# Patient Record
Sex: FEMALE | Race: WHITE | NOT HISPANIC OR LATINO | Employment: OTHER | URBAN - METROPOLITAN AREA
[De-identification: names, ages, dates, MRNs, and addresses within clinical notes are randomized per-mention and may not be internally consistent; named-entity substitution may affect disease eponyms.]

---

## 2017-01-03 RX ORDER — HYDROCHLOROTHIAZIDE 25 MG/1
TABLET ORAL
Qty: 90 TABLET | Refills: 1 | Status: SHIPPED | OUTPATIENT
Start: 2017-01-03 | End: 2017-06-12 | Stop reason: SDUPTHER

## 2017-02-23 ENCOUNTER — PATIENT MESSAGE (OUTPATIENT)
Dept: INTERNAL MEDICINE | Facility: CLINIC | Age: 72
End: 2017-02-23

## 2017-02-23 DIAGNOSIS — Z86.69 HISTORY OF MIGRAINE HEADACHES: ICD-10-CM

## 2017-02-23 RX ORDER — PROPRANOLOL HYDROCHLORIDE 120 MG/1
CAPSULE, EXTENDED RELEASE ORAL
Qty: 90 CAPSULE | Refills: 1 | Status: SHIPPED | OUTPATIENT
Start: 2017-02-23 | End: 2017-06-12 | Stop reason: SDUPTHER

## 2017-02-24 ENCOUNTER — PATIENT MESSAGE (OUTPATIENT)
Dept: INTERNAL MEDICINE | Facility: CLINIC | Age: 72
End: 2017-02-24

## 2017-03-27 ENCOUNTER — OFFICE VISIT (OUTPATIENT)
Dept: OTOLARYNGOLOGY | Facility: CLINIC | Age: 72
End: 2017-03-27
Payer: MEDICARE

## 2017-03-27 VITALS
BODY MASS INDEX: 32.56 KG/M2 | WEIGHT: 207.88 LBS | SYSTOLIC BLOOD PRESSURE: 143 MMHG | HEART RATE: 71 BPM | DIASTOLIC BLOOD PRESSURE: 84 MMHG

## 2017-03-27 DIAGNOSIS — H61.23 BILATERAL IMPACTED CERUMEN: Primary | ICD-10-CM

## 2017-03-27 PROCEDURE — 99499 UNLISTED E&M SERVICE: CPT | Mod: 25,S$PBB,, | Performed by: PHYSICIAN ASSISTANT

## 2017-03-27 PROCEDURE — 69210 REMOVE IMPACTED EAR WAX UNI: CPT | Mod: S$PBB,,, | Performed by: PHYSICIAN ASSISTANT

## 2017-03-27 PROCEDURE — 99213 OFFICE O/P EST LOW 20 MIN: CPT | Mod: PBBFAC | Performed by: PHYSICIAN ASSISTANT

## 2017-03-27 PROCEDURE — 69210 REMOVE IMPACTED EAR WAX UNI: CPT | Mod: PBBFAC | Performed by: PHYSICIAN ASSISTANT

## 2017-03-27 PROCEDURE — 99999 PR PBB SHADOW E&M-EST. PATIENT-LVL III: CPT | Mod: PBBFAC,,, | Performed by: PHYSICIAN ASSISTANT

## 2017-03-27 NOTE — PROGRESS NOTES
Subjective:   Patient: Teresa Orozco [id number], :1945   Visit date:3/27/2017 3:36 PM    Chief Complaint:  Ear Fullness    HPI:     Teresa Orozco is a 71 y.o. female whom I am asked to see for evaluation of diminished hearing in both ears for the past 2 months. There is a prior history of cerumen impaction.  She was last here in 2016 with Dr. Herbert for cerumen impactions.  Denies ear pain or drainage.    Her meds, allergies, medical, surgical, social & family histories were reviewed & updated:  -     She has a current medication list which includes the following prescription(s): cholecalciferol (vitamin d3), esomeprazole, flaxseed oil, hydrochlorothiazide, multivitamin, propranolol, propylene glycol/peg 400/pf, and samson's wort.  -     She  has a past medical history of Anxiety; Breast cancer (); Fluid retention; Headache; Hyperlipidemia; IBS (irritable bowel syndrome); Lymphocytic colitis (); and Vertigo.   -     She  does not have any pertinent problems on file.   -     She  has a past surgical history that includes Oophorectomy (Left); Hysterectomy; Tubal ligation; Colon surgery; Colonoscopy w/ biopsies and polypectomy (); and Cholecystectomy ().  -     She  reports that she has never smoked. She does not have any smokeless tobacco history on file. She reports that she does not drink alcohol or use illicit drugs.  -     Her family history includes Cancer in her mother.  -     She is allergic to demerol [meperidine].      Review of Systems:  -     Allergic/Immunologic: is allergic to demerol [meperidine]..  -     Constitutional: Denies fever.          Objective:     Physical Exam:  Vitals:  BP (!) 143/84  Pulse 71  Wt 94.3 kg (207 lb 14.3 oz)  BMI 32.56 kg/m2  Communication:  Able to communicate, no hoarseness.  Head & Face:  Normocephalic, atraumatic, no sinus tenderness.  Eyes:  Extraocular motions intact.  Ears:  Otoscopy of external auditory canals reveals impaction of bilateral ear  canals.  With the patient in the supine position, we used the operating microscope to examine both ears with the appropriate sized ear speculum.  A variety of sterile, micro-instruments were utilized to remove the cerumen atraumatically from the impacted ear(s).   After removal, the ears were reexamined-  Right Ear:  No mass/lesion of auricle. The external auditory canals is without erythema or discharge. Otoscopy of the tympanic membrane revealed no perforation and no middle ear fluid.  Clinical speech reception thresholds grossly normal.  Left Ear:  No mass/lesion of auricle. The external auditory canals has mild erythema but no discharge.  Otoscopy of the tympanic membrane revealed no perforation and no middle ear effusion. Clinical speech reception thresholds grossly normal  Nose:  No masses/lesions of external nose, nasal mucosa, septum, and turbinates were within normal limits.  Mouth:  No mass/lesion of lips, teeth, gums, hard/soft palate, tongue, tonsils, or oropharynx.  Neck & Lymphatics:  No cervical lymphadenopathy, no neck mass/crepitus/ asymmetry, trachea is midline, no thyroid enlargement/tenderness/mass.  Neuro/Psych: Alert with normal mood and affect.   Respiration/Chest:  Symmetric expansion during respiration, normal respiratory effort.  Skin:  Warm and intact.    Assessment & Plan:       -     Cerumen Impaction - Teresa has cerumen in both ear(s).  We discussed preventative measures and treatment options.  Q-tips must be avoided, instead the ears can be cleaned with OTC ear rinses (or a mixture of alcohol & vinegar in equal parts).   For hard wax, Teresa may place mineral oil/baby oil in the ear with a cotton ball at night and remove in the shower.  This will assist in softening the wax and allow it to drain out on its own. If the cerumen impacts the ear canal and causes hearing loss or infection she needs to follow-up in the clinic for treatment and cleaning.

## 2017-03-27 NOTE — MR AVS SNAPSHOT
[facility]  [id number] [place] [address]  [place] [address]  Phone: [phone]  Fax: [phone]                  Teresa Orozco   3/27/2017 2:40 PM   Office Visit    Description:  Female : 1945   Provider:  Genet Moody PA-C   Department:  [facility]           Reason for Visit     Ear Fullness                To Do List           Goals (5 Years of Data)     None      [facility] On Call     [facility] On Call Nurse Care Line -  Assistance  Registered nurses in the [facility] On Call Center provide clinical advisement, health education, appointment booking, and other advisory services.  Call for this free service at [phone].             Medications                Verify that the below list of medications is an accurate representation of the medications you are currently taking.  If none reported, the list may be blank. If incorrect, please contact your healthcare provider. Carry this list with you in case of emergency.           Current Medications     cholecalciferol, vitamin D3, (VITAMIN D3) 5,000 unit Tab Take 5,000 Units by mouth 3 (three) times a week.    esomeprazole (NEXIUM) 40 MG capsule Take 40 mg by mouth once daily.    FLAXSEED OIL ORAL Take 1,200 mg by mouth once daily.    hydrochlorothiazide (HYDRODIURIL) 25 MG tablet TAKE 1 TABLET (25 MG TOTAL) BY MOUTH ONCE DAILY. NEEDS TO ADD ORANGE JUICE OR BANANA TO DIET DAILY    multivitamin capsule Take 1 capsule by mouth once daily.    propranolol (INDERAL LA) 120 MG 24 hr capsule TAKE 1 CAPSULE DAILY    PROPYLENE GLYCOL//PF (SYSTANE ULTRA, PF, OPHT) Apply to eye.    Guerline's wort 300 mg Cap Take 300 mg by mouth 2 (two) times daily.           Clinical Reference Information           Your Vitals Were     BP Pulse Weight BMI       143/84 71 94.3 kg (207 lb 14.3 oz) 32.56 kg/m2       Blood Pressure          Most Recent Value    BP  (!)  143/84      Allergies as of 3/27/2017     Demerol  [Meperidine]      Immunizations Administered on Date of Encounter - 3/27/2017     None      Language Assistance Services     ATTENTION: Language assistance services are available, free of charge. Please call [phone].      ATENCIÓN: Si salma mares, tiene a callwaay disposición servicios gratuitos de asistencia lingüística. Llame al [phone].     [facility] Ý: N?u b?n nói Ti?ng Vi?t, có các d?ch v? h? tr? ngôn ng? mi?n phí dành cho b?n. G?i s? [phone].         [facility] complies with applicable Federal civil rights laws and does not discriminate on the basis of race, color, national origin, age, disability, or sex.

## 2017-03-31 DIAGNOSIS — Z86.69 HISTORY OF MIGRAINE HEADACHES: ICD-10-CM

## 2017-03-31 RX ORDER — PROPRANOLOL HYDROCHLORIDE 120 MG/1
CAPSULE, EXTENDED RELEASE ORAL
Qty: 90 CAPSULE | Refills: 0 | OUTPATIENT
Start: 2017-03-31

## 2017-04-26 ENCOUNTER — OFFICE VISIT (OUTPATIENT)
Dept: INTERNAL MEDICINE | Facility: CLINIC | Age: 72
End: 2017-04-26
Payer: MEDICARE

## 2017-04-26 ENCOUNTER — APPOINTMENT (OUTPATIENT)
Dept: RADIOLOGY | Facility: HOSPITAL | Age: 72
End: 2017-04-26
Attending: FAMILY MEDICINE
Payer: MEDICARE

## 2017-04-26 VITALS
WEIGHT: 207.69 LBS | DIASTOLIC BLOOD PRESSURE: 85 MMHG | SYSTOLIC BLOOD PRESSURE: 134 MMHG | HEART RATE: 67 BPM | OXYGEN SATURATION: 96 % | BODY MASS INDEX: 32.53 KG/M2 | TEMPERATURE: 98 F

## 2017-04-26 DIAGNOSIS — G89.29 CHRONIC RIGHT SHOULDER PAIN: ICD-10-CM

## 2017-04-26 DIAGNOSIS — M25.511 CHRONIC RIGHT SHOULDER PAIN: Primary | ICD-10-CM

## 2017-04-26 DIAGNOSIS — G89.29 CHRONIC RIGHT SHOULDER PAIN: Primary | ICD-10-CM

## 2017-04-26 DIAGNOSIS — M25.511 CHRONIC RIGHT SHOULDER PAIN: ICD-10-CM

## 2017-04-26 DIAGNOSIS — M19.011 PRIMARY OSTEOARTHRITIS OF RIGHT SHOULDER: ICD-10-CM

## 2017-04-26 PROCEDURE — 73030 X-RAY EXAM OF SHOULDER: CPT | Mod: 26,RT,, | Performed by: RADIOLOGY

## 2017-04-26 PROCEDURE — 73030 X-RAY EXAM OF SHOULDER: CPT | Mod: TC,PO,RT

## 2017-04-26 PROCEDURE — 99213 OFFICE O/P EST LOW 20 MIN: CPT | Mod: S$PBB,,, | Performed by: FAMILY MEDICINE

## 2017-04-26 PROCEDURE — 99999 PR PBB SHADOW E&M-EST. PATIENT-LVL III: CPT | Mod: PBBFAC,,, | Performed by: FAMILY MEDICINE

## 2017-04-26 RX ORDER — MELOXICAM 15 MG/1
15 TABLET ORAL DAILY
Qty: 30 TABLET | Refills: 0 | Status: SHIPPED | OUTPATIENT
Start: 2017-04-26 | End: 2017-04-26 | Stop reason: SDUPTHER

## 2017-04-26 RX ORDER — MELOXICAM 15 MG/1
15 TABLET ORAL DAILY
Qty: 30 TABLET | Refills: 0 | Status: SHIPPED | OUTPATIENT
Start: 2017-04-26 | End: 2017-06-12

## 2017-04-26 NOTE — PROGRESS NOTES
Subjective:       Patient ID: Teresa Orozco is a 71 y.o. female.    Chief Complaint: shoulder and arm pain (right side)    HPI Comments: 4- 6 month duration of right shoulder pain.  Pain is worse when arm is hanging  down and better when it is supported.  She mentioned she was carrying  heavy about the time that pain developed.  She was carrying a heavy load with her arm fully extended downward.  She denies any particular trauma beyond this.  She occasionally has neck pain which is stable.  She denies any weakness or numbness of the right arm.    Review of Systems   Constitutional: Positive for chills and fever.   Respiratory: Negative for cough and shortness of breath.    Cardiovascular: Negative for chest pain and palpitations.   Musculoskeletal: Positive for arthralgias.   Neurological: Negative for weakness and numbness.       Objective:      Physical Exam   Constitutional: She appears well-developed and well-nourished. No distress.   Musculoskeletal:   She has no tenderness of the neck.  She has no trapezius and scapular muscle tenderness.  She has localized tenderness with crepitance of the right  Ac  joint.  She has full range of motion of the right shoulder.       Lab Visit on 02/03/2016   Component Date Value Ref Range Status    Cholesterol 02/03/2016 268* 120 - 199 mg/dL Final    Triglycerides 02/03/2016 274* 30 - 150 mg/dL Final    HDL 02/03/2016 41  40 - 75 mg/dL Final    LDL Cholesterol 02/03/2016 172.2* 63.0 - 159.0 mg/dL Final    HDL/Chol Ratio 02/03/2016 15.3* 20.0 - 50.0 % Final    Total Cholesterol/HDL Ratio 02/03/2016 6.5* 2.0 - 5.0 Final    Non-HDL Cholesterol 02/03/2016 227  mg/dL Final    Sodium 02/03/2016 141  136 - 145 mmol/L Final    Potassium 02/03/2016 4.1  3.5 - 5.1 mmol/L Final    Chloride 02/03/2016 103  95 - 110 mmol/L Final    CO2 02/03/2016 28  23 - 29 mmol/L Final    Glucose 02/03/2016 93  70 - 110 mg/dL Final    BUN, Bld 02/03/2016 13  8 - 23 mg/dL Final    Creatinine  02/03/2016 0.7  0.5 - 1.4 mg/dL Final    Calcium 02/03/2016 9.7  8.7 - 10.5 mg/dL Final    Total Protein 02/03/2016 6.7  6.0 - 8.4 g/dL Final    Albumin 02/03/2016 3.7  3.5 - 5.2 g/dL Final    Total Bilirubin 02/03/2016 0.5  0.1 - 1.0 mg/dL Final    Alkaline Phosphatase 02/03/2016 99  55 - 135 U/L Final    AST 02/03/2016 16  10 - 40 U/L Final    ALT 02/03/2016 16  10 - 44 U/L Final    Anion Gap 02/03/2016 10  8 - 16 mmol/L Final    eGFR if African American 02/03/2016 >60.0  >60 mL/min/1.73 m^2 Final    eGFR if non African American 02/03/2016 >60.0  >60 mL/min/1.73 m^2 Final    WBC 02/03/2016 7.87  3.90 - 12.70 K/uL Final    RBC 02/03/2016 4.59  4.00 - 5.40 M/uL Final    Hemoglobin 02/03/2016 13.4  12.0 - 16.0 g/dL Final    Hematocrit 02/03/2016 41.5  37.0 - 48.5 % Final    MCV 02/03/2016 90  82 - 98 fL Final    MCH 02/03/2016 29.2  27.0 - 31.0 pg Final    MCHC 02/03/2016 32.3  32.0 - 36.0 % Final    RDW 02/03/2016 13.7  11.5 - 14.5 % Final    Platelets 02/03/2016 343  150 - 350 K/uL Final    MPV 02/03/2016 11.3  9.2 - 12.9 fL Final    Gran # 02/03/2016 4.5  1.8 - 7.7 K/uL Final    Lymph # 02/03/2016 2.5  1.0 - 4.8 K/uL Final    Mono # 02/03/2016 0.6  0.3 - 1.0 K/uL Final    Eos # 02/03/2016 0.2  0.0 - 0.5 K/uL Final    Baso # 02/03/2016 0.03  0.00 - 0.20 K/uL Final    Gran% 02/03/2016 57.3  38.0 - 73.0 % Final    Lymph% 02/03/2016 31.1  18.0 - 48.0 % Final    Mono% 02/03/2016 8.1  4.0 - 15.0 % Final    Eosinophil% 02/03/2016 2.7  0.0 - 8.0 % Final    Basophil% 02/03/2016 0.4  0.0 - 1.9 % Final    Differential Method 02/03/2016 Automated   Final     Assessment:       1. Chronic right shoulder pain        Plan:     Xray shoulder reviewed  Heat and rom bid with arm in sling  mobic 15 mg qd and ov 2wk    Chronic right shoulder pain  -     X-ray Shoulder 2 or More Views Right; Future; Expected date: 4/26/17

## 2017-04-27 ENCOUNTER — TELEPHONE (OUTPATIENT)
Dept: INTERNAL MEDICINE | Facility: CLINIC | Age: 72
End: 2017-04-27

## 2017-04-27 NOTE — TELEPHONE ENCOUNTER
Informed pt that meds sent to mail pharmacy has been canceled, if she receives them, she needs to call them to have them shipped back since she is picking them up from local pharmacy instead. Verbalized understanding.

## 2017-05-11 ENCOUNTER — OFFICE VISIT (OUTPATIENT)
Dept: INTERNAL MEDICINE | Facility: CLINIC | Age: 72
End: 2017-05-11
Payer: MEDICARE

## 2017-05-11 VITALS
WEIGHT: 208.25 LBS | SYSTOLIC BLOOD PRESSURE: 131 MMHG | HEIGHT: 67 IN | HEART RATE: 73 BPM | DIASTOLIC BLOOD PRESSURE: 75 MMHG | TEMPERATURE: 98 F | OXYGEN SATURATION: 95 % | BODY MASS INDEX: 32.69 KG/M2

## 2017-05-11 DIAGNOSIS — G89.29 CHRONIC RIGHT SHOULDER PAIN: Primary | ICD-10-CM

## 2017-05-11 DIAGNOSIS — M19.011 PRIMARY OSTEOARTHRITIS OF RIGHT SHOULDER: ICD-10-CM

## 2017-05-11 DIAGNOSIS — M25.511 CHRONIC RIGHT SHOULDER PAIN: Primary | ICD-10-CM

## 2017-05-11 PROCEDURE — 99213 OFFICE O/P EST LOW 20 MIN: CPT | Mod: PBBFAC,PO | Performed by: FAMILY MEDICINE

## 2017-05-11 PROCEDURE — 99999 PR PBB SHADOW E&M-EST. PATIENT-LVL III: CPT | Mod: PBBFAC,,, | Performed by: FAMILY MEDICINE

## 2017-05-11 PROCEDURE — 99213 OFFICE O/P EST LOW 20 MIN: CPT | Mod: S$PBB,,, | Performed by: FAMILY MEDICINE

## 2017-05-11 RX ORDER — MELOXICAM 7.5 MG/1
7.5 TABLET ORAL DAILY
Qty: 30 TABLET | Refills: 0 | Status: SHIPPED | OUTPATIENT
Start: 2017-05-11 | End: 2017-12-12

## 2017-05-11 NOTE — PROGRESS NOTES
Subjective:       Patient ID: Teresa Orozco is a 71 y.o. female.    Chief Complaint: Follow-up    HPI Comments: Right shoulder pain is improved.  She is on meloxicam 15 mg a day with heat and  range of motion daily.  She also got some relief using an arm sling.  She reports that in the past when she took corticosteroids she had been increased indigestion    Review of Systems   Musculoskeletal: Positive for arthralgias.        Right shoulder pain       Objective:      Physical Exam   Musculoskeletal:   She has localized tenderness in the right AC  joint.  There is some mild bony enlargement.  No crepitance felt.  She has full range of motion of her shoulder       Lab Visit on 02/03/2016   Component Date Value Ref Range Status    Cholesterol 02/03/2016 268* 120 - 199 mg/dL Final    Triglycerides 02/03/2016 274* 30 - 150 mg/dL Final    HDL 02/03/2016 41  40 - 75 mg/dL Final    LDL Cholesterol 02/03/2016 172.2* 63.0 - 159.0 mg/dL Final    HDL/Chol Ratio 02/03/2016 15.3* 20.0 - 50.0 % Final    Total Cholesterol/HDL Ratio 02/03/2016 6.5* 2.0 - 5.0 Final    Non-HDL Cholesterol 02/03/2016 227  mg/dL Final    Sodium 02/03/2016 141  136 - 145 mmol/L Final    Potassium 02/03/2016 4.1  3.5 - 5.1 mmol/L Final    Chloride 02/03/2016 103  95 - 110 mmol/L Final    CO2 02/03/2016 28  23 - 29 mmol/L Final    Glucose 02/03/2016 93  70 - 110 mg/dL Final    BUN, Bld 02/03/2016 13  8 - 23 mg/dL Final    Creatinine 02/03/2016 0.7  0.5 - 1.4 mg/dL Final    Calcium 02/03/2016 9.7  8.7 - 10.5 mg/dL Final    Total Protein 02/03/2016 6.7  6.0 - 8.4 g/dL Final    Albumin 02/03/2016 3.7  3.5 - 5.2 g/dL Final    Total Bilirubin 02/03/2016 0.5  0.1 - 1.0 mg/dL Final    Alkaline Phosphatase 02/03/2016 99  55 - 135 U/L Final    AST 02/03/2016 16  10 - 40 U/L Final    ALT 02/03/2016 16  10 - 44 U/L Final    Anion Gap 02/03/2016 10  8 - 16 mmol/L Final    eGFR if African American 02/03/2016 >60.0  >60 mL/min/1.73 m^2 Final     eGFR if non African American 02/03/2016 >60.0  >60 mL/min/1.73 m^2 Final    WBC 02/03/2016 7.87  3.90 - 12.70 K/uL Final    RBC 02/03/2016 4.59  4.00 - 5.40 M/uL Final    Hemoglobin 02/03/2016 13.4  12.0 - 16.0 g/dL Final    Hematocrit 02/03/2016 41.5  37.0 - 48.5 % Final    MCV 02/03/2016 90  82 - 98 fL Final    MCH 02/03/2016 29.2  27.0 - 31.0 pg Final    MCHC 02/03/2016 32.3  32.0 - 36.0 % Final    RDW 02/03/2016 13.7  11.5 - 14.5 % Final    Platelets 02/03/2016 343  150 - 350 K/uL Final    MPV 02/03/2016 11.3  9.2 - 12.9 fL Final    Gran # 02/03/2016 4.5  1.8 - 7.7 K/uL Final    Lymph # 02/03/2016 2.5  1.0 - 4.8 K/uL Final    Mono # 02/03/2016 0.6  0.3 - 1.0 K/uL Final    Eos # 02/03/2016 0.2  0.0 - 0.5 K/uL Final    Baso # 02/03/2016 0.03  0.00 - 0.20 K/uL Final    Gran% 02/03/2016 57.3  38.0 - 73.0 % Final    Lymph% 02/03/2016 31.1  18.0 - 48.0 % Final    Mono% 02/03/2016 8.1  4.0 - 15.0 % Final    Eosinophil% 02/03/2016 2.7  0.0 - 8.0 % Final    Basophil% 02/03/2016 0.4  0.0 - 1.9 % Final    Differential Method 02/03/2016 Automated   Final     Assessment:       1. Chronic right shoulder pain        Plan:   Complete meloxicam 15 mg a day.  We'll then begin meloxicam 7.5 mg a day for about 2 weeks.  Continue heat and range of motion.  Return in one month for recheck as well as annual exam with lab      Chronic right shoulder pain  -     meloxicam (MOBIC) 7.5 MG tablet; Take 1 tablet (7.5 mg total) by mouth once daily.  Dispense: 30 tablet; Refill: 0

## 2017-05-29 ENCOUNTER — PATIENT OUTREACH (OUTPATIENT)
Dept: ADMINISTRATIVE | Facility: HOSPITAL | Age: 72
End: 2017-05-29

## 2017-05-29 DIAGNOSIS — E78.5 HYPERLIPIDEMIA, UNSPECIFIED HYPERLIPIDEMIA TYPE: Primary | ICD-10-CM

## 2017-06-12 ENCOUNTER — OFFICE VISIT (OUTPATIENT)
Dept: INTERNAL MEDICINE | Facility: CLINIC | Age: 72
End: 2017-06-12
Payer: MEDICARE

## 2017-06-12 VITALS
DIASTOLIC BLOOD PRESSURE: 80 MMHG | BODY MASS INDEX: 32.46 KG/M2 | WEIGHT: 206.81 LBS | HEIGHT: 67 IN | HEART RATE: 70 BPM | TEMPERATURE: 98 F | OXYGEN SATURATION: 94 % | SYSTOLIC BLOOD PRESSURE: 115 MMHG

## 2017-06-12 DIAGNOSIS — E78.5 HYPERLIPIDEMIA, UNSPECIFIED HYPERLIPIDEMIA TYPE: ICD-10-CM

## 2017-06-12 DIAGNOSIS — R60.9 EDEMA, UNSPECIFIED TYPE: ICD-10-CM

## 2017-06-12 DIAGNOSIS — Z86.69 HISTORY OF MIGRAINE HEADACHES: ICD-10-CM

## 2017-06-12 DIAGNOSIS — M25.561 CHRONIC PAIN OF BOTH KNEES: ICD-10-CM

## 2017-06-12 DIAGNOSIS — C50.912 MALIGNANT NEOPLASM OF LEFT FEMALE BREAST, UNSPECIFIED SITE OF BREAST: ICD-10-CM

## 2017-06-12 DIAGNOSIS — M25.562 CHRONIC PAIN OF BOTH KNEES: ICD-10-CM

## 2017-06-12 DIAGNOSIS — G89.29 CHRONIC RIGHT SHOULDER PAIN: ICD-10-CM

## 2017-06-12 DIAGNOSIS — Z00.00 ANNUAL PHYSICAL EXAM: Primary | ICD-10-CM

## 2017-06-12 DIAGNOSIS — G89.29 CHRONIC PAIN OF BOTH KNEES: ICD-10-CM

## 2017-06-12 DIAGNOSIS — M25.511 CHRONIC RIGHT SHOULDER PAIN: ICD-10-CM

## 2017-06-12 DIAGNOSIS — F41.9 ANXIETY: ICD-10-CM

## 2017-06-12 DIAGNOSIS — E87.6 HYPOKALEMIA: ICD-10-CM

## 2017-06-12 LAB
BASOPHILS # BLD AUTO: 0.05 K/UL
BASOPHILS NFR BLD: 0.5 %
BILIRUB UR QL STRIP: ABNORMAL
CLARITY UR REFRACT.AUTO: ABNORMAL
COLOR UR AUTO: YELLOW
DIFFERENTIAL METHOD: ABNORMAL
EOSINOPHIL # BLD AUTO: 0.3 K/UL
EOSINOPHIL NFR BLD: 3.1 %
ERYTHROCYTE [DISTWIDTH] IN BLOOD BY AUTOMATED COUNT: 13.8 %
GLUCOSE UR QL STRIP: NEGATIVE
HCT VFR BLD AUTO: 44.6 %
HGB BLD-MCNC: 14.8 G/DL
HGB UR QL STRIP: NEGATIVE
KETONES UR QL STRIP: NEGATIVE
LEUKOCYTE ESTERASE UR QL STRIP: NEGATIVE
LYMPHOCYTES # BLD AUTO: 1.8 K/UL
LYMPHOCYTES NFR BLD: 18.8 %
MCH RBC QN AUTO: 29.8 PG
MCHC RBC AUTO-ENTMCNC: 33.2 %
MCV RBC AUTO: 90 FL
MONOCYTES # BLD AUTO: 0.8 K/UL
MONOCYTES NFR BLD: 8.7 %
NEUTROPHILS # BLD AUTO: 6.4 K/UL
NEUTROPHILS NFR BLD: 68.6 %
NITRITE UR QL STRIP: NEGATIVE
PH UR STRIP: 7 [PH] (ref 5–8)
PLATELET # BLD AUTO: 384 K/UL
PMV BLD AUTO: 11.3 FL
PROT UR QL STRIP: NEGATIVE
RBC # BLD AUTO: 4.97 M/UL
SP GR UR STRIP: 1.02 (ref 1–1.03)
URN SPEC COLLECT METH UR: ABNORMAL
UROBILINOGEN UR STRIP-ACNC: NEGATIVE EU/DL
WBC # BLD AUTO: 9.35 K/UL

## 2017-06-12 PROCEDURE — 85025 COMPLETE CBC W/AUTO DIFF WBC: CPT

## 2017-06-12 PROCEDURE — 99214 OFFICE O/P EST MOD 30 MIN: CPT | Mod: S$PBB,,, | Performed by: FAMILY MEDICINE

## 2017-06-12 PROCEDURE — 90670 PCV13 VACCINE IM: CPT | Mod: PBBFAC,PO

## 2017-06-12 PROCEDURE — 99214 OFFICE O/P EST MOD 30 MIN: CPT | Mod: PBBFAC,PO | Performed by: FAMILY MEDICINE

## 2017-06-12 PROCEDURE — 80053 COMPREHEN METABOLIC PANEL: CPT

## 2017-06-12 PROCEDURE — 99999 PR PBB SHADOW E&M-EST. PATIENT-LVL IV: CPT | Mod: PBBFAC,,, | Performed by: FAMILY MEDICINE

## 2017-06-12 PROCEDURE — 1159F MED LIST DOCD IN RCRD: CPT | Mod: ,,, | Performed by: FAMILY MEDICINE

## 2017-06-12 PROCEDURE — 80061 LIPID PANEL: CPT

## 2017-06-12 PROCEDURE — 1125F AMNT PAIN NOTED PAIN PRSNT: CPT | Mod: ,,, | Performed by: FAMILY MEDICINE

## 2017-06-12 PROCEDURE — 81003 URINALYSIS AUTO W/O SCOPE: CPT

## 2017-06-12 PROCEDURE — 84443 ASSAY THYROID STIM HORMONE: CPT

## 2017-06-12 RX ORDER — PROPRANOLOL HYDROCHLORIDE 120 MG/1
120 CAPSULE, EXTENDED RELEASE ORAL DAILY
Qty: 90 CAPSULE | Refills: 3 | Status: SHIPPED | OUTPATIENT
Start: 2017-06-12 | End: 2018-07-11 | Stop reason: SDUPTHER

## 2017-06-12 RX ORDER — LORAZEPAM 0.5 MG/1
0.5 TABLET ORAL EVERY 6 HOURS PRN
COMMUNITY
End: 2021-02-22 | Stop reason: SDUPTHER

## 2017-06-12 RX ORDER — TRIAMCINOLONE ACETONIDE 55 UG/1
2 SPRAY, METERED NASAL DAILY PRN
COMMUNITY
End: 2020-10-14

## 2017-06-12 RX ORDER — HYDROCHLOROTHIAZIDE 25 MG/1
25 TABLET ORAL DAILY
Qty: 90 TABLET | Refills: 3 | Status: SHIPPED | OUTPATIENT
Start: 2017-06-12 | End: 2018-06-14 | Stop reason: SDUPTHER

## 2017-06-12 NOTE — PROGRESS NOTES
Subjective:       Patient ID: Teresa Orozco is a 71 y.o. female.    Chief Complaint: Annual Exam    Annual exam.  Follow-up edema and migraine headaches.  She is status post left mastectomy for breast cancer.  Mammograms up-to-date and negativ  She is also followed by GI for I  Symptoms stable.  She reports edema is controlled w  Migraine headaches are improved with   Inderal.  Immunizations needed are Prevnar 13.      Review of Systems   Constitutional: Negative for activity change, appetite change, fatigue and unexpected weight change.   HENT: Negative for congestion, dental problem, ear pain, hearing loss, sneezing, sore throat, tinnitus and trouble swallowing.    Eyes: Negative for pain, redness, itching and visual disturbance.        Followed by ophthalmology for retinal specialist for pending retinal tear.   Respiratory: Negative for cough, chest tightness, shortness of breath and wheezing.    Cardiovascular: Positive for leg swelling. Negative for chest pain and palpitations.        Edema controlled with hydrochlorothiazid   Gastrointestinal: Negative for abdominal distention, abdominal pain, blood in stool, constipation, diarrhea, nausea and vomiting.        IBS is stable   Endocrine: Negative for polydipsia and polyuria.   Genitourinary: Negative for difficulty urinating, dysuria, frequency, hematuria and urgency.        Status post hysterectomy   Musculoskeletal: Positive for arthralgias. Negative for back pain, joint swelling, neck pain and neck stiffness.        Previous right shoulder pain is resolved.  She has intermittently pain and restricted in walking due to this.   Skin: Negative for rash and wound.        She has a lesion left arm that has not healed in one year.  She has a dermatologist and   She will have dermatology review this.   Neurological: Negative for dizziness, tremors, syncope, weakness, light-headedness, numbness and headaches.        Migraine headaches improved with  Inderal    Hematological: Negative for adenopathy. Does not bruise/bleed easily.   Psychiatric/Behavioral: Negative for agitation, dysphoric mood and sleep disturbance. The patient is nervous/anxious.         Anxiety improved with inderal       Objective:      Physical Exam   Constitutional: She is oriented to person, place, and time. She appears well-developed and well-nourished.   HENT:   Right Ear: External ear normal.   Left Ear: External ear normal.   Nose: Nose normal.   Mouth/Throat: Oropharynx is clear and moist.   Eyes: Conjunctivae and EOM are normal. Pupils are equal, round, and reactive to light.   Neck: Normal range of motion. Neck supple. No thyromegaly present.   Cardiovascular: Normal rate, regular rhythm and normal heart sounds.    No murmur heard.  Pulmonary/Chest: Effort normal and breath sounds normal. She has no wheezes. She has no rales.   Abdominal: Soft. Bowel sounds are normal. She exhibits no distension and no mass. There is no tenderness.   Musculoskeletal: She exhibits no edema or tenderness.   Lymphadenopathy:     She has no cervical adenopathy.   Neurological: She is alert and oriented to person, place, and time. She has normal reflexes. She displays normal reflexes. No cranial nerve deficit. She exhibits normal muscle tone. Coordination normal.   Skin: Skin is warm and dry. No rash noted.   2 mm round slightly elevated flesh-colored lesion of the left arm .  There is 1. Area of nonhealing.   Psychiatric: She has a normal mood and affect. Her behavior is normal. Judgment and thought content normal.       Lab Visit on 02/03/2016   Component Date Value Ref Range Status    Cholesterol 02/03/2016 268* 120 - 199 mg/dL Final    Triglycerides 02/03/2016 274* 30 - 150 mg/dL Final    HDL 02/03/2016 41  40 - 75 mg/dL Final    LDL Cholesterol 02/03/2016 172.2* 63.0 - 159.0 mg/dL Final    HDL/Chol Ratio 02/03/2016 15.3* 20.0 - 50.0 % Final    Total Cholesterol/HDL Ratio 02/03/2016 6.5* 2.0 - 5.0 Final     Non-HDL Cholesterol 02/03/2016 227  mg/dL Final    Sodium 02/03/2016 141  136 - 145 mmol/L Final    Potassium 02/03/2016 4.1  3.5 - 5.1 mmol/L Final    Chloride 02/03/2016 103  95 - 110 mmol/L Final    CO2 02/03/2016 28  23 - 29 mmol/L Final    Glucose 02/03/2016 93  70 - 110 mg/dL Final    BUN, Bld 02/03/2016 13  8 - 23 mg/dL Final    Creatinine 02/03/2016 0.7  0.5 - 1.4 mg/dL Final    Calcium 02/03/2016 9.7  8.7 - 10.5 mg/dL Final    Total Protein 02/03/2016 6.7  6.0 - 8.4 g/dL Final    Albumin 02/03/2016 3.7  3.5 - 5.2 g/dL Final    Total Bilirubin 02/03/2016 0.5  0.1 - 1.0 mg/dL Final    Alkaline Phosphatase 02/03/2016 99  55 - 135 U/L Final    AST 02/03/2016 16  10 - 40 U/L Final    ALT 02/03/2016 16  10 - 44 U/L Final    Anion Gap 02/03/2016 10  8 - 16 mmol/L Final    eGFR if African American 02/03/2016 >60.0  >60 mL/min/1.73 m^2 Final    eGFR if non African American 02/03/2016 >60.0  >60 mL/min/1.73 m^2 Final    WBC 02/03/2016 7.87  3.90 - 12.70 K/uL Final    RBC 02/03/2016 4.59  4.00 - 5.40 M/uL Final    Hemoglobin 02/03/2016 13.4  12.0 - 16.0 g/dL Final    Hematocrit 02/03/2016 41.5  37.0 - 48.5 % Final    MCV 02/03/2016 90  82 - 98 fL Final    MCH 02/03/2016 29.2  27.0 - 31.0 pg Final    MCHC 02/03/2016 32.3  32.0 - 36.0 % Final    RDW 02/03/2016 13.7  11.5 - 14.5 % Final    Platelets 02/03/2016 343  150 - 350 K/uL Final    MPV 02/03/2016 11.3  9.2 - 12.9 fL Final    Gran # 02/03/2016 4.5  1.8 - 7.7 K/uL Final    Lymph # 02/03/2016 2.5  1.0 - 4.8 K/uL Final    Mono # 02/03/2016 0.6  0.3 - 1.0 K/uL Final    Eos # 02/03/2016 0.2  0.0 - 0.5 K/uL Final    Baso # 02/03/2016 0.03  0.00 - 0.20 K/uL Final    Gran% 02/03/2016 57.3  38.0 - 73.0 % Final    Lymph% 02/03/2016 31.1  18.0 - 48.0 % Final    Mono% 02/03/2016 8.1  4.0 - 15.0 % Final    Eosinophil% 02/03/2016 2.7  0.0 - 8.0 % Final    Basophil% 02/03/2016 0.4  0.0 - 1.9 % Final    Differential Method 02/03/2016  Automated   Final     Assessment:       1. History of migraine headaches        Plan:   Refill hydrochlorothiazide and propranolol.  Complete form for handicap license due to knee pain.   Lab was ordered.  Prevnar 13 given today.  Follow-up in 6 months.      History of migraine headaches  -     propranolol (INDERAL LA) 120 MG 24 hr capsule; Take 1 capsule (120 mg total) by mouth once daily.  Dispense: 90 capsule; Refill: 1    Other orders  -     CBC auto differential  -     Urinalysis  -     Comprehensive metabolic panel  -     Lipid panel  -     TSH  -     (In Office Administered) Pneumococcal Conjugate Vaccine (13 Valent) (IM)  -     hydrochlorothiazide (HYDRODIURIL) 25 MG tablet;   Dispense: 90 tablet; Refill: 1

## 2017-06-13 LAB
ALBUMIN SERPL BCP-MCNC: 4 G/DL
ALP SERPL-CCNC: 102 U/L
ALT SERPL W/O P-5'-P-CCNC: 26 U/L
ANION GAP SERPL CALC-SCNC: 14 MMOL/L
AST SERPL-CCNC: 24 U/L
BILIRUB SERPL-MCNC: 0.4 MG/DL
BUN SERPL-MCNC: 19 MG/DL
CALCIUM SERPL-MCNC: 9.9 MG/DL
CHLORIDE SERPL-SCNC: 101 MMOL/L
CHOLEST/HDLC SERPL: 6 {RATIO}
CO2 SERPL-SCNC: 27 MMOL/L
CREAT SERPL-MCNC: 0.8 MG/DL
EST. GFR  (AFRICAN AMERICAN): >60 ML/MIN/1.73 M^2
EST. GFR  (NON AFRICAN AMERICAN): >60 ML/MIN/1.73 M^2
GLUCOSE SERPL-MCNC: 112 MG/DL
HDL/CHOLESTEROL RATIO: 16.7 %
HDLC SERPL-MCNC: 281 MG/DL
HDLC SERPL-MCNC: 47 MG/DL
LDLC SERPL CALC-MCNC: 187.8 MG/DL
NONHDLC SERPL-MCNC: 234 MG/DL
POTASSIUM SERPL-SCNC: 3.8 MMOL/L
PROT SERPL-MCNC: 7.6 G/DL
SODIUM SERPL-SCNC: 142 MMOL/L
TRIGL SERPL-MCNC: 231 MG/DL
TSH SERPL DL<=0.005 MIU/L-ACNC: 2.9 UIU/ML

## 2017-09-11 PROBLEM — Z00.00 ANNUAL PHYSICAL EXAM: Status: RESOLVED | Noted: 2017-06-12 | Resolved: 2017-09-11

## 2017-11-20 ENCOUNTER — OFFICE VISIT (OUTPATIENT)
Dept: OTOLARYNGOLOGY | Facility: CLINIC | Age: 72
End: 2017-11-20
Payer: MEDICARE

## 2017-11-20 VITALS
HEART RATE: 60 BPM | SYSTOLIC BLOOD PRESSURE: 125 MMHG | TEMPERATURE: 97 F | RESPIRATION RATE: 18 BRPM | DIASTOLIC BLOOD PRESSURE: 77 MMHG | HEIGHT: 67 IN | BODY MASS INDEX: 32.46 KG/M2 | WEIGHT: 206.81 LBS

## 2017-11-20 DIAGNOSIS — H61.23 BILATERAL IMPACTED CERUMEN: Primary | ICD-10-CM

## 2017-11-20 PROCEDURE — 99213 OFFICE O/P EST LOW 20 MIN: CPT | Mod: PBBFAC | Performed by: PHYSICIAN ASSISTANT

## 2017-11-20 PROCEDURE — 99499 UNLISTED E&M SERVICE: CPT | Mod: S$PBB,,, | Performed by: PHYSICIAN ASSISTANT

## 2017-11-20 PROCEDURE — 69210 REMOVE IMPACTED EAR WAX UNI: CPT | Mod: S$PBB,,, | Performed by: PHYSICIAN ASSISTANT

## 2017-11-20 PROCEDURE — 99999 PR PBB SHADOW E&M-EST. PATIENT-LVL III: CPT | Mod: PBBFAC,,, | Performed by: PHYSICIAN ASSISTANT

## 2017-11-20 PROCEDURE — 69210 REMOVE IMPACTED EAR WAX UNI: CPT | Mod: PBBFAC | Performed by: PHYSICIAN ASSISTANT

## 2017-11-20 RX ORDER — DIPHENOXYLATE HYDROCHLORIDE AND ATROPINE SULFATE 2.5; .025 MG/1; MG/1
1 TABLET ORAL EVERY MORNING
Refills: 3 | COMMUNITY
Start: 2017-09-30 | End: 2018-11-20

## 2017-11-21 NOTE — PROGRESS NOTES
Subjective:   Cerumen impactions     Patient ID: Teresa Orozco is a 72 y.o. female.    Chief Complaint:  Excessive ear wax     Teresa Orozco is a 72 y.o. female here to see me today for evaluation of a possible wax impaction in bilateral ears.  She has complaints of hearing loss in the affected ears, but denies pain or drainage.  This has been an issue in the past.  The patient has not been using any sort of ear drop to soften the wax.    HPI  Review of Systems   HENT: Positive for hearing loss. Negative for ear discharge, ear pain and tinnitus.        Objective:     Physical Exam   HENT:   Right Ear: External ear and ear canal normal. Decreased hearing is noted.   Left Ear: External ear and ear canal normal. Decreased hearing is noted.   Bilateral complete cerumen impactions, removal described below       Procedure Note    CHIEF COMPLAINT:  Cerumen Impaction    Description:  The patient was seated in an exam chair.  An ear speculum was placed in the right EAC and was examined under the microscope.  Suction and/or loop curettes, alligator forceps were used to remove a large cerumen impaction.  The tympanic membrane was visualized and was normal in appearance.  The procedure was repeated on the left side in a similar fashion.  The TM was intact and normal on this side as well.  The patient tolerated the procedure well.           Assessment:     1. Bilateral impacted cerumen        Plan:     1.  Cerumen impaction:  Removed today without difficulty.  I would recommend the use of a wax softening drop, either over the counter Debrox or mineral oil, on a weekly basis.  I also instructed the patient to avoid Qtips.

## 2017-12-12 ENCOUNTER — OFFICE VISIT (OUTPATIENT)
Dept: INTERNAL MEDICINE | Facility: CLINIC | Age: 72
End: 2017-12-12
Payer: MEDICARE

## 2017-12-12 VITALS
OXYGEN SATURATION: 95 % | WEIGHT: 203.63 LBS | DIASTOLIC BLOOD PRESSURE: 65 MMHG | BODY MASS INDEX: 31.96 KG/M2 | TEMPERATURE: 98 F | HEART RATE: 59 BPM | HEIGHT: 67 IN | SYSTOLIC BLOOD PRESSURE: 136 MMHG

## 2017-12-12 DIAGNOSIS — G43.909 MIGRAINE WITHOUT STATUS MIGRAINOSUS, NOT INTRACTABLE, UNSPECIFIED MIGRAINE TYPE: Primary | ICD-10-CM

## 2017-12-12 PROCEDURE — 99213 OFFICE O/P EST LOW 20 MIN: CPT | Mod: S$PBB,,, | Performed by: FAMILY MEDICINE

## 2017-12-12 PROCEDURE — 99999 PR PBB SHADOW E&M-EST. PATIENT-LVL III: CPT | Mod: PBBFAC,,, | Performed by: FAMILY MEDICINE

## 2017-12-12 PROCEDURE — 99213 OFFICE O/P EST LOW 20 MIN: CPT | Mod: PBBFAC,PO | Performed by: FAMILY MEDICINE

## 2017-12-12 NOTE — PROGRESS NOTES
Subjective:       Patient ID: Teresa Orozco is a 72 y.o. female.    Chief Complaint: Follow-up    Followup  migraine headaches.  She reports her headaches are controlled with Inderal.      Review of Systems   Constitutional: Negative for appetite change, fatigue and unexpected weight change.   Respiratory: Negative for cough, shortness of breath and wheezing.    Cardiovascular: Negative for chest pain, palpitations and leg swelling.   Gastrointestinal: Negative for abdominal pain.   Genitourinary: Negative for difficulty urinating.   Neurological: Negative for headaches.       Objective:      Physical Exam   Constitutional: She is oriented to person, place, and time. She appears well-developed and well-nourished. No distress.   Neck: Neck supple. No thyromegaly present.   Cardiovascular: Normal rate, regular rhythm and normal heart sounds.    No murmur heard.  Pulmonary/Chest: Effort normal and breath sounds normal. No respiratory distress. She has no wheezes.   Abdominal: Soft. Bowel sounds are normal. She exhibits no mass. There is no tenderness.   Lymphadenopathy:     She has no cervical adenopathy.   Neurological: She is alert and oriented to person, place, and time.       Office Visit on 06/12/2017   Component Date Value Ref Range Status    WBC 06/12/2017 9.35  3.90 - 12.70 K/uL Final    RBC 06/12/2017 4.97  4.00 - 5.40 M/uL Final    Hemoglobin 06/12/2017 14.8  12.0 - 16.0 g/dL Final    Hematocrit 06/12/2017 44.6  37.0 - 48.5 % Final    MCV 06/12/2017 90  82 - 98 fL Final    MCH 06/12/2017 29.8  27.0 - 31.0 pg Final    MCHC 06/12/2017 33.2  32.0 - 36.0 % Final    RDW 06/12/2017 13.8  11.5 - 14.5 % Final    Platelets 06/12/2017 384* 150 - 350 K/uL Final    MPV 06/12/2017 11.3  9.2 - 12.9 fL Final    Gran # 06/12/2017 6.4  1.8 - 7.7 K/uL Final    Lymph # 06/12/2017 1.8  1.0 - 4.8 K/uL Final    Mono # 06/12/2017 0.8  0.3 - 1.0 K/uL Final    Eos # 06/12/2017 0.3  0.0 - 0.5 K/uL Final    Baso #  06/12/2017 0.05  0.00 - 0.20 K/uL Final    Gran% 06/12/2017 68.6  38.0 - 73.0 % Final    Lymph% 06/12/2017 18.8  18.0 - 48.0 % Final    Mono% 06/12/2017 8.7  4.0 - 15.0 % Final    Eosinophil% 06/12/2017 3.1  0.0 - 8.0 % Final    Basophil% 06/12/2017 0.5  0.0 - 1.9 % Final    Differential Method 06/12/2017 Automated   Final    Specimen UA 06/12/2017 Urine, Clean Catch   Final    Color, UA 06/12/2017 Yellow  Yellow, Straw, Johanne Final    Appearance, UA 06/12/2017 Hazy* Clear Final    pH, UA 06/12/2017 7.0  5.0 - 8.0 Final    Specific Gravity, UA 06/12/2017 1.020  1.005 - 1.030 Final    Protein, UA 06/12/2017 Negative  Negative Final    Glucose, UA 06/12/2017 Negative  Negative Final    Ketones, UA 06/12/2017 Negative  Negative Final    Bilirubin (UA) 06/12/2017 1+* Negative Final    Occult Blood UA 06/12/2017 Negative  Negative Final    Nitrite, UA 06/12/2017 Negative  Negative Final    Urobilinogen, UA 06/12/2017 Negative  <2.0 EU/dL Final    Leukocytes, UA 06/12/2017 Negative  Negative Final    Sodium 06/13/2017 142  136 - 145 mmol/L Final    Potassium 06/13/2017 3.8  3.5 - 5.1 mmol/L Final    Chloride 06/13/2017 101  95 - 110 mmol/L Final    CO2 06/13/2017 27  23 - 29 mmol/L Final    Glucose 06/13/2017 112* 70 - 110 mg/dL Final    BUN, Bld 06/13/2017 19  8 - 23 mg/dL Final    Creatinine 06/13/2017 0.8  0.5 - 1.4 mg/dL Final    Calcium 06/13/2017 9.9  8.7 - 10.5 mg/dL Final    Total Protein 06/13/2017 7.6  6.0 - 8.4 g/dL Final    Albumin 06/13/2017 4.0  3.5 - 5.2 g/dL Final    Total Bilirubin 06/13/2017 0.4  0.1 - 1.0 mg/dL Final    Alkaline Phosphatase 06/13/2017 102  55 - 135 U/L Final    AST 06/13/2017 24  10 - 40 U/L Final    ALT 06/13/2017 26  10 - 44 U/L Final    Anion Gap 06/13/2017 14  8 - 16 mmol/L Final    eGFR if African American 06/13/2017 >60.0  >60 mL/min/1.73 m^2 Final    eGFR if non African American 06/13/2017 >60.0  >60 mL/min/1.73 m^2 Final    Cholesterol  06/13/2017 281* 120 - 199 mg/dL Final    Triglycerides 06/13/2017 231* 30 - 150 mg/dL Final    HDL 06/13/2017 47  40 - 75 mg/dL Final    LDL Cholesterol 06/13/2017 187.8* 63.0 - 159.0 mg/dL Final    HDL/Chol Ratio 06/13/2017 16.7* 20.0 - 50.0 % Final    Total Cholesterol/HDL Ratio 06/13/2017 6.0* 2.0 - 5.0 Final    Non-HDL Cholesterol 06/13/2017 234  mg/dL Final    TSH 06/13/2017 2.899  0.400 - 4.000 uIU/mL Final     Assessment:       No diagnosis found.    Plan:   Health maintenance reviewed.  Continue inderal  follow-up in 6 months      There are no diagnoses linked to this encounter.

## 2018-04-24 ENCOUNTER — OFFICE VISIT (OUTPATIENT)
Dept: OTOLARYNGOLOGY | Facility: CLINIC | Age: 73
End: 2018-04-24
Payer: MEDICARE

## 2018-04-24 VITALS
RESPIRATION RATE: 16 BRPM | HEART RATE: 76 BPM | WEIGHT: 199.94 LBS | SYSTOLIC BLOOD PRESSURE: 130 MMHG | BODY MASS INDEX: 31.38 KG/M2 | HEIGHT: 67 IN | DIASTOLIC BLOOD PRESSURE: 74 MMHG

## 2018-04-24 DIAGNOSIS — H61.23 BILATERAL IMPACTED CERUMEN: Primary | ICD-10-CM

## 2018-04-24 PROCEDURE — 99499 UNLISTED E&M SERVICE: CPT | Mod: S$PBB,,, | Performed by: PHYSICIAN ASSISTANT

## 2018-04-24 PROCEDURE — 69210 REMOVE IMPACTED EAR WAX UNI: CPT | Mod: S$PBB,,, | Performed by: PHYSICIAN ASSISTANT

## 2018-04-24 PROCEDURE — 99999 PR PBB SHADOW E&M-EST. PATIENT-LVL III: CPT | Mod: PBBFAC,,, | Performed by: PHYSICIAN ASSISTANT

## 2018-04-24 PROCEDURE — 99213 OFFICE O/P EST LOW 20 MIN: CPT | Mod: PBBFAC | Performed by: PHYSICIAN ASSISTANT

## 2018-04-24 PROCEDURE — 69210 REMOVE IMPACTED EAR WAX UNI: CPT | Mod: PBBFAC | Performed by: PHYSICIAN ASSISTANT

## 2018-04-24 RX ORDER — TAMOXIFEN CITRATE 20 MG/1
20 TABLET ORAL
COMMUNITY
Start: 2018-04-09 | End: 2018-11-20

## 2018-04-24 NOTE — PROGRESS NOTES
Subjective:   Cerumen impactions     Patient ID: Teresa Orozco is a 72 y.o. female.    Chief Complaint:  Excessive ear wax     Teresa Orozco is a 72 y.o. female here to see me today for evaluation of a possible wax impaction in bilateral ears.  She has complaints of hearing loss in the affected ears, but denies pain or drainage.  This has been an issue in the past.  The patient has not been using any sort of ear drop to soften the wax.  She was last here with me in 11/2017 for ear cleaning.  Since she was last here with me, she's had double mastectomy for breast cancer and is now on Tamoxifen.    HPI  Review of Systems   HENT: Positive for hearing loss. Negative for ear discharge, ear pain and tinnitus.        Objective:     Physical Exam   HENT:   Right Ear: External ear and ear canal normal. Decreased hearing is noted.   Left Ear: External ear and ear canal normal. Decreased hearing is noted.   Bilateral complete cerumen impactions, removal described below       Procedure Note    CHIEF COMPLAINT:  Cerumen Impaction    Description:  The patient was seated in an exam chair.  An ear speculum was placed in the right EAC and was examined under the microscope.  Alligator forceps and/or loop curettes were used to remove a large cerumen impaction.  The tympanic membrane was visualized and was normal in appearance.  The procedure was repeated on the left side in a similar fashion.  The TM was intact and normal on this side as well.  The patient tolerated the procedure well.           Assessment:     1. Bilateral impacted cerumen        Plan:     1.  Cerumen impaction:  Removed today without difficulty.  I would recommend the use of a wax softening drop, either over the counter Debrox or mineral oil, on a weekly basis.  I also instructed the patient to avoid Qtips.

## 2018-06-14 DIAGNOSIS — R60.9 EDEMA, UNSPECIFIED TYPE: ICD-10-CM

## 2018-06-14 RX ORDER — HYDROCHLOROTHIAZIDE 25 MG/1
25 TABLET ORAL DAILY
Qty: 90 TABLET | Refills: 1 | Status: SHIPPED | OUTPATIENT
Start: 2018-06-14 | End: 2018-12-05 | Stop reason: SDUPTHER

## 2018-07-11 DIAGNOSIS — Z86.69 HISTORY OF MIGRAINE HEADACHES: ICD-10-CM

## 2018-07-11 RX ORDER — PROPRANOLOL HYDROCHLORIDE 120 MG/1
CAPSULE, EXTENDED RELEASE ORAL
Qty: 90 CAPSULE | Refills: 3 | Status: SHIPPED | OUTPATIENT
Start: 2018-07-11 | End: 2019-10-02 | Stop reason: SDUPTHER

## 2018-08-13 ENCOUNTER — PATIENT MESSAGE (OUTPATIENT)
Dept: OTOLARYNGOLOGY | Facility: CLINIC | Age: 73
End: 2018-08-13

## 2018-10-10 ENCOUNTER — OFFICE VISIT (OUTPATIENT)
Dept: INTERNAL MEDICINE | Facility: CLINIC | Age: 73
End: 2018-10-10
Payer: MEDICARE

## 2018-10-10 VITALS
WEIGHT: 198.88 LBS | HEIGHT: 66 IN | OXYGEN SATURATION: 96 % | SYSTOLIC BLOOD PRESSURE: 124 MMHG | HEART RATE: 74 BPM | TEMPERATURE: 99 F | DIASTOLIC BLOOD PRESSURE: 76 MMHG | BODY MASS INDEX: 31.96 KG/M2

## 2018-10-10 DIAGNOSIS — G43.909 MIGRAINE WITHOUT STATUS MIGRAINOSUS, NOT INTRACTABLE, UNSPECIFIED MIGRAINE TYPE: ICD-10-CM

## 2018-10-10 DIAGNOSIS — Z17.0 MALIGNANT NEOPLASM OF LEFT BREAST IN FEMALE, ESTROGEN RECEPTOR POSITIVE, UNSPECIFIED SITE OF BREAST: ICD-10-CM

## 2018-10-10 DIAGNOSIS — F41.9 ANXIETY: ICD-10-CM

## 2018-10-10 DIAGNOSIS — R60.9 FLUID RETENTION: ICD-10-CM

## 2018-10-10 DIAGNOSIS — C50.912 MALIGNANT NEOPLASM OF LEFT BREAST IN FEMALE, ESTROGEN RECEPTOR POSITIVE, UNSPECIFIED SITE OF BREAST: ICD-10-CM

## 2018-10-10 DIAGNOSIS — Z00.00 ENCOUNTER FOR PREVENTIVE HEALTH EXAMINATION: Primary | ICD-10-CM

## 2018-10-10 DIAGNOSIS — E78.5 HYPERLIPIDEMIA, UNSPECIFIED HYPERLIPIDEMIA TYPE: ICD-10-CM

## 2018-10-10 PROCEDURE — 99214 OFFICE O/P EST MOD 30 MIN: CPT | Mod: PBBFAC | Performed by: NURSE PRACTITIONER

## 2018-10-10 PROCEDURE — G0439 PPPS, SUBSEQ VISIT: HCPCS | Mod: S$GLB,,, | Performed by: NURSE PRACTITIONER

## 2018-10-10 PROCEDURE — 99999 PR PBB SHADOW E&M-EST. PATIENT-LVL IV: CPT | Mod: PBBFAC,,, | Performed by: NURSE PRACTITIONER

## 2018-10-10 RX ORDER — CHOLECALCIFEROL (VITAMIN D3) 25 MCG
1000 TABLET ORAL DAILY
COMMUNITY
End: 2019-10-02 | Stop reason: SDUPTHER

## 2018-10-10 NOTE — PROGRESS NOTES
"I offered to discuss end of life issues, including information on how to make advance directives that the patient could use to name someone who would make medical decisions on their behalf if they became too ill to make themselves.    ___Patient declined  _X_Patient is interested, and states she has an advanced directive at home. She will bring to her next appt.      Teresa Orozco presented for a  Medicare AWV and comprehensive Health Risk Assessment today. The following components were reviewed and updated:    · Medical history  · Family History  · Social history  · Allergies and Current Medications  · Health Risk Assessment  · Health Maintenance  · Care Team     ** See Completed Assessments for Annual Wellness Visit within the encounter summary.**       The following assessments were completed:  · Living Situation  · CAGE  · Depression Screening  · Timed Get Up and Go  · Whisper Test  · Cognitive Function Screening  · Nutrition Screening  · ADL Screening  · PAQ Screening    Vitals:    10/10/18 1156   BP: 124/76   BP Location: Right arm   Pulse: 74   Temp: 99 °F (37.2 °C)   TempSrc: Tympanic   SpO2: 96%   Weight: 90.2 kg (198 lb 13.7 oz)   Height: 5' 6" (1.676 m)     Body mass index is 32.1 kg/m².  Physical Exam      Diagnoses and health risks identified today and associated recommendations/orders:    1. Encounter for preventive health examination  Reviewed HM and screenings. KELLY signed for colonoscopy with Dr. Escoto and DEXA done this year with Dr. Spicer.    2. Malignant neoplasm of left breast in female, estrogen receptor positive, unspecified site of breast  Patient reports originally diagnosed with left breast ductal carcinoma in 2008. Diagnosed again in 2018 and right breast ductal carcinoma. Bilateral mastectomy March 2018. On tamoxifen. Continue current treatment plan as previously prescribed with your oncologist.    3. Migraine without status migrainosus, not intractable, unspecified migraine type  Stable " and controlled on propanolol. Continue current treatment plan as previously prescribed with your PCP.     4. Hyperlipidemia, unspecified hyperlipidemia type  Lab Results   Component Value Date    CHOL 281 (H) 06/12/2017    CHOL 268 (H) 02/03/2016    CHOL 267 (H) 12/12/2014     Lab Results   Component Value Date    HDL 47 06/12/2017    HDL 41 02/03/2016    HDL 43 12/12/2014     Lab Results   Component Value Date    LDLCALC 187.8 (H) 06/12/2017    LDLCALC 172.2 (H) 02/03/2016    LDLCALC 174.0 (H) 12/12/2014     Lab Results   Component Value Date    TRIG 231 (H) 06/12/2017    TRIG 274 (H) 02/03/2016    TRIG 250 (H) 12/12/2014     Lab Results   Component Value Date    CHOLHDL 16.7 (L) 06/12/2017    CHOLHDL 15.3 (L) 02/03/2016    CHOLHDL 16.1 (L) 12/12/2014       Not controlled. Due for repeat. She does not wish to start statin. Advised to follow up with PCP for further evaluation and treatment    5. Anxiety  Stable. PHQ2-score 4. PHQ9- score 5. Reports taking ativan twice a year. Continue current treatment plan as previously prescribed with your PCP.    6. Fluid retention  Stable and controlled on HCTZ. Continue current treatment plan as previously prescribed with your PCP.         Provided Teresa with a 5-10 year written screening schedule and personal prevention plan. Recommendations were developed using the USPSTF age appropriate recommendations. Education, counseling, and referrals were provided as needed. After Visit Summary printed and given to patient which includes a list of additional screenings\tests needed.    No Follow-up on file.    Teresa Garcia NP

## 2018-10-10 NOTE — PATIENT INSTRUCTIONS
Counseling and Referral of Other Preventative  (Italic type indicates deductible and co-insurance are waived)    Patient Name: Teresa Orozco  Today's Date: 10/10/2018    Health Maintenance       Date Due Completion Date    Lipid Panel 06/12/2018 6/12/2017    Override on 12/12/2014: Done    Mammogram 01/09/2019 1/9/2018    Override on 1/2/2014: Done    DEXA SCAN 01/18/2019 1/18/2016 (Declined)    Override on 1/18/2016: Declined    Colonoscopy 11/24/2024 11/24/2014 (Done)    Override on 11/24/2014: Done    TETANUS VACCINE 12/12/2024 12/12/2014        No orders of the defined types were placed in this encounter.    The following information is provided to all patients.  This information is to help you find resources for any of the problems found today that may be affecting your health:                Living healthy guide: www.[facility].[place].gov      Understanding Diabetes: www.diabetes.org      Eating healthy: www.cdc.gov/healthyweight      [facility] home safety checklist: www.cdc.gov/steadi/patient.html      Agency on Aging: www.goea.[place].[facility]      Alcoholics anonymous (AA): www.aa.org      Physical Activity: www.jorje.nih.gov/cl9wurw      Tobacco use: www.quitwithusla.org

## 2018-10-10 NOTE — Clinical Note
Your patient was seen today for a HRA visit.  I have included a copy of my visit note, please review the note and feel free to contact me with any questions. Thank you for allowing me to participate in the care of your patients. KIM Brooks

## 2018-10-30 ENCOUNTER — OFFICE VISIT (OUTPATIENT)
Dept: OTOLARYNGOLOGY | Facility: CLINIC | Age: 73
End: 2018-10-30
Payer: MEDICARE

## 2018-10-30 VITALS
WEIGHT: 199.94 LBS | BODY MASS INDEX: 32.27 KG/M2 | HEART RATE: 68 BPM | DIASTOLIC BLOOD PRESSURE: 80 MMHG | SYSTOLIC BLOOD PRESSURE: 134 MMHG

## 2018-10-30 DIAGNOSIS — H61.21 IMPACTED CERUMEN OF RIGHT EAR: Primary | ICD-10-CM

## 2018-10-30 PROCEDURE — 69210 REMOVE IMPACTED EAR WAX UNI: CPT | Mod: PBBFAC | Performed by: PHYSICIAN ASSISTANT

## 2018-10-30 PROCEDURE — 99213 OFFICE O/P EST LOW 20 MIN: CPT | Mod: PBBFAC | Performed by: PHYSICIAN ASSISTANT

## 2018-10-30 PROCEDURE — 99499 UNLISTED E&M SERVICE: CPT | Mod: S$PBB,,, | Performed by: PHYSICIAN ASSISTANT

## 2018-10-30 PROCEDURE — 69210 REMOVE IMPACTED EAR WAX UNI: CPT | Mod: S$PBB,,, | Performed by: PHYSICIAN ASSISTANT

## 2018-10-30 PROCEDURE — 99999 PR PBB SHADOW E&M-EST. PATIENT-LVL III: CPT | Mod: PBBFAC,,, | Performed by: PHYSICIAN ASSISTANT

## 2018-10-30 NOTE — PROGRESS NOTES
Subjective:   Cerumen impactions     Patient ID: Teresa Orozco is a 73 y.o. female.    Chief Complaint:  Excessive ear wax     Teresa Orozco is a 73 y.o. female here to see me today for evaluation of a possible wax impaction in bilateral ears.  She has complaints of hearing loss in the affected ears, but denies pain or drainage.  This has been an issue in the past.  She was last here with me in 4/2018 for ear cleaning.  The patient has not been using any sort of ear drop to soften the wax.    HPI  Review of Systems   HENT: Positive for hearing loss. Negative for ear discharge, ear pain and tinnitus.        Objective:     Physical Exam   HENT:   Right Ear: External ear and ear canal normal. Decreased hearing is noted.   Left Ear: External ear and ear canal normal. Decreased hearing is noted.   Right cerumen impaction, removal described below       Procedure Note    CHIEF COMPLAINT:  Cerumen Impaction    Description:  The patient was seated in an exam chair.  An ear speculum was placed in the right EAC and was examined under the microscope.  Alligator forceps and suction were used to remove a moderate cerumen impaction.  The tympanic membrane was visualized and was normal in appearance.   The patient tolerated the procedure well.           Assessment:     1. Impacted cerumen of right ear        Plan:     1.  Cerumen impaction:  Removed today without difficulty.  I would recommend the use of a wax softening drop, either over the counter Debrox or mineral oil, on a weekly basis.  I also instructed the patient to avoid Qtips.

## 2018-11-20 ENCOUNTER — OFFICE VISIT (OUTPATIENT)
Dept: INTERNAL MEDICINE | Facility: CLINIC | Age: 73
End: 2018-11-20
Payer: MEDICARE

## 2018-11-20 VITALS
WEIGHT: 201.06 LBS | DIASTOLIC BLOOD PRESSURE: 82 MMHG | SYSTOLIC BLOOD PRESSURE: 130 MMHG | TEMPERATURE: 99 F | OXYGEN SATURATION: 95 % | BODY MASS INDEX: 32.45 KG/M2 | HEART RATE: 70 BPM

## 2018-11-20 DIAGNOSIS — G43.909 MIGRAINE WITHOUT STATUS MIGRAINOSUS, NOT INTRACTABLE, UNSPECIFIED MIGRAINE TYPE: ICD-10-CM

## 2018-11-20 DIAGNOSIS — Z17.0 BILATERAL MALIGNANT NEOPLASM OF BREAST IN FEMALE, ESTROGEN RECEPTOR POSITIVE, UNSPECIFIED SITE OF BREAST: ICD-10-CM

## 2018-11-20 DIAGNOSIS — E78.5 HYPERLIPIDEMIA, UNSPECIFIED HYPERLIPIDEMIA TYPE: ICD-10-CM

## 2018-11-20 DIAGNOSIS — R60.9 EDEMA, UNSPECIFIED TYPE: ICD-10-CM

## 2018-11-20 DIAGNOSIS — R30.0 DYSURIA: ICD-10-CM

## 2018-11-20 DIAGNOSIS — Z00.00 ANNUAL PHYSICAL EXAM: Primary | ICD-10-CM

## 2018-11-20 DIAGNOSIS — F41.9 ANXIETY: ICD-10-CM

## 2018-11-20 DIAGNOSIS — C50.911 BILATERAL MALIGNANT NEOPLASM OF BREAST IN FEMALE, ESTROGEN RECEPTOR POSITIVE, UNSPECIFIED SITE OF BREAST: ICD-10-CM

## 2018-11-20 DIAGNOSIS — C50.912 BILATERAL MALIGNANT NEOPLASM OF BREAST IN FEMALE, ESTROGEN RECEPTOR POSITIVE, UNSPECIFIED SITE OF BREAST: ICD-10-CM

## 2018-11-20 LAB
ALBUMIN SERPL BCP-MCNC: 3.6 G/DL
ALP SERPL-CCNC: 71 U/L
ALT SERPL W/O P-5'-P-CCNC: 23 U/L
ANION GAP SERPL CALC-SCNC: 9 MMOL/L
AST SERPL-CCNC: 28 U/L
BASOPHILS # BLD AUTO: 0.05 K/UL
BASOPHILS NFR BLD: 0.6 %
BILIRUB SERPL-MCNC: 0.4 MG/DL
BILIRUB SERPL-MCNC: ABNORMAL MG/DL
BLOOD URINE, POC: 250
BUN SERPL-MCNC: 13 MG/DL
CALCIUM SERPL-MCNC: 9.4 MG/DL
CHLORIDE SERPL-SCNC: 104 MMOL/L
CHOLEST SERPL-MCNC: 223 MG/DL
CHOLEST/HDLC SERPL: 5.2 {RATIO}
CO2 SERPL-SCNC: 31 MMOL/L
COLOR, POC UA: ABNORMAL
CREAT SERPL-MCNC: 0.8 MG/DL
DIFFERENTIAL METHOD: ABNORMAL
EOSINOPHIL # BLD AUTO: 0.3 K/UL
EOSINOPHIL NFR BLD: 2.8 %
ERYTHROCYTE [DISTWIDTH] IN BLOOD BY AUTOMATED COUNT: 13.6 %
EST. GFR  (AFRICAN AMERICAN): >60 ML/MIN/1.73 M^2
EST. GFR  (NON AFRICAN AMERICAN): >60 ML/MIN/1.73 M^2
GLUCOSE SERPL-MCNC: 118 MG/DL
GLUCOSE UR QL STRIP: NEGATIVE
HCT VFR BLD AUTO: 41.3 %
HDLC SERPL-MCNC: 43 MG/DL
HDLC SERPL: 19.3 %
HGB BLD-MCNC: 13.6 G/DL
IMM GRANULOCYTES # BLD AUTO: 0.03 K/UL
IMM GRANULOCYTES NFR BLD AUTO: 0.3 %
KETONES UR QL STRIP: NEGATIVE
LDLC SERPL CALC-MCNC: 108.6 MG/DL
LEUKOCYTE ESTERASE URINE, POC: ABNORMAL
LYMPHOCYTES # BLD AUTO: 1.8 K/UL
LYMPHOCYTES NFR BLD: 20.6 %
MCH RBC QN AUTO: 29.3 PG
MCHC RBC AUTO-ENTMCNC: 32.9 G/DL
MCV RBC AUTO: 89 FL
MONOCYTES # BLD AUTO: 0.6 K/UL
MONOCYTES NFR BLD: 7.1 %
NEUTROPHILS # BLD AUTO: 6 K/UL
NEUTROPHILS NFR BLD: 68.6 %
NITRITE, POC UA: POSITIVE
NONHDLC SERPL-MCNC: 180 MG/DL
NRBC BLD-RTO: 0 /100 WBC
PH, POC UA: 5
PLATELET # BLD AUTO: 366 K/UL
PMV BLD AUTO: 10.8 FL
POTASSIUM SERPL-SCNC: 3.8 MMOL/L
PROT SERPL-MCNC: 6.9 G/DL
PROTEIN, POC: ABNORMAL
RBC # BLD AUTO: 4.64 M/UL
SODIUM SERPL-SCNC: 144 MMOL/L
SPECIFIC GRAVITY, POC UA: 1.02
TRIGL SERPL-MCNC: 357 MG/DL
TSH SERPL DL<=0.005 MIU/L-ACNC: 2.96 UIU/ML
UROBILINOGEN, POC UA: 4
WBC # BLD AUTO: 8.78 K/UL

## 2018-11-20 PROCEDURE — 81002 URINALYSIS NONAUTO W/O SCOPE: CPT | Mod: PBBFAC,PO | Performed by: FAMILY MEDICINE

## 2018-11-20 PROCEDURE — 80061 LIPID PANEL: CPT

## 2018-11-20 PROCEDURE — 85025 COMPLETE CBC W/AUTO DIFF WBC: CPT

## 2018-11-20 PROCEDURE — 99214 OFFICE O/P EST MOD 30 MIN: CPT | Mod: PBBFAC,PO | Performed by: FAMILY MEDICINE

## 2018-11-20 PROCEDURE — 99999 PR PBB SHADOW E&M-EST. PATIENT-LVL IV: CPT | Mod: PBBFAC,,, | Performed by: FAMILY MEDICINE

## 2018-11-20 PROCEDURE — 84443 ASSAY THYROID STIM HORMONE: CPT

## 2018-11-20 PROCEDURE — 80053 COMPREHEN METABOLIC PANEL: CPT

## 2018-11-20 PROCEDURE — 99214 OFFICE O/P EST MOD 30 MIN: CPT | Mod: S$PBB,,, | Performed by: FAMILY MEDICINE

## 2018-11-20 RX ORDER — DOXYCYCLINE HYCLATE 100 MG
100 TABLET ORAL 2 TIMES DAILY
Qty: 20 TABLET | Refills: 0 | Status: SHIPPED | OUTPATIENT
Start: 2018-11-20 | End: 2018-11-20

## 2018-11-20 RX ORDER — DOXYCYCLINE HYCLATE 100 MG
100 TABLET ORAL 2 TIMES DAILY
Qty: 20 TABLET | Refills: 0 | Status: SHIPPED | OUTPATIENT
Start: 2018-11-20 | End: 2019-07-17 | Stop reason: SDUPTHER

## 2018-11-20 NOTE — PROGRESS NOTES
Subjective:       Patient ID: Teresa Orozco is a 73 y.o. female.    Chief Complaint: Annual Exam and possible UTI    Annual exam.  Follow-up migraine headache fluid retention anxiety hyperlipidemia hypokalemia.  She has had several days duration of dysuria with no fever chills or hematuria.  Since her last visit here she was diagnosed with left and right breast cancer having bilateral mastectomy.  She saw a GI for esophageal reflux and continues on Nexium.  She had difficulty having a bowel movement.  She had a fecal scan done and found to have a rectocele.  This is been corrected with physical therapy.  Edema continues.  Migraine headaches are stable.  She continues with propranolol.  Last colonoscopy was done 3 years ago and negative.  Immunizations are up-to-date including flu immunization.  She is concerned about a sense of shakiness at times.  She denies tremor. Her mother had parkinsonism.  This is chronic and stable.  She also has hyperhidrosis which is stable.      Review of Systems   Constitutional: Positive for fatigue. Negative for activity change, appetite change and unexpected weight change.        Recent fatigue.  Appetite unchanged   HENT: Negative for congestion, dental problem, ear pain, hearing loss, sneezing, sore throat, tinnitus and trouble swallowing.    Eyes: Negative for pain, redness, itching and visual disturbance.   Respiratory: Negative for cough, chest tightness, shortness of breath and wheezing.    Cardiovascular: Negative for chest pain, palpitations and leg swelling.   Gastrointestinal: Positive for constipation. Negative for abdominal distention, abdominal pain, blood in stool, diarrhea, nausea and vomiting.        Improved with rectocele exercises   Genitourinary: Positive for dysuria. Negative for difficulty urinating, frequency, hematuria and urgency.   Musculoskeletal: Negative for arthralgias, back pain, joint swelling, neck pain and neck stiffness.   Skin: Negative for rash and  wound.   Neurological: Negative for dizziness, tremors, syncope, weakness, light-headedness, numbness and headaches.        Feel shaky at times   Hematological: Negative for adenopathy. Does not bruise/bleed easily.   Psychiatric/Behavioral: Negative for agitation, dysphoric mood and sleep disturbance. The patient is nervous/anxious.         Uses occasional Ativan       Objective:      Physical Exam   Constitutional: She is oriented to person, place, and time. She appears well-developed and well-nourished.   HENT:   Right Ear: External ear normal.   Left Ear: External ear normal.   Nose: Nose normal.   Mouth/Throat: Oropharynx is clear and moist.   Eyes: Conjunctivae and EOM are normal. Pupils are equal, round, and reactive to light.   Neck: Normal range of motion. Neck supple. No thyromegaly present.   Cardiovascular: Normal rate, regular rhythm and normal heart sounds.   No murmur heard.  Pulmonary/Chest: Effort normal and breath sounds normal. She has no wheezes. She has no rales.   Abdominal: Soft. Bowel sounds are normal. She exhibits no distension and no mass. There is no tenderness.   Musculoskeletal: She exhibits no edema or tenderness.   Lymphadenopathy:     She has no cervical adenopathy.   Neurological: She is alert and oriented to person, place, and time. She has normal reflexes. She displays normal reflexes. No cranial nerve deficit. She exhibits normal muscle tone. Coordination normal.   No head or hand tremor   Skin: Skin is warm and dry. No rash noted. She is not diaphoretic.   Psychiatric: She has a normal mood and affect. Her behavior is normal. Judgment and thought content normal.       Office Visit on 06/12/2017   Component Date Value Ref Range Status    WBC 06/12/2017 9.35  3.90 - 12.70 K/uL Final    RBC 06/12/2017 4.97  4.00 - 5.40 M/uL Final    Hemoglobin 06/12/2017 14.8  12.0 - 16.0 g/dL Final    Hematocrit 06/12/2017 44.6  37.0 - 48.5 % Final    MCV 06/12/2017 90  82 - 98 fL Final     MCH 06/12/2017 29.8  27.0 - 31.0 pg Final    MCHC 06/12/2017 33.2  32.0 - 36.0 % Final    RDW 06/12/2017 13.8  11.5 - 14.5 % Final    Platelets 06/12/2017 384* 150 - 350 K/uL Final    MPV 06/12/2017 11.3  9.2 - 12.9 fL Final    Gran # (ANC) 06/12/2017 6.4  1.8 - 7.7 K/uL Final    Lymph # 06/12/2017 1.8  1.0 - 4.8 K/uL Final    Mono # 06/12/2017 0.8  0.3 - 1.0 K/uL Final    Eos # 06/12/2017 0.3  0.0 - 0.5 K/uL Final    Baso # 06/12/2017 0.05  0.00 - 0.20 K/uL Final    Gran% 06/12/2017 68.6  38.0 - 73.0 % Final    Lymph% 06/12/2017 18.8  18.0 - 48.0 % Final    Mono% 06/12/2017 8.7  4.0 - 15.0 % Final    Eosinophil% 06/12/2017 3.1  0.0 - 8.0 % Final    Basophil% 06/12/2017 0.5  0.0 - 1.9 % Final    Differential Method 06/12/2017 Automated   Final    Specimen UA 06/12/2017 Urine, Clean Catch   Final    Color, UA 06/12/2017 Yellow  Yellow, Straw, Johanne Final    Appearance, UA 06/12/2017 Hazy* Clear Final    pH, UA 06/12/2017 7.0  5.0 - 8.0 Final    Specific Gravity, UA 06/12/2017 1.020  1.005 - 1.030 Final    Protein, UA 06/12/2017 Negative  Negative Final    Glucose, UA 06/12/2017 Negative  Negative Final    Ketones, UA 06/12/2017 Negative  Negative Final    Bilirubin (UA) 06/12/2017 1+* Negative Final    Occult Blood UA 06/12/2017 Negative  Negative Final    Nitrite, UA 06/12/2017 Negative  Negative Final    Urobilinogen, UA 06/12/2017 Negative  <2.0 EU/dL Final    Leukocytes, UA 06/12/2017 Negative  Negative Final    Sodium 06/12/2017 142  136 - 145 mmol/L Final    Potassium 06/12/2017 3.8  3.5 - 5.1 mmol/L Final    Chloride 06/12/2017 101  95 - 110 mmol/L Final    CO2 06/12/2017 27  23 - 29 mmol/L Final    Glucose 06/12/2017 112* 70 - 110 mg/dL Final    BUN, Bld 06/12/2017 19  8 - 23 mg/dL Final    Creatinine 06/12/2017 0.8  0.5 - 1.4 mg/dL Final    Calcium 06/12/2017 9.9  8.7 - 10.5 mg/dL Final    Total Protein 06/12/2017 7.6  6.0 - 8.4 g/dL Final    Albumin 06/12/2017 4.0  3.5  - 5.2 g/dL Final    Total Bilirubin 06/12/2017 0.4  0.1 - 1.0 mg/dL Final    Alkaline Phosphatase 06/12/2017 102  55 - 135 U/L Final    AST 06/12/2017 24  10 - 40 U/L Final    ALT 06/12/2017 26  10 - 44 U/L Final    Anion Gap 06/12/2017 14  8 - 16 mmol/L Final    eGFR if African American 06/12/2017 >60.0  >60 mL/min/1.73 m^2 Final    eGFR if non African American 06/12/2017 >60.0  >60 mL/min/1.73 m^2 Final    Cholesterol 06/12/2017 281* 120 - 199 mg/dL Final    Triglycerides 06/12/2017 231* 30 - 150 mg/dL Final    HDL 06/12/2017 47  40 - 75 mg/dL Final    LDL Cholesterol 06/12/2017 187.8* 63.0 - 159.0 mg/dL Final    HDL/Chol Ratio 06/12/2017 16.7* 20.0 - 50.0 % Final    Total Cholesterol/HDL Ratio 06/12/2017 6.0* 2.0 - 5.0 Final    Non-HDL Cholesterol 06/12/2017 234  mg/dL Final    TSH 06/12/2017 2.899  0.400 - 4.000 uIU/mL Final     Assessment:       1. Annual physical exam    2. Hyperlipidemia, unspecified hyperlipidemia type    3. Edema, unspecified type    4. Bilateral malignant neoplasm of breast in female, estrogen receptor positive, unspecified site of breast    5. Dysuria        Plan:     Lab was ordered to include CBC urinalysis CMP lipids TSH.  Medications reviewed.  Observation on the sense of shakiness.  Consider neurological evaluation if progresses.  She has hyperlipidemia and is not interested in taking any medications.  Low-cholesterol diet was discussed.  Urinalysis noted. Doxycycline for 10 days    Annual physical exam    Hyperlipidemia, unspecified hyperlipidemia type  -     Lipid panel    Edema, unspecified type  -     CBC auto differential  -     Comprehensive metabolic panel  -     TSH    Bilateral malignant neoplasm of breast in female, estrogen receptor positive, unspecified site of breast    Dysuria  -     POCT URINE DIPSTICK WITHOUT MICROSCOPE

## 2018-11-21 ENCOUNTER — PATIENT MESSAGE (OUTPATIENT)
Dept: INTERNAL MEDICINE | Facility: CLINIC | Age: 73
End: 2018-11-21

## 2018-11-23 ENCOUNTER — TELEPHONE (OUTPATIENT)
Dept: INTERNAL MEDICINE | Facility: CLINIC | Age: 73
End: 2018-11-23

## 2018-11-23 NOTE — TELEPHONE ENCOUNTER
----- Message from Rebeca Zhou sent at 11/23/2018 11:23 AM CST -----  Contact: self  Returning call.Please call back at [phone].      Thanks,  Rebeca Zhou

## 2018-12-05 DIAGNOSIS — R60.9 EDEMA, UNSPECIFIED TYPE: ICD-10-CM

## 2018-12-05 RX ORDER — HYDROCHLOROTHIAZIDE 25 MG/1
25 TABLET ORAL DAILY
Qty: 90 TABLET | Refills: 1 | Status: SHIPPED | OUTPATIENT
Start: 2018-12-05 | End: 2019-05-31 | Stop reason: SDUPTHER

## 2019-01-16 ENCOUNTER — PATIENT MESSAGE (OUTPATIENT)
Dept: INTERNAL MEDICINE | Facility: CLINIC | Age: 74
End: 2019-01-16

## 2019-01-17 ENCOUNTER — PATIENT MESSAGE (OUTPATIENT)
Dept: INTERNAL MEDICINE | Facility: CLINIC | Age: 74
End: 2019-01-17

## 2019-02-25 PROBLEM — Z00.00 ANNUAL PHYSICAL EXAM: Status: RESOLVED | Noted: 2017-06-12 | Resolved: 2019-02-25

## 2019-05-16 ENCOUNTER — OFFICE VISIT (OUTPATIENT)
Dept: OTOLARYNGOLOGY | Facility: CLINIC | Age: 74
End: 2019-05-16
Payer: MEDICARE

## 2019-05-16 ENCOUNTER — CLINICAL SUPPORT (OUTPATIENT)
Dept: AUDIOLOGY | Facility: CLINIC | Age: 74
End: 2019-05-16
Payer: MEDICARE

## 2019-05-16 VITALS
WEIGHT: 200.81 LBS | SYSTOLIC BLOOD PRESSURE: 138 MMHG | TEMPERATURE: 95 F | DIASTOLIC BLOOD PRESSURE: 81 MMHG | HEIGHT: 66 IN | BODY MASS INDEX: 32.27 KG/M2 | HEART RATE: 74 BPM

## 2019-05-16 DIAGNOSIS — H93.13 TINNITUS, BILATERAL: ICD-10-CM

## 2019-05-16 DIAGNOSIS — H90.3 SENSORINEURAL HEARING LOSS (SNHL) OF BOTH EARS: Primary | ICD-10-CM

## 2019-05-16 DIAGNOSIS — H93.13 TINNITUS OF BOTH EARS: ICD-10-CM

## 2019-05-16 DIAGNOSIS — H90.3 SENSORINEURAL HEARING LOSS, BILATERAL: Primary | ICD-10-CM

## 2019-05-16 PROCEDURE — 92557 COMPREHENSIVE HEARING TEST: CPT | Mod: PBBFAC | Performed by: AUDIOLOGIST-HEARING AID FITTER

## 2019-05-16 PROCEDURE — 92567 TYMPANOMETRY: CPT | Mod: PBBFAC | Performed by: AUDIOLOGIST-HEARING AID FITTER

## 2019-05-16 PROCEDURE — 99213 PR OFFICE/OUTPT VISIT, EST, LEVL III, 20-29 MIN: ICD-10-PCS | Mod: S$PBB,,, | Performed by: PHYSICIAN ASSISTANT

## 2019-05-16 PROCEDURE — 99999 PR PBB SHADOW E&M-EST. PATIENT-LVL III: CPT | Mod: PBBFAC,,, | Performed by: PHYSICIAN ASSISTANT

## 2019-05-16 PROCEDURE — 99213 OFFICE O/P EST LOW 20 MIN: CPT | Mod: PBBFAC | Performed by: PHYSICIAN ASSISTANT

## 2019-05-16 PROCEDURE — 99999 PR PBB SHADOW E&M-EST. PATIENT-LVL III: ICD-10-PCS | Mod: PBBFAC,,, | Performed by: PHYSICIAN ASSISTANT

## 2019-05-16 PROCEDURE — 99213 OFFICE O/P EST LOW 20 MIN: CPT | Mod: S$PBB,,, | Performed by: PHYSICIAN ASSISTANT

## 2019-05-16 NOTE — PROGRESS NOTES
"Referring provider: Genet Moody PA-C    Teresa Orozco was seen 05/16/2019 for an audiological evaluation.  Patient complains of bilateral fullness and pressure in both ears.  She does not appreciate hearing loss but has noticed difficulty with speech clarity.  She has history of tinnitus in the bilateral ear for several years.  She wears earplugs for sleep and can "hear her heartbeat" only when wearing the plugs.  She has had carotid U/S screen in past at healthcare events. Her las hearing test was twenty years ago.  She has not recently started any new medications.  Her blood pressure medication dosage has been lowered. She has started a no sugar diet.     Results reveal normal symmetric hearing 250-2000 Hz sloping to a mild sensorineural hearing loss 0723-4684 Hz for the right ear, and a mild-to-moderate sensorineural hearing loss 1553-5765 Hz for the left ear.   Speech Reception Thresholds were 20 dBHL for the right ear and 20 dBHL for the left ear.   Word recognition scores were excellent for the right ear and excellent for the left ear.   Tympanograms were Type A, normal for the right ear and Type A, normal for the left ear.    Patient was counseled on the above findings.    Recommendations:  1. ENT review. Return to clinic if any sudden change in hearing, or if pulsatile tinnitus presents when not wearing earplugs.   2. Annual audiograms to monitor hearing        "

## 2019-05-17 NOTE — PROGRESS NOTES
Subjective:       Patient ID: Teresa Orozco is a 73 y.o. female.    Chief Complaint: Ear Fullness (pt is here for 6 month ear cleaning. )    Patient is a very pleasant 73 y.o. female here to see me today for the first time for evaluation of hearing loss.  She says both ears feel clogged over past 2 weeks and she thinks it's due to wax.  She has been turning up TV louder and asking people to repeat themselves.  She has not noted any difference in hearing between the ears, with either ear being the better hearing ear.  She has noted intermittent tinnitus in both ears for years.  She has not had any recent issues with ear pain or ear drainage.  She denies a family history of hearing loss, and has not had any previous otologic surgery.  She denies any history of significant loud noise exposure. She has issues with dizziness but reports recent issues with drastic blood pressure fluctuations.      Review of Systems   Constitutional: Negative for fever.   HENT: Positive for hearing loss, rhinorrhea and tinnitus. Negative for congestion, ear discharge, ear pain, sinus pressure and sinus pain.    Eyes: Negative for visual disturbance.   Musculoskeletal: Negative for gait problem.   Neurological: Positive for dizziness and light-headedness.       Objective:      Physical Exam   Constitutional: She is oriented to person, place, and time. She appears well-developed and well-nourished. She is cooperative. No distress.   HENT:   Head: Normocephalic and atraumatic.   Right Ear: Tympanic membrane, external ear and ear canal normal.   Left Ear: Tympanic membrane, external ear and ear canal normal.   Nose: Mucosal edema present. No rhinorrhea, nasal deformity or septal deviation.   Mouth/Throat: Uvula is midline, oropharynx is clear and moist and mucous membranes are normal. Mucous membranes are not pale and not dry. Normal dentition. No oropharyngeal exudate or posterior oropharyngeal erythema.   Scant cerumen in canals (removed  today with alligator forceps and otomicroscope)   Eyes: Pupils are equal, round, and reactive to light. Conjunctivae, EOM and lids are normal. Right eye exhibits no chemosis. Left eye exhibits no chemosis. Right conjunctiva is not injected. Left conjunctiva is not injected. No scleral icterus. Right eye exhibits normal extraocular motion and no nystagmus. Left eye exhibits normal extraocular motion and no nystagmus.   Neck: Trachea normal and phonation normal. No tracheal tenderness present. No tracheal deviation present. No thyroid mass and no thyromegaly present.   Cardiovascular: Intact distal pulses.   Pulmonary/Chest: Effort normal. No stridor. No respiratory distress.   Abdominal: She exhibits no distension.   Lymphadenopathy:        Head (right side): No submental and no submandibular adenopathy present.        Head (left side): No submental and no submandibular adenopathy present.     She has no cervical adenopathy.   Neurological: She is alert and oriented to person, place, and time. No cranial nerve deficit.   Skin: Skin is warm and dry. No rash noted. No erythema.   Psychiatric: She has a normal mood and affect. Her behavior is normal.         AUDIOGRAM:  Results reveal normal symmetric hearing 250-2000 Hz sloping to a mild sensorineural hearing loss 0546-0510 Hz for the right ear, and a mild-to-moderate sensorineural hearing loss 6481-5548 Hz for the left ear.   Speech Reception Thresholds were 20 dBHL for the right ear and 20 dBHL for the left ear.   Word recognition scores were excellent for the right ear and excellent for the left ear.   Tympanograms were Type A, normal for the right ear and Type A, normal for the left ear.            Assessment:       1. Sensorineural hearing loss (SNHL) of both ears    2. Tinnitus of both ears        Plan:         Her exam today showed scant cerumen, not enough to cause CHL.  I recommended audiogram which she agreed to schedule.  It showed mild SNHL AU.  Recommend  annual audiograms, sooner if any sudden change in hearing, or if pulsatile tinnitus presents when not wearing earplugs.  Also hearing protection when around loud noise, including lawn equipment.    Tinnitus:  We reviewed her audiogram together in detail.  We also discussed that tinnitus is most often caused by a hearing loss, and that as the hair cells are damaged, either genetic or as a result of loud noise exposure, they then cause tinnitus.  Some patients find that restricting the salt or caffeine in their diet helps, and there is also an OTC supplement, lipoflavinoids, that some people find to be effective though their benefit is not fully proven.  Tinnitus tends to be louder in times of stress and fatigue, and may decrease with time.  Sound machines may also be an effective masking technique if needed at night.    RTC as needed for ear cleaning; otherwise annually.

## 2019-05-28 ENCOUNTER — PATIENT MESSAGE (OUTPATIENT)
Dept: INTERNAL MEDICINE | Facility: CLINIC | Age: 74
End: 2019-05-28

## 2019-05-28 RX ORDER — PROPRANOLOL HYDROCHLORIDE 60 MG/1
60 CAPSULE, EXTENDED RELEASE ORAL DAILY
Refills: 0 | COMMUNITY
Start: 2019-05-17 | End: 2019-05-28 | Stop reason: SDUPTHER

## 2019-05-29 ENCOUNTER — PATIENT MESSAGE (OUTPATIENT)
Dept: INTERNAL MEDICINE | Facility: CLINIC | Age: 74
End: 2019-05-29

## 2019-05-29 RX ORDER — PROPRANOLOL HYDROCHLORIDE 60 MG/1
60 CAPSULE, EXTENDED RELEASE ORAL DAILY
Qty: 90 CAPSULE | Refills: 1 | Status: SHIPPED | OUTPATIENT
Start: 2019-05-29 | End: 2019-11-06 | Stop reason: SDUPTHER

## 2019-05-31 DIAGNOSIS — R60.9 EDEMA, UNSPECIFIED TYPE: ICD-10-CM

## 2019-06-03 RX ORDER — HYDROCHLOROTHIAZIDE 25 MG/1
25 TABLET ORAL DAILY
Qty: 90 TABLET | Refills: 1 | Status: SHIPPED | OUTPATIENT
Start: 2019-06-03 | End: 2019-11-05 | Stop reason: SDUPTHER

## 2019-07-17 ENCOUNTER — OFFICE VISIT (OUTPATIENT)
Dept: INTERNAL MEDICINE | Facility: CLINIC | Age: 74
End: 2019-07-17
Payer: MEDICARE

## 2019-07-17 VITALS
WEIGHT: 201.75 LBS | BODY MASS INDEX: 32.42 KG/M2 | HEART RATE: 77 BPM | SYSTOLIC BLOOD PRESSURE: 137 MMHG | OXYGEN SATURATION: 96 % | TEMPERATURE: 98 F | DIASTOLIC BLOOD PRESSURE: 85 MMHG | HEIGHT: 66 IN

## 2019-07-17 DIAGNOSIS — C50.911 BILATERAL MALIGNANT NEOPLASM OF BREAST IN FEMALE, ESTROGEN RECEPTOR POSITIVE, UNSPECIFIED SITE OF BREAST: ICD-10-CM

## 2019-07-17 DIAGNOSIS — C50.912 BILATERAL MALIGNANT NEOPLASM OF BREAST IN FEMALE, ESTROGEN RECEPTOR POSITIVE, UNSPECIFIED SITE OF BREAST: ICD-10-CM

## 2019-07-17 DIAGNOSIS — Z17.0 BILATERAL MALIGNANT NEOPLASM OF BREAST IN FEMALE, ESTROGEN RECEPTOR POSITIVE, UNSPECIFIED SITE OF BREAST: ICD-10-CM

## 2019-07-17 DIAGNOSIS — R30.0 DYSURIA: Primary | ICD-10-CM

## 2019-07-17 PROCEDURE — 99999 PR PBB SHADOW E&M-EST. PATIENT-LVL IV: ICD-10-PCS | Mod: PBBFAC,,, | Performed by: FAMILY MEDICINE

## 2019-07-17 PROCEDURE — 99999 PR PBB SHADOW E&M-EST. PATIENT-LVL IV: CPT | Mod: PBBFAC,,, | Performed by: FAMILY MEDICINE

## 2019-07-17 PROCEDURE — 99214 OFFICE O/P EST MOD 30 MIN: CPT | Mod: PBBFAC,PO | Performed by: FAMILY MEDICINE

## 2019-07-17 PROCEDURE — 81002 URINALYSIS NONAUTO W/O SCOPE: CPT | Mod: PBBFAC,PO | Performed by: FAMILY MEDICINE

## 2019-07-17 PROCEDURE — 99213 OFFICE O/P EST LOW 20 MIN: CPT | Mod: S$PBB,,, | Performed by: FAMILY MEDICINE

## 2019-07-17 PROCEDURE — 99213 PR OFFICE/OUTPT VISIT, EST, LEVL III, 20-29 MIN: ICD-10-PCS | Mod: S$PBB,,, | Performed by: FAMILY MEDICINE

## 2019-07-17 RX ORDER — VIT C/E/ZN/COPPR/LUTEIN/ZEAXAN 250MG-90MG
1000 CAPSULE ORAL
COMMUNITY
End: 2023-04-17

## 2019-07-17 RX ORDER — ACETAMINOPHEN 325 MG/1
325 TABLET ORAL
COMMUNITY

## 2019-07-17 RX ORDER — SODIUM PICOSULFATE, MAGNESIUM OXIDE, AND ANHYDROUS CITRIC ACID 10; 3.5; 12 MG/160ML; G/160ML; G/160ML
LIQUID ORAL
Refills: 0 | COMMUNITY
Start: 2019-05-17 | End: 2019-10-02

## 2019-07-17 RX ORDER — DOXYCYCLINE HYCLATE 100 MG
100 TABLET ORAL 2 TIMES DAILY
Qty: 20 TABLET | Refills: 0 | Status: SHIPPED | OUTPATIENT
Start: 2019-07-17 | End: 2019-10-02

## 2019-07-17 RX ORDER — TAMOXIFEN CITRATE 20 MG/1
20 TABLET ORAL DAILY
COMMUNITY
Start: 2019-06-10 | End: 2023-04-17

## 2019-07-17 RX ORDER — PROPRANOLOL HYDROCHLORIDE 60 MG/1
60 CAPSULE, EXTENDED RELEASE ORAL DAILY
COMMUNITY
End: 2020-09-14

## 2019-07-17 NOTE — PROGRESS NOTES
Subjective:       Patient ID: Teresa Orozco is a 73 y.o. female.    Chief Complaint: Urinary Tract Infection (pain on urination and streak of blood)    Onset yesterday dysuria hematuria urgency.  She reports that she drinks plenty of water.  She last had a urinary infection several years ago.  She denies fever chills or abdominal pain    Dysuria    This is a recurrent problem. The current episode started yesterday. The problem occurs every urination. The problem has been rapidly worsening. The quality of the pain is described as aching. The pain is at a severity of 5/10. The pain is mild. There has been no fever. She is not sexually active. There is no history of pyelonephritis. Associated symptoms include frequency, hematuria and urgency. Pertinent negatives include no behavior changes, chills, discharge, flank pain, hesitancy, nausea, possible pregnancy, sweats, vomiting, weight loss, constipation, rash or withholding. She has tried home medications and increased fluids for the symptoms. The treatment provided no relief. There is no history of catheterization, diabetes insipidus, diabetes mellitus, genitourinary reflux, hypertension, kidney stones, recurrent UTIs, a single kidney, STD, urinary stasis or a urological procedure.     Review of Systems   Constitutional: Negative for chills, fever and weight loss.   Gastrointestinal: Negative for abdominal pain, constipation, nausea and vomiting.   Genitourinary: Positive for dysuria, frequency, hematuria and urgency. Negative for flank pain and hesitancy.   Skin: Negative for rash.       Objective:      Physical Exam   Constitutional: She appears well-developed and well-nourished. No distress.   Abdominal: Soft. Bowel sounds are normal. She exhibits no distension and no mass. There is no tenderness.       Office Visit on 11/20/2018   Component Date Value Ref Range Status    WBC 11/20/2018 8.78  3.90 - 12.70 K/uL Final    RBC 11/20/2018 4.64  4.00 - 5.40 M/uL Final     Hemoglobin 11/20/2018 13.6  12.0 - 16.0 g/dL Final    Hematocrit 11/20/2018 41.3  37.0 - 48.5 % Final    Mean Corpuscular Volume 11/20/2018 89  82 - 98 fL Final    Mean Corpuscular Hemoglobin 11/20/2018 29.3  27.0 - 31.0 pg Final    Mean Corpuscular Hemoglobin Conc 11/20/2018 32.9  32.0 - 36.0 g/dL Final    RDW 11/20/2018 13.6  11.5 - 14.5 % Final    Platelets 11/20/2018 366* 150 - 350 K/uL Final    MPV 11/20/2018 10.8  9.2 - 12.9 fL Final    Immature Granulocytes 11/20/2018 0.3  0.0 - 0.5 % Final    Gran # (ANC) 11/20/2018 6.0  1.8 - 7.7 K/uL Final    Immature Grans (Abs) 11/20/2018 0.03  0.00 - 0.04 K/uL Final    Lymph # 11/20/2018 1.8  1.0 - 4.8 K/uL Final    Mono # 11/20/2018 0.6  0.3 - 1.0 K/uL Final    Eos # 11/20/2018 0.3  0.0 - 0.5 K/uL Final    Baso # 11/20/2018 0.05  0.00 - 0.20 K/uL Final    nRBC 11/20/2018 0  0 /100 WBC Final    Gran% 11/20/2018 68.6  38.0 - 73.0 % Final    Lymph% 11/20/2018 20.6  18.0 - 48.0 % Final    Mono% 11/20/2018 7.1  4.0 - 15.0 % Final    Eosinophil% 11/20/2018 2.8  0.0 - 8.0 % Final    Basophil% 11/20/2018 0.6  0.0 - 1.9 % Final    Differential Method 11/20/2018 Automated   Final    Sodium 11/20/2018 144  136 - 145 mmol/L Final    Potassium 11/20/2018 3.8  3.5 - 5.1 mmol/L Final    Chloride 11/20/2018 104  95 - 110 mmol/L Final    CO2 11/20/2018 31* 23 - 29 mmol/L Final    Glucose 11/20/2018 118* 70 - 110 mg/dL Final    BUN, Bld 11/20/2018 13  8 - 23 mg/dL Final    Creatinine 11/20/2018 0.8  0.5 - 1.4 mg/dL Final    Calcium 11/20/2018 9.4  8.7 - 10.5 mg/dL Final    Total Protein 11/20/2018 6.9  6.0 - 8.4 g/dL Final    Albumin 11/20/2018 3.6  3.5 - 5.2 g/dL Final    Total Bilirubin 11/20/2018 0.4  0.1 - 1.0 mg/dL Final    Alkaline Phosphatase 11/20/2018 71  55 - 135 U/L Final    AST 11/20/2018 28  10 - 40 U/L Final    ALT 11/20/2018 23  10 - 44 U/L Final    Anion Gap 11/20/2018 9  8 - 16 mmol/L Final    eGFR if  11/20/2018  >60.0  >60 mL/min/1.73 m^2 Final    eGFR if non African American 11/20/2018 >60.0  >60 mL/min/1.73 m^2 Final    Cholesterol 11/20/2018 223* 120 - 199 mg/dL Final    Triglycerides 11/20/2018 357* 30 - 150 mg/dL Final    HDL 11/20/2018 43  40 - 75 mg/dL Final    LDL Cholesterol 11/20/2018 108.6  63.0 - 159.0 mg/dL Final    Hdl/Cholesterol Ratio 11/20/2018 19.3* 20.0 - 50.0 % Final    Total Cholesterol/HDL Ratio 11/20/2018 5.2* 2.0 - 5.0 Final    Non-HDL Cholesterol 11/20/2018 180  mg/dL Final    TSH 11/20/2018 2.961  0.400 - 4.000 uIU/mL Final    Color, UA 11/20/2018 linda   Final    Spec Grav UA 11/20/2018 1.020   Final    pH, UA 11/20/2018 5   Final    WBC, UA 11/20/2018 ++   Final    Nitrite, UA 11/20/2018 positive   Final    Protein 11/20/2018 ++   Final    Glucose, UA 11/20/2018 negative   Final    Ketones, UA 11/20/2018 negative   Final    Urobilinogen, UA 11/20/2018 4   Final    Bilirubin 11/20/2018 ++   Final    Blood, UA 11/20/2018 250   Final     Assessment:       1. Dysuria        Plan:      U/a doxycycline for 10 days continue increase water intake.  Follow-up urinalysis in 2 weeks.  She is status post mastectomy for breast cancer with current inflammatory disease of the chest wall.  This is followed by Oncology    Dysuria  -     POCT urine dipstick without microscope

## 2019-07-18 LAB
BILIRUB SERPL-MCNC: NEGATIVE MG/DL
BLOOD URINE, POC: 250
COLOR, POC UA: ABNORMAL
GLUCOSE UR QL STRIP: NORMAL
KETONES UR QL STRIP: NEGATIVE
LEUKOCYTE ESTERASE URINE, POC: ABNORMAL
NITRITE, POC UA: NEGATIVE
PH, POC UA: 6
PROTEIN, POC: ABNORMAL
SPECIFIC GRAVITY, POC UA: 1.01
UROBILINOGEN, POC UA: NORMAL

## 2019-08-06 ENCOUNTER — PATIENT MESSAGE (OUTPATIENT)
Dept: INTERNAL MEDICINE | Facility: CLINIC | Age: 74
End: 2019-08-06

## 2019-08-13 ENCOUNTER — CLINICAL SUPPORT (OUTPATIENT)
Dept: INTERNAL MEDICINE | Facility: CLINIC | Age: 74
End: 2019-08-13
Payer: MEDICARE

## 2019-08-13 DIAGNOSIS — R30.0 DYSURIA: ICD-10-CM

## 2019-08-13 LAB
BILIRUB UR QL STRIP: NEGATIVE
CLARITY UR REFRACT.AUTO: CLEAR
COLOR UR AUTO: YELLOW
GLUCOSE UR QL STRIP: NEGATIVE
HGB UR QL STRIP: NEGATIVE
KETONES UR QL STRIP: NEGATIVE
LEUKOCYTE ESTERASE UR QL STRIP: NEGATIVE
NITRITE UR QL STRIP: NEGATIVE
PH UR STRIP: 7 [PH] (ref 5–8)
PROT UR QL STRIP: NEGATIVE
SP GR UR STRIP: 1.01 (ref 1–1.03)
URN SPEC COLLECT METH UR: NORMAL

## 2019-08-13 PROCEDURE — 81003 URINALYSIS AUTO W/O SCOPE: CPT

## 2019-10-02 ENCOUNTER — HOSPITAL ENCOUNTER (OUTPATIENT)
Dept: RADIOLOGY | Facility: HOSPITAL | Age: 74
Discharge: HOME | End: 2019-10-02
Attending: FAMILY MEDICINE
Payer: MEDICARE

## 2019-10-02 ENCOUNTER — OFFICE VISIT (OUTPATIENT)
Dept: INTERNAL MEDICINE | Facility: CLINIC | Age: 74
End: 2019-10-02
Payer: MEDICARE

## 2019-10-02 VITALS
TEMPERATURE: 98 F | HEART RATE: 70 BPM | SYSTOLIC BLOOD PRESSURE: 122 MMHG | DIASTOLIC BLOOD PRESSURE: 70 MMHG | HEIGHT: 66 IN | OXYGEN SATURATION: 96 % | WEIGHT: 200.81 LBS | BODY MASS INDEX: 32.27 KG/M2

## 2019-10-02 DIAGNOSIS — Z00.00 ANNUAL PHYSICAL EXAM: Primary | ICD-10-CM

## 2019-10-02 DIAGNOSIS — F41.9 ANXIETY: ICD-10-CM

## 2019-10-02 DIAGNOSIS — E78.5 HYPERLIPIDEMIA, UNSPECIFIED HYPERLIPIDEMIA TYPE: ICD-10-CM

## 2019-10-02 DIAGNOSIS — R06.00 DYSPNEA, UNSPECIFIED TYPE: ICD-10-CM

## 2019-10-02 DIAGNOSIS — Z17.0 BILATERAL MALIGNANT NEOPLASM OF BREAST IN FEMALE, ESTROGEN RECEPTOR POSITIVE, UNSPECIFIED SITE OF BREAST: ICD-10-CM

## 2019-10-02 DIAGNOSIS — T67.5XXD HEAT EXHAUSTION, SUBSEQUENT ENCOUNTER: ICD-10-CM

## 2019-10-02 DIAGNOSIS — M54.50 LOW BACK PAIN, NON-SPECIFIC: ICD-10-CM

## 2019-10-02 DIAGNOSIS — C50.911 BILATERAL MALIGNANT NEOPLASM OF BREAST IN FEMALE, ESTROGEN RECEPTOR POSITIVE, UNSPECIFIED SITE OF BREAST: ICD-10-CM

## 2019-10-02 DIAGNOSIS — E55.9 VITAMIN D DEFICIENCY DISEASE: ICD-10-CM

## 2019-10-02 DIAGNOSIS — M19.90 OSTEOARTHRITIS, UNSPECIFIED OSTEOARTHRITIS TYPE, UNSPECIFIED SITE: ICD-10-CM

## 2019-10-02 DIAGNOSIS — R60.9 FLUID RETENTION: ICD-10-CM

## 2019-10-02 DIAGNOSIS — C50.912 BILATERAL MALIGNANT NEOPLASM OF BREAST IN FEMALE, ESTROGEN RECEPTOR POSITIVE, UNSPECIFIED SITE OF BREAST: ICD-10-CM

## 2019-10-02 DIAGNOSIS — Z86.69 HISTORY OF MIGRAINE HEADACHES: ICD-10-CM

## 2019-10-02 PROBLEM — T67.5XXA HEAT EXHAUSTION: Status: ACTIVE | Noted: 2019-10-02

## 2019-10-02 LAB
BASOPHILS # BLD AUTO: 0.05 K/UL (ref 0–0.2)
BASOPHILS NFR BLD: 0.6 % (ref 0–1.9)
BILIRUB UR QL STRIP: ABNORMAL
CLARITY UR REFRACT.AUTO: ABNORMAL
COLOR UR AUTO: ABNORMAL
DIFFERENTIAL METHOD: ABNORMAL
EOSINOPHIL # BLD AUTO: 0.2 K/UL (ref 0–0.5)
EOSINOPHIL NFR BLD: 2.6 % (ref 0–8)
ERYTHROCYTE [DISTWIDTH] IN BLOOD BY AUTOMATED COUNT: 13.8 % (ref 11.5–14.5)
GLUCOSE UR QL STRIP: NEGATIVE
HCT VFR BLD AUTO: 42.4 % (ref 37–48.5)
HGB BLD-MCNC: 13.5 G/DL (ref 12–16)
HGB UR QL STRIP: NEGATIVE
IMM GRANULOCYTES # BLD AUTO: 0.04 K/UL (ref 0–0.04)
IMM GRANULOCYTES NFR BLD AUTO: 0.5 % (ref 0–0.5)
KETONES UR QL STRIP: NEGATIVE
LEUKOCYTE ESTERASE UR QL STRIP: NEGATIVE
LYMPHOCYTES # BLD AUTO: 2.1 K/UL (ref 1–4.8)
LYMPHOCYTES NFR BLD: 26.1 % (ref 18–48)
MCH RBC QN AUTO: 29 PG (ref 27–31)
MCHC RBC AUTO-ENTMCNC: 31.8 G/DL (ref 32–36)
MCV RBC AUTO: 91 FL (ref 82–98)
MONOCYTES # BLD AUTO: 0.6 K/UL (ref 0.3–1)
MONOCYTES NFR BLD: 7.7 % (ref 4–15)
NEUTROPHILS # BLD AUTO: 5.1 K/UL (ref 1.8–7.7)
NEUTROPHILS NFR BLD: 62.5 % (ref 38–73)
NITRITE UR QL STRIP: NEGATIVE
NRBC BLD-RTO: 0 /100 WBC
PH UR STRIP: 6 [PH] (ref 5–8)
PLATELET # BLD AUTO: 331 K/UL (ref 150–350)
PMV BLD AUTO: 11.1 FL (ref 9.2–12.9)
PROT UR QL STRIP: NEGATIVE
RBC # BLD AUTO: 4.65 M/UL (ref 4–5.4)
SP GR UR STRIP: 1.03 (ref 1–1.03)
URN SPEC COLLECT METH UR: ABNORMAL
WBC # BLD AUTO: 8.13 K/UL (ref 3.9–12.7)

## 2019-10-02 PROCEDURE — 71046 X-RAY EXAM CHEST 2 VIEWS: CPT | Mod: TC

## 2019-10-02 PROCEDURE — 99215 PR OFFICE/OUTPT VISIT, EST, LEVL V, 40-54 MIN: ICD-10-PCS | Mod: S$PBB,,, | Performed by: FAMILY MEDICINE

## 2019-10-02 PROCEDURE — 85025 COMPLETE CBC W/AUTO DIFF WBC: CPT

## 2019-10-02 PROCEDURE — 72110 XR LUMBAR SPINE COMPLETE 5 VIEW: ICD-10-PCS | Mod: 26,,, | Performed by: RADIOLOGY

## 2019-10-02 PROCEDURE — 81003 URINALYSIS AUTO W/O SCOPE: CPT

## 2019-10-02 PROCEDURE — 80061 LIPID PANEL: CPT

## 2019-10-02 PROCEDURE — 80053 COMPREHEN METABOLIC PANEL: CPT

## 2019-10-02 PROCEDURE — 72110 X-RAY EXAM L-2 SPINE 4/>VWS: CPT | Mod: 26,,, | Performed by: RADIOLOGY

## 2019-10-02 PROCEDURE — 90662 IIV NO PRSV INCREASED AG IM: CPT | Mod: PBBFAC,PO

## 2019-10-02 PROCEDURE — 99999 PR PBB SHADOW E&M-EST. PATIENT-LVL IV: ICD-10-PCS | Mod: PBBFAC,,, | Performed by: FAMILY MEDICINE

## 2019-10-02 PROCEDURE — 99999 PR PBB SHADOW E&M-EST. PATIENT-LVL IV: CPT | Mod: PBBFAC,,, | Performed by: FAMILY MEDICINE

## 2019-10-02 PROCEDURE — 99214 OFFICE O/P EST MOD 30 MIN: CPT | Mod: PBBFAC,25,PO | Performed by: FAMILY MEDICINE

## 2019-10-02 PROCEDURE — 71046 XR CHEST PA AND LATERAL: ICD-10-PCS | Mod: 26,,, | Performed by: RADIOLOGY

## 2019-10-02 PROCEDURE — 72110 X-RAY EXAM L-2 SPINE 4/>VWS: CPT | Mod: TC

## 2019-10-02 PROCEDURE — 99215 OFFICE O/P EST HI 40 MIN: CPT | Mod: S$PBB,,, | Performed by: FAMILY MEDICINE

## 2019-10-02 PROCEDURE — 71046 X-RAY EXAM CHEST 2 VIEWS: CPT | Mod: 26,,, | Performed by: RADIOLOGY

## 2019-10-02 PROCEDURE — 82306 VITAMIN D 25 HYDROXY: CPT

## 2019-10-02 PROCEDURE — 84443 ASSAY THYROID STIM HORMONE: CPT

## 2019-10-02 NOTE — PROGRESS NOTES
Subjective:       Patient ID: Teresa Orozco is a 74 y.o. female.    Chief Complaint: Annual Exam    Annual exam.  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  She reports increased stress a nodule was noticed in the right chest wall.  She has a history of breast cancer.  Breast surgery evaluation was done ultrasound was done nodule was felt to be benign.  She has had some increased migraine headaches recently.  She thinks it is related to ongoing stress.  Her gastroenterologist reduced for panel all 120 mg to 60 mg for migraine prevention.  She uses Ativan occasionally for anxiety.  She tried is stop hydrochlorothiazide 25 mg and had fluid retention.  She has had orthopedic evaluation for degenerative joint disease of her knees.  She is involved on avoiding weight-bearing exercises.  She reports she had a recent episode while working in the [employer] 1 morning that she does not usually do a feel lightheaded nauseated she when indoors rested and eventually stopped.  She has had no recurrences.  She had no chest pain palpitations at that time.  She also reports she has some shortness of breath with exercise.  She has not exercised in a while and recently started.  She has chronic low back pain treated by chiropractic in the past.  It is painful to touch.  Physical therapy recommended a back x-ray.    Review of Systems   Constitutional: Positive for activity change. Negative for appetite change, diaphoresis, fatigue, fever and unexpected weight change.   HENT: Positive for congestion and hearing loss. Negative for dental problem, ear pain, rhinorrhea, sneezing, sore throat, tinnitus and trouble swallowing.         Chronic allergic rhinitis using Nasacort   Eyes: Negative for pain, discharge, redness, itching and visual disturbance.    Respiratory: Positive for shortness of breath. Negative for cough, chest tightness and wheezing.    Cardiovascular: Positive for palpitations and leg swelling. Negative for chest pain.        Denies lightheadedness except for heat  related situation   Gastrointestinal: Negative for abdominal distention, abdominal pain, blood in stool, constipation, diarrhea, nausea and vomiting.        Colonoscopy up-to-date.  Polyp removed.   Endocrine: Negative for polydipsia and polyuria.   Genitourinary: Negative for difficulty urinating, dysuria, frequency, hematuria, urgency and vaginal pain.   Musculoskeletal: Positive for arthralgias, back pain, joint swelling and neck pain. Negative for neck stiffness.   Skin: Negative for rash and wound.   Neurological: Positive for weakness and headaches. Negative for dizziness, tremors, syncope, light-headedness and numbness.   Hematological: Negative for adenopathy. Does not bruise/bleed easily.   Psychiatric/Behavioral: Positive for dysphoric mood. Negative for agitation, confusion and sleep disturbance. The patient is not nervous/anxious.        Objective:      Physical Exam   Constitutional: She is oriented to person, place, and time. She appears well-developed and well-nourished.   HENT:   Right Ear: External ear normal.   Left Ear: External ear normal.   Nose: Nose normal.   Mouth/Throat: Oropharynx is clear and moist.   Eyes: Pupils are equal, round, and reactive to light. Conjunctivae and EOM are normal.   Neck: Normal range of motion. Neck supple. No JVD present. No thyromegaly present.   Cardiovascular: Normal rate, regular rhythm and normal heart sounds. Exam reveals no gallop and no friction rub.   No murmur heard.  Pulmonary/Chest: Effort normal and breath sounds normal. No respiratory distress. She has no wheezes. She has no rales.   Abdominal: Soft. Bowel sounds are normal. She exhibits no distension and no mass. There is no tenderness.   Musculoskeletal: She exhibits no  edema or tenderness.   Degenerative joint disease the knees.  She has localized tenderness in the left lumbar region with no redness or deformity no swelling   Lymphadenopathy:     She has no cervical adenopathy.   Neurological: She is alert and oriented to person, place, and time. She has normal reflexes. She displays normal reflexes. No cranial nerve deficit. She exhibits normal muscle tone. Coordination normal.   Skin: Skin is warm and dry. No rash noted. She is not diaphoretic.   Psychiatric: She has a normal mood and affect. Her behavior is normal. Judgment and thought content normal.       Clinical Support on 08/13/2019   Component Date Value Ref Range Status    Specimen UA 08/13/2019 Urine, Clean Catch   Final    Color, UA 08/13/2019 Yellow  Yellow, Straw, Johanne Final    Appearance, UA 08/13/2019 Clear  Clear Final    pH, UA 08/13/2019 7.0  5.0 - 8.0 Final    Specific Gravity, UA 08/13/2019 1.015  1.005 - 1.030 Final    Protein, UA 08/13/2019 Negative  Negative Final    Glucose, UA 08/13/2019 Negative  Negative Final    Ketones, UA 08/13/2019 Negative  Negative Final    Bilirubin (UA) 08/13/2019 Negative  Negative Final    Occult Blood UA 08/13/2019 Negative  Negative Final    Nitrite, UA 08/13/2019 Negative  Negative Final    Leukocytes, UA 08/13/2019 Negative  Negative Final     Assessment:       1. Dyspnea, unspecified type    2. Low back pain, non-specific    3. Bilateral malignant neoplasm of breast in female, estrogen receptor positive, unspecified site of breast    4. Annual physical exam    5. Anxiety    6. Osteoarthritis, unspecified osteoarthritis type, unspecified site    7. Hyperlipidemia, unspecified hyperlipidemia type    8. Vitamin D deficiency disease        Plan:     Lab was ordered to include CBC urinalysis CMP lipids TSH vitamin-D level.  Chest x-ray for shortness of breath.  X-ray a back for localized tenderness of the back with a history breast cancer.  Continue  hydrochlorothiazide for fluid retention.  Continue Ativan if needed for anxiety.  Discussed nonweightbearing exercise regards arthritis of her knees.  She is using elliptical type exercise program at present.  She feels her knees feel better after this.  Shortness of breath possibly related to deconditioning.  Episode lightheadedness in the heat probably he related.  Health maintenance reviewed.  Flu VAX given today.  Needs shingles immunization at pharmacy.  Follow-up in 2 weeks.  Medications reviewed.    Dyspnea, unspecified type  -     X-Ray Chest PA And Lateral; Future; Expected date: 10/02/2019  -     CBC auto differential  -     Urinalysis  -     Comprehensive metabolic panel  -     TSH    Low back pain, non-specific  -     X-Ray Lumbar Spine Complete 5 View; Future; Expected date: 10/02/2019    Bilateral malignant neoplasm of breast in female, estrogen receptor positive, unspecified site of breast    Annual physical exam    Anxiety    Osteoarthritis, unspecified osteoarthritis type, unspecified site    Hyperlipidemia, unspecified hyperlipidemia type  -     Lipid panel    Vitamin D deficiency disease  -     Vitamin D    Other orders  -     Influenza - High Dose (65+) (PF) (IM)

## 2019-10-03 ENCOUNTER — PATIENT MESSAGE (OUTPATIENT)
Dept: INTERNAL MEDICINE | Facility: CLINIC | Age: 74
End: 2019-10-03

## 2019-10-03 LAB
25(OH)D3+25(OH)D2 SERPL-MCNC: 51 NG/ML (ref 30–96)
ALBUMIN SERPL BCP-MCNC: 3.9 G/DL (ref 3.5–5.2)
ALP SERPL-CCNC: 65 U/L (ref 55–135)
ALT SERPL W/O P-5'-P-CCNC: 25 U/L (ref 10–44)
ANION GAP SERPL CALC-SCNC: 9 MMOL/L (ref 8–16)
AST SERPL-CCNC: 24 U/L (ref 10–40)
BILIRUB SERPL-MCNC: 0.4 MG/DL (ref 0.1–1)
BUN SERPL-MCNC: 14 MG/DL (ref 8–23)
CALCIUM SERPL-MCNC: 9.8 MG/DL (ref 8.7–10.5)
CHLORIDE SERPL-SCNC: 101 MMOL/L (ref 95–110)
CHOLEST SERPL-MCNC: 213 MG/DL (ref 120–199)
CHOLEST/HDLC SERPL: 5 {RATIO} (ref 2–5)
CO2 SERPL-SCNC: 32 MMOL/L (ref 23–29)
CREAT SERPL-MCNC: 0.8 MG/DL (ref 0.5–1.4)
EST. GFR  (AFRICAN AMERICAN): >60 ML/MIN/1.73 M^2
EST. GFR  (NON AFRICAN AMERICAN): >60 ML/MIN/1.73 M^2
GLUCOSE SERPL-MCNC: 115 MG/DL (ref 70–110)
HDLC SERPL-MCNC: 43 MG/DL (ref 40–75)
HDLC SERPL: 20.2 % (ref 20–50)
LDLC SERPL CALC-MCNC: 115.2 MG/DL (ref 63–159)
NONHDLC SERPL-MCNC: 170 MG/DL
POTASSIUM SERPL-SCNC: 3.6 MMOL/L (ref 3.5–5.1)
PROT SERPL-MCNC: 7.3 G/DL (ref 6–8.4)
SODIUM SERPL-SCNC: 142 MMOL/L (ref 136–145)
TRIGL SERPL-MCNC: 274 MG/DL (ref 30–150)
TSH SERPL DL<=0.005 MIU/L-ACNC: 2.08 UIU/ML (ref 0.4–4)

## 2019-10-30 ENCOUNTER — OFFICE VISIT (OUTPATIENT)
Dept: INTERNAL MEDICINE | Facility: CLINIC | Age: 74
End: 2019-10-30
Payer: MEDICARE

## 2019-10-30 VITALS
BODY MASS INDEX: 32.03 KG/M2 | TEMPERATURE: 97 F | DIASTOLIC BLOOD PRESSURE: 64 MMHG | SYSTOLIC BLOOD PRESSURE: 118 MMHG | OXYGEN SATURATION: 97 % | HEART RATE: 75 BPM | HEIGHT: 66 IN | WEIGHT: 199.31 LBS

## 2019-10-30 DIAGNOSIS — M54.50 LOW BACK PAIN, NON-SPECIFIC: ICD-10-CM

## 2019-10-30 DIAGNOSIS — M25.50 ARTHRALGIA, UNSPECIFIED JOINT: Primary | ICD-10-CM

## 2019-10-30 PROBLEM — R06.00 DYSPNEA: Status: RESOLVED | Noted: 2019-10-02 | Resolved: 2019-10-30

## 2019-10-30 PROBLEM — R30.0 DYSURIA: Status: RESOLVED | Noted: 2018-11-20 | Resolved: 2019-10-30

## 2019-10-30 PROBLEM — G89.29 CHRONIC RIGHT SHOULDER PAIN: Status: RESOLVED | Noted: 2017-04-26 | Resolved: 2019-10-30

## 2019-10-30 PROBLEM — T67.5XXA HEAT EXHAUSTION: Status: RESOLVED | Noted: 2019-10-02 | Resolved: 2019-10-30

## 2019-10-30 PROBLEM — E87.6 HYPOKALEMIA: Status: RESOLVED | Noted: 2017-06-12 | Resolved: 2019-10-30

## 2019-10-30 PROBLEM — M25.511 CHRONIC RIGHT SHOULDER PAIN: Status: RESOLVED | Noted: 2017-04-26 | Resolved: 2019-10-30

## 2019-10-30 PROCEDURE — 99999 PR PBB SHADOW E&M-EST. PATIENT-LVL III: CPT | Mod: PBBFAC,,, | Performed by: FAMILY MEDICINE

## 2019-10-30 PROCEDURE — 99213 PR OFFICE/OUTPT VISIT, EST, LEVL III, 20-29 MIN: ICD-10-PCS | Mod: S$PBB,,, | Performed by: FAMILY MEDICINE

## 2019-10-30 PROCEDURE — 99999 PR PBB SHADOW E&M-EST. PATIENT-LVL III: ICD-10-PCS | Mod: PBBFAC,,, | Performed by: FAMILY MEDICINE

## 2019-10-30 PROCEDURE — 99213 OFFICE O/P EST LOW 20 MIN: CPT | Mod: S$PBB,,, | Performed by: FAMILY MEDICINE

## 2019-10-30 PROCEDURE — 99213 OFFICE O/P EST LOW 20 MIN: CPT | Mod: PBBFAC,PO | Performed by: FAMILY MEDICINE

## 2019-10-30 NOTE — PROGRESS NOTES
Subjective:       Patient ID: Teresa Orozco is a 74 y.o. female.    Chief Complaint: follow up test result and Generalized Body Aches    She has osteoarthritis previously followed by Orthopedics.  She is also on tamoxifen.  She is having some joint discomfort back discomfort.  She feels some of her pain is increased after starting physical therapy that she thought was more aggressive than she could handle.  Previous shortness of breath is resolved.  Overall she feels well.  She wants to review  recent lab    Review of Systems   Constitutional: Negative for activity change, fatigue and unexpected weight change.   HENT: Negative for hearing loss, rhinorrhea and trouble swallowing.    Eyes: Negative for discharge and visual disturbance.   Respiratory: Negative for chest tightness and wheezing.    Cardiovascular: Negative for chest pain and palpitations.   Gastrointestinal: Positive for constipation. Negative for blood in stool, diarrhea and vomiting.   Endocrine: Negative for polydipsia and polyuria.   Genitourinary: Negative for difficulty urinating, dysuria, hematuria and menstrual problem.   Musculoskeletal: Positive for arthralgias, joint swelling and neck pain.   Neurological: Negative for weakness and headaches.   Psychiatric/Behavioral: Negative for confusion and dysphoric mood.       Objective:      Physical Exam   Constitutional: She appears well-developed and well-nourished. No distress.   Cardiovascular: Normal rate and regular rhythm.   No murmur heard.  Pulmonary/Chest: Effort normal and breath sounds normal. She has no wheezes. She has no rales.       Office Visit on 10/02/2019   Component Date Value Ref Range Status    WBC 10/02/2019 8.13  3.90 - 12.70 K/uL Final    RBC 10/02/2019 4.65  4.00 - 5.40 M/uL Final    Hemoglobin 10/02/2019 13.5  12.0 - 16.0 g/dL Final    Hematocrit 10/02/2019 42.4  37.0 - 48.5 % Final    Mean Corpuscular Volume 10/02/2019 91  82 - 98 fL Final    Mean Corpuscular  Hemoglobin 10/02/2019 29.0  27.0 - 31.0 pg Final    Mean Corpuscular Hemoglobin Conc 10/02/2019 31.8* 32.0 - 36.0 g/dL Final    RDW 10/02/2019 13.8  11.5 - 14.5 % Final    Platelets 10/02/2019 331  150 - 350 K/uL Final    MPV 10/02/2019 11.1  9.2 - 12.9 fL Final    Immature Granulocytes 10/02/2019 0.5  0.0 - 0.5 % Final    Gran # (ANC) 10/02/2019 5.1  1.8 - 7.7 K/uL Final    Immature Grans (Abs) 10/02/2019 0.04  0.00 - 0.04 K/uL Final    Lymph # 10/02/2019 2.1  1.0 - 4.8 K/uL Final    Mono # 10/02/2019 0.6  0.3 - 1.0 K/uL Final    Eos # 10/02/2019 0.2  0.0 - 0.5 K/uL Final    Baso # 10/02/2019 0.05  0.00 - 0.20 K/uL Final    nRBC 10/02/2019 0  0 /100 WBC Final    Gran% 10/02/2019 62.5  38.0 - 73.0 % Final    Lymph% 10/02/2019 26.1  18.0 - 48.0 % Final    Mono% 10/02/2019 7.7  4.0 - 15.0 % Final    Eosinophil% 10/02/2019 2.6  0.0 - 8.0 % Final    Basophil% 10/02/2019 0.6  0.0 - 1.9 % Final    Differential Method 10/02/2019 Automated   Final    Specimen UA 10/02/2019 Urine, Clean Catch   Final    Color, UA 10/02/2019 Johanne  Yellow, Straw, Johanne Final    Appearance, UA 10/02/2019 Hazy* Clear Final    pH, UA 10/02/2019 6.0  5.0 - 8.0 Final    Specific [place], UA 10/02/2019 1.030  1.005 - 1.030 Final    Protein, UA 10/02/2019 Negative  Negative Final    Glucose, UA 10/02/2019 Negative  Negative Final    Ketones, UA 10/02/2019 Negative  Negative Final    Bilirubin (UA) 10/02/2019 1+* Negative Final    Occult Blood UA 10/02/2019 Negative  Negative Final    Nitrite, UA 10/02/2019 Negative  Negative Final    Leukocytes, UA 10/02/2019 Negative  Negative Final    Sodium 10/02/2019 142  136 - 145 mmol/L Final    Potassium 10/02/2019 3.6  3.5 - 5.1 mmol/L Final    Chloride 10/02/2019 101  95 - 110 mmol/L Final    CO2 10/02/2019 32* 23 - 29 mmol/L Final    Glucose 10/02/2019 115* 70 - 110 mg/dL Final    BUN, Bld 10/02/2019 14  8 - 23 mg/dL Final    Creatinine 10/02/2019 0.8  0.5 - 1.4 mg/dL  Final    Calcium 10/02/2019 9.8  8.7 - 10.5 mg/dL Final    Total Protein 10/02/2019 7.3  6.0 - 8.4 g/dL Final    Albumin 10/02/2019 3.9  3.5 - 5.2 g/dL Final    Total Bilirubin 10/02/2019 0.4  0.1 - 1.0 mg/dL Final    Alkaline Phosphatase 10/02/2019 65  55 - 135 U/L Final    AST 10/02/2019 24  10 - 40 U/L Final    ALT 10/02/2019 25  10 - 44 U/L Final    Anion Gap 10/02/2019 9  8 - 16 mmol/L Final    eGFR if African American 10/02/2019 >60.0  >60 mL/min/1.73 m^2 Final    eGFR if non African American 10/02/2019 >60.0  >60 mL/min/1.73 m^2 Final    Cholesterol 10/02/2019 213* 120 - 199 mg/dL Final    Triglycerides 10/02/2019 274* 30 - 150 mg/dL Final    HDL 10/02/2019 43  40 - 75 mg/dL Final    LDL Cholesterol 10/02/2019 115.2  63.0 - 159.0 mg/dL Final    Hdl/Cholesterol Ratio 10/02/2019 20.2  20.0 - 50.0 % Final    Total Cholesterol/HDL Ratio 10/02/2019 5.0  2.0 - 5.0 Final    Non-HDL Cholesterol 10/02/2019 170  mg/dL Final    TSH 10/02/2019 2.085  0.400 - 4.000 uIU/mL Final    Vit D, 25-Hydroxy 10/02/2019 51  30 - 96 ng/mL Final     Assessment:       No diagnosis found.    Plan:     Lab reviewed.  Health maintenance reviewed.  Discussed walking as and exercise.  She is also followed by Oncology.    There are no diagnoses linked to this encounter.

## 2019-11-05 DIAGNOSIS — R60.9 EDEMA, UNSPECIFIED TYPE: ICD-10-CM

## 2019-11-05 RX ORDER — HYDROCHLOROTHIAZIDE 25 MG/1
25 TABLET ORAL DAILY
Qty: 90 TABLET | Refills: 1 | Status: SHIPPED | OUTPATIENT
Start: 2019-11-05 | End: 2020-05-08

## 2019-11-07 RX ORDER — PROPRANOLOL HYDROCHLORIDE 60 MG/1
60 CAPSULE, EXTENDED RELEASE ORAL DAILY
Qty: 90 CAPSULE | Refills: 1 | Status: SHIPPED | OUTPATIENT
Start: 2019-11-07 | End: 2020-03-31

## 2019-12-05 ENCOUNTER — OFFICE VISIT (OUTPATIENT)
Dept: OTOLARYNGOLOGY | Facility: CLINIC | Age: 74
End: 2019-12-05
Payer: MEDICARE

## 2019-12-05 ENCOUNTER — IMMUNIZATION (OUTPATIENT)
Dept: PHARMACY | Facility: CLINIC | Age: 74
End: 2019-12-05
Payer: MEDICARE

## 2019-12-05 VITALS
WEIGHT: 202.38 LBS | BODY MASS INDEX: 32.67 KG/M2 | TEMPERATURE: 99 F | HEART RATE: 81 BPM | DIASTOLIC BLOOD PRESSURE: 81 MMHG | SYSTOLIC BLOOD PRESSURE: 143 MMHG

## 2019-12-05 DIAGNOSIS — H61.23 CERUMINOSIS, BILATERAL: Primary | ICD-10-CM

## 2019-12-05 PROCEDURE — 99999 PR PBB SHADOW E&M-EST. PATIENT-LVL III: CPT | Mod: PBBFAC,,, | Performed by: PHYSICIAN ASSISTANT

## 2019-12-05 PROCEDURE — 69210 REMOVE IMPACTED EAR WAX UNI: CPT | Mod: PBBFAC | Performed by: PHYSICIAN ASSISTANT

## 2019-12-05 PROCEDURE — 99213 OFFICE O/P EST LOW 20 MIN: CPT | Mod: PBBFAC | Performed by: PHYSICIAN ASSISTANT

## 2019-12-05 PROCEDURE — 99999 PR PBB SHADOW E&M-EST. PATIENT-LVL III: ICD-10-PCS | Mod: PBBFAC,,, | Performed by: PHYSICIAN ASSISTANT

## 2019-12-05 PROCEDURE — 69210 REMOVE IMPACTED EAR WAX UNI: CPT | Mod: S$PBB,,, | Performed by: PHYSICIAN ASSISTANT

## 2019-12-05 PROCEDURE — 69210 PR REMOVAL IMPACTED CERUMEN REQUIRING INSTRUMENTATION, UNILATERAL: ICD-10-PCS | Mod: S$PBB,,, | Performed by: PHYSICIAN ASSISTANT

## 2019-12-05 PROCEDURE — 99499 UNLISTED E&M SERVICE: CPT | Mod: S$PBB,,, | Performed by: PHYSICIAN ASSISTANT

## 2019-12-05 PROCEDURE — 99499 NO LOS: ICD-10-PCS | Mod: S$PBB,,, | Performed by: PHYSICIAN ASSISTANT

## 2019-12-05 NOTE — PROGRESS NOTES
Subjective:   Cerumen impactions     Patient ID: Teresa Orozco is a 74 y.o. female.    Chief Complaint:  Excessive ear wax     Teresa Orozco is a 74 y.o. female here to see me today for evaluation of a possible wax impaction in bilateral ears.  She has complaints of hearing loss in the affected ears, but denies pain or drainage.  This has been an issue in the past.  The patient has not been using any sort of ear drop to soften the wax.  When she was last here with me in 5/2019, she reports issues with drastic blood pressure fluctuations and dizziness; significantly better after Propranolol dose was decreased.      HPI  Review of Systems   HENT: Positive for hearing loss. Negative for ear discharge, ear pain and tinnitus.        Objective:     Physical Exam   HENT:   Right Ear: External ear and ear canal normal. Decreased hearing is noted.   Left Ear: External ear and ear canal normal. Decreased hearing is noted.   Scant amount of flaky cerumen AU, removal described below       Procedure Note    CHIEF COMPLAINT:  Cerumen Impaction    Description:  The patient was seated in an exam chair.  An ear speculum was placed in the right EAC and was examined under the microscope.  Alligator forceps were used to remove a small amount of cerumen.  The tympanic membrane was visualized and was normal in appearance.  The procedure was repeated on the left side in a similar fashion.  The TM was intact and normal on this side as well.  The patient tolerated the procedure well.           Assessment:     1. Ceruminosis, bilateral        Plan:     1.  Cerumen impaction:  Removed today without difficulty.  I would recommend the use of a wax softening drop, either over the counter Debrox or mineral oil, on a weekly basis.  I also instructed the patient to avoid Qtips.  RTC as needed for ear cleaning.

## 2020-01-06 PROBLEM — Z00.00 ANNUAL PHYSICAL EXAM: Status: RESOLVED | Noted: 2017-06-12 | Resolved: 2020-01-06

## 2020-02-28 ENCOUNTER — IMMUNIZATION (OUTPATIENT)
Dept: PHARMACY | Facility: CLINIC | Age: 75
End: 2020-02-28
Payer: MEDICARE

## 2020-03-05 ENCOUNTER — OFFICE VISIT (OUTPATIENT)
Dept: INTERNAL MEDICINE | Facility: CLINIC | Age: 75
End: 2020-03-05
Payer: MEDICARE

## 2020-03-05 VITALS
OXYGEN SATURATION: 97 % | HEIGHT: 66 IN | WEIGHT: 195.56 LBS | BODY MASS INDEX: 31.43 KG/M2 | HEART RATE: 75 BPM | TEMPERATURE: 98 F

## 2020-03-05 DIAGNOSIS — R22.2 FULLNESS OF SUPRACLAVICULAR FOSSA: Primary | ICD-10-CM

## 2020-03-05 PROCEDURE — 99999 PR PBB SHADOW E&M-EST. PATIENT-LVL IV: ICD-10-PCS | Mod: PBBFAC,,, | Performed by: FAMILY MEDICINE

## 2020-03-05 PROCEDURE — 99213 OFFICE O/P EST LOW 20 MIN: CPT | Mod: S$PBB,,, | Performed by: FAMILY MEDICINE

## 2020-03-05 PROCEDURE — 99214 OFFICE O/P EST MOD 30 MIN: CPT | Mod: PBBFAC | Performed by: FAMILY MEDICINE

## 2020-03-05 PROCEDURE — 99999 PR PBB SHADOW E&M-EST. PATIENT-LVL IV: CPT | Mod: PBBFAC,,, | Performed by: FAMILY MEDICINE

## 2020-03-05 PROCEDURE — 99213 PR OFFICE/OUTPT VISIT, EST, LEVL III, 20-29 MIN: ICD-10-PCS | Mod: S$PBB,,, | Performed by: FAMILY MEDICINE

## 2020-03-05 NOTE — PROGRESS NOTES
Subjective:       Patient ID: Teresa Orozco is a 74 y.o. female.    Chief Complaint: knot on neck    She received shingrx  1 week ago.  Several days ago she noticed some swelling and discomfort in the right supraclavicular area.  She is status post bilateral mastectomy for breast cancer.  She denies fever chills.  She denies nasal congestion postnasal drip sore throat.  She denies any redness soreness at the shot site    Review of Systems   Constitutional: Negative for chills and fever.   HENT: Negative for congestion.    Respiratory: Negative for cough.    Cardiovascular: Negative for chest pain and palpitations.   Neurological: Negative for headaches.       Objective:      Physical Exam   Constitutional: She appears well-developed and well-nourished. No distress.   HENT:   Right Ear: External ear normal.   Left Ear: External ear normal.   Nose: Nose normal.   Mouth/Throat: Oropharynx is clear and moist.   Eyes: Conjunctivae are normal.   Neck:   Fullness the right supraclavicular area suggestive of lipoma palpation with vague sense of tenderness suggestive of muscle tissue.  No definite left node configuration   Cardiovascular: Normal rate and regular rhythm.   Pulmonary/Chest: Effort normal and breath sounds normal. She has no wheezes. She has no rales.       Office Visit on 10/02/2019   Component Date Value Ref Range Status    WBC 10/02/2019 8.13  3.90 - 12.70 K/uL Final    RBC 10/02/2019 4.65  4.00 - 5.40 M/uL Final    Hemoglobin 10/02/2019 13.5  12.0 - 16.0 g/dL Final    Hematocrit 10/02/2019 42.4  37.0 - 48.5 % Final    Mean Corpuscular Volume 10/02/2019 91  82 - 98 fL Final    Mean Corpuscular Hemoglobin 10/02/2019 29.0  27.0 - 31.0 pg Final    Mean Corpuscular Hemoglobin Conc 10/02/2019 31.8* 32.0 - 36.0 g/dL Final    RDW 10/02/2019 13.8  11.5 - 14.5 % Final    Platelets 10/02/2019 331  150 - 350 K/uL Final    MPV 10/02/2019 11.1  9.2 - 12.9 fL Final    Immature Granulocytes 10/02/2019 0.5  0.0 -  0.5 % Final    Gran # (ANC) 10/02/2019 5.1  1.8 - 7.7 K/uL Final    Immature Grans (Abs) 10/02/2019 0.04  0.00 - 0.04 K/uL Final    Lymph # 10/02/2019 2.1  1.0 - 4.8 K/uL Final    Mono # 10/02/2019 0.6  0.3 - 1.0 K/uL Final    Eos # 10/02/2019 0.2  0.0 - 0.5 K/uL Final    Baso # 10/02/2019 0.05  0.00 - 0.20 K/uL Final    nRBC 10/02/2019 0  0 /100 WBC Final    Gran% 10/02/2019 62.5  38.0 - 73.0 % Final    Lymph% 10/02/2019 26.1  18.0 - 48.0 % Final    Mono% 10/02/2019 7.7  4.0 - 15.0 % Final    Eosinophil% 10/02/2019 2.6  0.0 - 8.0 % Final    Basophil% 10/02/2019 0.6  0.0 - 1.9 % Final    Differential Method 10/02/2019 Automated   Final    Specimen UA 10/02/2019 Urine, Clean Catch   Final    Color, UA 10/02/2019 Johanne  Yellow, Straw, Johanne Final    Appearance, UA 10/02/2019 Hazy* Clear Final    pH, UA 10/02/2019 6.0  5.0 - 8.0 Final    Specific [place], UA 10/02/2019 1.030  1.005 - 1.030 Final    Protein, UA 10/02/2019 Negative  Negative Final    Glucose, UA 10/02/2019 Negative  Negative Final    Ketones, UA 10/02/2019 Negative  Negative Final    Bilirubin (UA) 10/02/2019 1+* Negative Final    Occult Blood UA 10/02/2019 Negative  Negative Final    Nitrite, UA 10/02/2019 Negative  Negative Final    Leukocytes, UA 10/02/2019 Negative  Negative Final    Sodium 10/02/2019 142  136 - 145 mmol/L Final    Potassium 10/02/2019 3.6  3.5 - 5.1 mmol/L Final    Chloride 10/02/2019 101  95 - 110 mmol/L Final    CO2 10/02/2019 32* 23 - 29 mmol/L Final    Glucose 10/02/2019 115* 70 - 110 mg/dL Final    BUN, Bld 10/02/2019 14  8 - 23 mg/dL Final    Creatinine 10/02/2019 0.8  0.5 - 1.4 mg/dL Final    Calcium 10/02/2019 9.8  8.7 - 10.5 mg/dL Final    Total Protein 10/02/2019 7.3  6.0 - 8.4 g/dL Final    Albumin 10/02/2019 3.9  3.5 - 5.2 g/dL Final    Total Bilirubin 10/02/2019 0.4  0.1 - 1.0 mg/dL Final    Alkaline Phosphatase 10/02/2019 65  55 - 135 U/L Final    AST 10/02/2019 24  10 - 40 U/L  Final    ALT 10/02/2019 25  10 - 44 U/L Final    Anion Gap 10/02/2019 9  8 - 16 mmol/L Final    eGFR if African American 10/02/2019 >60.0  >60 mL/min/1.73 m^2 Final    eGFR if non African American 10/02/2019 >60.0  >60 mL/min/1.73 m^2 Final    Cholesterol 10/02/2019 213* 120 - 199 mg/dL Final    Triglycerides 10/02/2019 274* 30 - 150 mg/dL Final    HDL 10/02/2019 43  40 - 75 mg/dL Final    LDL Cholesterol 10/02/2019 115.2  63.0 - 159.0 mg/dL Final    Hdl/Cholesterol Ratio 10/02/2019 20.2  20.0 - 50.0 % Final    Total Cholesterol/HDL Ratio 10/02/2019 5.0  2.0 - 5.0 Final    Non-HDL Cholesterol 10/02/2019 170  mg/dL Final    TSH 10/02/2019 2.085  0.400 - 4.000 uIU/mL Final    Vit D, 25-Hydroxy 10/02/2019 51  30 - 96 ng/mL Final     Assessment:       1. Fullness of supraclavicular fossa        Plan:     Appears to be a benign process.  Will ultrasound.  Avoid palpated the area herself at home.  Follow-up in 2 weeks.    Fullness of supraclavicular fossa  -     US Soft Tissue Head Neck Thyroid; Future; Expected date: 03/05/2020

## 2020-03-12 ENCOUNTER — TELEPHONE (OUTPATIENT)
Dept: RADIOLOGY | Facility: HOSPITAL | Age: 75
End: 2020-03-12

## 2020-03-13 ENCOUNTER — HOSPITAL ENCOUNTER (OUTPATIENT)
Dept: RADIOLOGY | Facility: HOSPITAL | Age: 75
Discharge: HOME | End: 2020-03-13
Attending: FAMILY MEDICINE
Payer: MEDICARE

## 2020-03-13 DIAGNOSIS — R22.2 FULLNESS OF SUPRACLAVICULAR FOSSA: ICD-10-CM

## 2020-03-13 PROCEDURE — 76536 US EXAM OF HEAD AND NECK: CPT | Mod: 26,,, | Performed by: RADIOLOGY

## 2020-03-13 PROCEDURE — 76536 US SOFT TISSUE HEAD NECK THYROID: ICD-10-PCS | Mod: 26,,, | Performed by: RADIOLOGY

## 2020-03-13 PROCEDURE — 76536 US EXAM OF HEAD AND NECK: CPT | Mod: TC

## 2020-03-16 ENCOUNTER — PATIENT MESSAGE (OUTPATIENT)
Dept: INTERNAL MEDICINE | Facility: CLINIC | Age: 75
End: 2020-03-16

## 2020-03-31 RX ORDER — PROPRANOLOL HYDROCHLORIDE 60 MG/1
CAPSULE, EXTENDED RELEASE ORAL
Qty: 90 CAPSULE | Refills: 1 | Status: SHIPPED | OUTPATIENT
Start: 2020-03-31 | End: 2020-09-14

## 2020-05-08 DIAGNOSIS — R60.9 EDEMA, UNSPECIFIED TYPE: ICD-10-CM

## 2020-05-08 RX ORDER — HYDROCHLOROTHIAZIDE 25 MG/1
TABLET ORAL
Qty: 90 TABLET | Refills: 1 | Status: SHIPPED | OUTPATIENT
Start: 2020-05-08 | End: 2020-11-30

## 2020-07-23 ENCOUNTER — OFFICE VISIT (OUTPATIENT)
Dept: OTOLARYNGOLOGY | Facility: CLINIC | Age: 75
End: 2020-07-23
Payer: MEDICARE

## 2020-07-23 VITALS
HEART RATE: 74 BPM | SYSTOLIC BLOOD PRESSURE: 129 MMHG | DIASTOLIC BLOOD PRESSURE: 81 MMHG | TEMPERATURE: 98 F | BODY MASS INDEX: 29.75 KG/M2 | WEIGHT: 184.31 LBS

## 2020-07-23 DIAGNOSIS — H90.3 SENSORINEURAL HEARING LOSS (SNHL) OF BOTH EARS: ICD-10-CM

## 2020-07-23 DIAGNOSIS — H93.13 TINNITUS OF BOTH EARS: Primary | ICD-10-CM

## 2020-07-23 DIAGNOSIS — R42 DIZZINESS: ICD-10-CM

## 2020-07-23 DIAGNOSIS — R09.81 NASAL CONGESTION: ICD-10-CM

## 2020-07-23 PROCEDURE — 99214 PR OFFICE/OUTPT VISIT, EST, LEVL IV, 30-39 MIN: ICD-10-PCS | Mod: S$PBB,,, | Performed by: PHYSICIAN ASSISTANT

## 2020-07-23 PROCEDURE — 99999 PR PBB SHADOW E&M-EST. PATIENT-LVL III: CPT | Mod: PBBFAC,,, | Performed by: PHYSICIAN ASSISTANT

## 2020-07-23 PROCEDURE — 99999 PR PBB SHADOW E&M-EST. PATIENT-LVL III: ICD-10-PCS | Mod: PBBFAC,,, | Performed by: PHYSICIAN ASSISTANT

## 2020-07-23 PROCEDURE — 99214 OFFICE O/P EST MOD 30 MIN: CPT | Mod: S$PBB,,, | Performed by: PHYSICIAN ASSISTANT

## 2020-07-23 PROCEDURE — 99213 OFFICE O/P EST LOW 20 MIN: CPT | Mod: PBBFAC | Performed by: PHYSICIAN ASSISTANT

## 2020-07-23 RX ORDER — FLUTICASONE PROPIONATE 50 MCG
2 SPRAY, SUSPENSION (ML) NASAL DAILY
Qty: 16 G | Refills: 11 | Status: SHIPPED | OUTPATIENT
Start: 2020-07-23 | End: 2020-09-27 | Stop reason: SDUPTHER

## 2020-07-23 NOTE — PROGRESS NOTES
Subjective:       Patient ID: Teresa Orozco is a 74 y.o. female.    Chief Complaint: Tinnitus, Ear Fullness, Dizziness, Nasal tissue swelling, and Post nasal drip    Patient is a very pleasant 74 y.o. female here to see me today for multiple issues.  She has known SNHL and tinnitus AU at baseline.  Says over past 6-8 weeks, her tinnitus AU is much louder and lasts all day.  Does not interfere with sleep; denies any recent changes in her hearing.  She denies ear pain or drainage.  She describes it as loud cicadas.  She has some pressure in both ears but says it feels different than her usual wax.  She also reports nasal congestion, worse when she first wakes in AM.  At times she has postnasal drip and sometimes coughs up clear to white mucus.  No significant nasal drainage; denies sinus pain or pressure.  She is not using any nasal sprays currently (Nasacort in past).  No fever.  She has had intentional 20# weight loss in past 6 months and says her skin and nails are dry which she relates to possible dehydration with her dietary changes.  She has issues with dizziness and describes occasional unsteadiness when walking or veering off center.  She had brief spinning in bed about 2-3 months ago.  She has had dizziness before with blood pressure fluctuations.        Review of Systems   Constitutional: Negative for activity change, appetite change, fever and unexpected weight change.   HENT: Positive for nasal congestion, hearing loss (known, no recent change), postnasal drip, sneezing (with eating at times) and tinnitus. Negative for ear discharge, ear pain, nosebleeds, rhinorrhea, sinus pressure/congestion and sore throat.    Respiratory: Negative for cough and shortness of breath.    Gastrointestinal: Negative for diarrhea, nausea and vomiting.   Musculoskeletal: Negative for neck pain.   Allergic/Immunologic: Positive for food allergies.   Neurological: Positive for dizziness and light-headedness. Negative for  headaches.   Hematological: Negative for adenopathy.         Objective:      Physical Exam  Vitals signs reviewed.   Constitutional:       General: She is not in acute distress.     Appearance: She is well-developed.   HENT:      Head: Normocephalic and atraumatic.      Right Ear: Ear canal and external ear normal. No middle ear effusion. There is no impacted cerumen. Tympanic membrane is not erythematous or retracted.      Left Ear: Tympanic membrane, ear canal and external ear normal.  No middle ear effusion. There is no impacted cerumen. Tympanic membrane is not erythematous or retracted.      Nose: Mucosal edema present. No nasal deformity, septal deviation or rhinorrhea.      Right Nostril: No epistaxis.      Left Nostril: No epistaxis.      Right Turbinates: Enlarged and swollen.      Left Turbinates: Not enlarged (able to see left middle turbinate).      Right Sinus: No maxillary sinus tenderness or frontal sinus tenderness.      Left Sinus: No maxillary sinus tenderness or frontal sinus tenderness.      Mouth/Throat:      Mouth: Mucous membranes are not pale and not dry.      Dentition: Normal dentition.      Pharynx: Uvula midline. No oropharyngeal exudate or posterior oropharyngeal erythema.   Eyes:      General: Lids are normal. No scleral icterus.     Extraocular Movements:      Right eye: Normal extraocular motion and no nystagmus.      Left eye: Normal extraocular motion and no nystagmus.      Conjunctiva/sclera: Conjunctivae normal.      Right eye: Right conjunctiva is not injected. No chemosis.     Left eye: Left conjunctiva is not injected. No chemosis.     Pupils: Pupils are equal, round, and reactive to light.   Neck:      Thyroid: No thyroid mass or thyromegaly.      Trachea: Trachea and phonation normal. No tracheal tenderness or tracheal deviation.   Pulmonary:      Effort: Pulmonary effort is normal. No respiratory distress.      Breath sounds: No stridor.   Abdominal:      General: There is  no distension.   Lymphadenopathy:      Head:      Right side of head: No submental or submandibular adenopathy.      Left side of head: No submental or submandibular adenopathy.      Cervical: No cervical adenopathy.   Skin:     General: Skin is warm and dry.      Findings: No erythema or rash.   Neurological:      Mental Status: She is alert and oriented to person, place, and time.      Cranial Nerves: No cranial nerve deficit.   Psychiatric:         Behavior: Behavior normal. Behavior is cooperative.         Assessment:       1. Tinnitus of both ears    2. Sensorineural hearing loss (SNHL) of both ears    3. Dizziness    4. Nasal congestion        Plan:         Discussed her last audiogram from 5/2019 in detail.  We also discussed that tinnitus is most often caused by a hearing loss, and that as the hair cells are damaged, either genetic or as a result of loud noise exposure, they then cause tinnitus.  Some patients find that restricting the salt or caffeine in their diet helps, and there is also an OTC supplement, lipoflavinoids, that some people find to be effective though their benefit is not fully proven.  Tinnitus tends to be louder in times of stress and fatigue, and may decrease with time.  She notes increased stress recently with neighbor's tree falling in their yard.  Sound machines may also be an effective masking technique if needed at night.    I had a long discussion with the patient regarding their symptoms.  Dizziness, vertigo and disequilibrium are common symptoms reported by adults during visits to their doctors. They are all symptoms that can result from a peripheral vestibular disorder (a dysfunction of the balance organs of the inner ear) or central vestibular disorder (a dysfunction of one or more parts of the central nervous system that help process balance and spatial information).  There are also non-vestibular causes of dizziness, and dizziness can be linked to a wide array of problems  such as blood-flow irregularities from cardiovascular problems and blood pressure fluctuations.  I would recommend a VNG with audiogram for further diagnostic testing, and will contact the patient with further recommendations when that is complete.  She wishes to wait and call back to schedule when motivated.    We had a long discussion regarding the anatomy and function of the eustachian tube.  We discussed that the eustachian tube acts as a pump to keep the appropriate amount of pressure behind the ear drum.  I gave the patient a prescription for a nasal steroid spray to be used on a daily basis, and we discussed that it will take 2-3 weeks of daily use to achieve maximal effectiveness.

## 2020-09-10 ENCOUNTER — TELEPHONE (OUTPATIENT)
Dept: INTERNAL MEDICINE | Facility: CLINIC | Age: 75
End: 2020-09-10

## 2020-09-10 DIAGNOSIS — G43.909 MIGRAINE WITHOUT STATUS MIGRAINOSUS, NOT INTRACTABLE, UNSPECIFIED MIGRAINE TYPE: ICD-10-CM

## 2020-09-10 DIAGNOSIS — E78.5 HYPERLIPIDEMIA, UNSPECIFIED HYPERLIPIDEMIA TYPE: Primary | ICD-10-CM

## 2020-09-10 DIAGNOSIS — E55.9 VITAMIN D DEFICIENCY DISEASE: ICD-10-CM

## 2020-09-10 NOTE — TELEPHONE ENCOUNTER
----- Message from Sissy Walton sent at 9/10/2020  9:39 AM CDT -----  Contact: pt  Type:  Same Day Appointment Request    Caller is requesting a same day appointment.  Caller declined first available appointment listed below.    Name of Caller: pt   When is the first available appointment?09/14/20  Symptoms: issues with ear   Best Call Back Number: [phone]  Additional Information:

## 2020-09-10 NOTE — TELEPHONE ENCOUNTER
Pt would like to have her annual labs done today, please place orders, she will make an apt for next week.

## 2020-09-11 ENCOUNTER — LAB VISIT (OUTPATIENT)
Dept: LAB | Facility: HOSPITAL | Age: 75
End: 2020-09-11
Attending: FAMILY MEDICINE
Payer: MEDICARE

## 2020-09-11 DIAGNOSIS — E78.5 HYPERLIPIDEMIA, UNSPECIFIED HYPERLIPIDEMIA TYPE: ICD-10-CM

## 2020-09-11 DIAGNOSIS — E55.9 VITAMIN D DEFICIENCY DISEASE: ICD-10-CM

## 2020-09-11 DIAGNOSIS — G43.909 MIGRAINE WITHOUT STATUS MIGRAINOSUS, NOT INTRACTABLE, UNSPECIFIED MIGRAINE TYPE: ICD-10-CM

## 2020-09-11 LAB
ALBUMIN SERPL BCP-MCNC: 3.7 G/DL (ref 3.5–5.2)
ALP SERPL-CCNC: 62 U/L (ref 55–135)
ALT SERPL W/O P-5'-P-CCNC: 15 U/L (ref 10–44)
ANION GAP SERPL CALC-SCNC: 9 MMOL/L (ref 8–16)
AST SERPL-CCNC: 17 U/L (ref 10–40)
BASOPHILS # BLD AUTO: 0.04 K/UL (ref 0–0.2)
BASOPHILS NFR BLD: 0.5 % (ref 0–1.9)
BILIRUB SERPL-MCNC: 0.4 MG/DL (ref 0.1–1)
BUN SERPL-MCNC: 11 MG/DL (ref 8–23)
CALCIUM SERPL-MCNC: 9.3 MG/DL (ref 8.7–10.5)
CHLORIDE SERPL-SCNC: 102 MMOL/L (ref 95–110)
CHOLEST SERPL-MCNC: 222 MG/DL (ref 120–199)
CHOLEST/HDLC SERPL: 5.2 {RATIO} (ref 2–5)
CO2 SERPL-SCNC: 29 MMOL/L (ref 23–29)
CREAT SERPL-MCNC: 0.8 MG/DL (ref 0.5–1.4)
DIFFERENTIAL METHOD: ABNORMAL
EOSINOPHIL # BLD AUTO: 0.2 K/UL (ref 0–0.5)
EOSINOPHIL NFR BLD: 2.2 % (ref 0–8)
ERYTHROCYTE [DISTWIDTH] IN BLOOD BY AUTOMATED COUNT: 13 % (ref 11.5–14.5)
EST. GFR  (AFRICAN AMERICAN): >60 ML/MIN/1.73 M^2
EST. GFR  (NON AFRICAN AMERICAN): >60 ML/MIN/1.73 M^2
GLUCOSE SERPL-MCNC: 101 MG/DL (ref 70–110)
HCT VFR BLD AUTO: 42.9 % (ref 37–48.5)
HDLC SERPL-MCNC: 43 MG/DL (ref 40–75)
HDLC SERPL: 19.4 % (ref 20–50)
HGB BLD-MCNC: 13.6 G/DL (ref 12–16)
IMM GRANULOCYTES # BLD AUTO: 0.03 K/UL (ref 0–0.04)
IMM GRANULOCYTES NFR BLD AUTO: 0.4 % (ref 0–0.5)
LDLC SERPL CALC-MCNC: 128 MG/DL (ref 63–159)
LYMPHOCYTES # BLD AUTO: 2.3 K/UL (ref 1–4.8)
LYMPHOCYTES NFR BLD: 30 % (ref 18–48)
MCH RBC QN AUTO: 29.5 PG (ref 27–31)
MCHC RBC AUTO-ENTMCNC: 31.7 G/DL (ref 32–36)
MCV RBC AUTO: 93 FL (ref 82–98)
MONOCYTES # BLD AUTO: 0.6 K/UL (ref 0.3–1)
MONOCYTES NFR BLD: 8.1 % (ref 4–15)
NEUTROPHILS # BLD AUTO: 4.6 K/UL (ref 1.8–7.7)
NEUTROPHILS NFR BLD: 58.8 % (ref 38–73)
NONHDLC SERPL-MCNC: 179 MG/DL
NRBC BLD-RTO: 0 /100 WBC
PLATELET # BLD AUTO: 299 K/UL (ref 150–350)
PMV BLD AUTO: 10.6 FL (ref 9.2–12.9)
POTASSIUM SERPL-SCNC: 3.8 MMOL/L (ref 3.5–5.1)
PROT SERPL-MCNC: 6.6 G/DL (ref 6–8.4)
RBC # BLD AUTO: 4.61 M/UL (ref 4–5.4)
SODIUM SERPL-SCNC: 140 MMOL/L (ref 136–145)
TRIGL SERPL-MCNC: 255 MG/DL (ref 30–150)
WBC # BLD AUTO: 7.8 K/UL (ref 3.9–12.7)

## 2020-09-11 PROCEDURE — 82306 VITAMIN D 25 HYDROXY: CPT

## 2020-09-11 PROCEDURE — 80061 LIPID PANEL: CPT

## 2020-09-11 PROCEDURE — 80053 COMPREHEN METABOLIC PANEL: CPT

## 2020-09-11 PROCEDURE — 85025 COMPLETE CBC W/AUTO DIFF WBC: CPT

## 2020-09-11 PROCEDURE — 36415 COLL VENOUS BLD VENIPUNCTURE: CPT

## 2020-09-12 LAB — 25(OH)D3+25(OH)D2 SERPL-MCNC: 45 NG/ML (ref 30–96)

## 2020-09-14 ENCOUNTER — OFFICE VISIT (OUTPATIENT)
Dept: INTERNAL MEDICINE | Facility: CLINIC | Age: 75
End: 2020-09-14
Payer: MEDICARE

## 2020-09-14 VITALS
TEMPERATURE: 99 F | WEIGHT: 179.88 LBS | HEIGHT: 66 IN | DIASTOLIC BLOOD PRESSURE: 78 MMHG | OXYGEN SATURATION: 96 % | HEART RATE: 90 BPM | BODY MASS INDEX: 28.91 KG/M2 | SYSTOLIC BLOOD PRESSURE: 118 MMHG

## 2020-09-14 DIAGNOSIS — Z17.0 BILATERAL MALIGNANT NEOPLASM OF BREAST IN FEMALE, ESTROGEN RECEPTOR POSITIVE, UNSPECIFIED SITE OF BREAST: ICD-10-CM

## 2020-09-14 DIAGNOSIS — E55.9 VITAMIN D DEFICIENCY DISEASE: ICD-10-CM

## 2020-09-14 DIAGNOSIS — C50.912 BILATERAL MALIGNANT NEOPLASM OF BREAST IN FEMALE, ESTROGEN RECEPTOR POSITIVE, UNSPECIFIED SITE OF BREAST: ICD-10-CM

## 2020-09-14 DIAGNOSIS — H91.91 HEARING LOSS OF RIGHT EAR, UNSPECIFIED HEARING LOSS TYPE: ICD-10-CM

## 2020-09-14 DIAGNOSIS — Z86.69 HISTORY OF MIGRAINE HEADACHES: ICD-10-CM

## 2020-09-14 DIAGNOSIS — M47.892 OTHER SPONDYLOSIS, CERVICAL REGION: ICD-10-CM

## 2020-09-14 DIAGNOSIS — Z00.00 ANNUAL PHYSICAL EXAM: Primary | ICD-10-CM

## 2020-09-14 DIAGNOSIS — C50.911 BILATERAL MALIGNANT NEOPLASM OF BREAST IN FEMALE, ESTROGEN RECEPTOR POSITIVE, UNSPECIFIED SITE OF BREAST: ICD-10-CM

## 2020-09-14 DIAGNOSIS — E78.5 HYPERLIPIDEMIA, UNSPECIFIED HYPERLIPIDEMIA TYPE: ICD-10-CM

## 2020-09-14 PROCEDURE — 99214 PR OFFICE/OUTPT VISIT, EST, LEVL IV, 30-39 MIN: ICD-10-PCS | Mod: S$PBB,,, | Performed by: FAMILY MEDICINE

## 2020-09-14 PROCEDURE — 99214 OFFICE O/P EST MOD 30 MIN: CPT | Mod: PBBFAC | Performed by: FAMILY MEDICINE

## 2020-09-14 PROCEDURE — 99999 PR PBB SHADOW E&M-EST. PATIENT-LVL IV: CPT | Mod: PBBFAC,,, | Performed by: FAMILY MEDICINE

## 2020-09-14 PROCEDURE — 99999 PR PBB SHADOW E&M-EST. PATIENT-LVL IV: ICD-10-PCS | Mod: PBBFAC,,, | Performed by: FAMILY MEDICINE

## 2020-09-14 PROCEDURE — 99214 OFFICE O/P EST MOD 30 MIN: CPT | Mod: S$PBB,,, | Performed by: FAMILY MEDICINE

## 2020-09-14 RX ORDER — PROPRANOLOL HYDROCHLORIDE 120 MG/1
120 CAPSULE, EXTENDED RELEASE ORAL DAILY
Qty: 90 CAPSULE | Refills: 3 | Status: SHIPPED | OUTPATIENT
Start: 2020-09-14 | End: 2021-07-19

## 2020-09-14 NOTE — PROGRESS NOTES
Subjective:       Patient ID: Teresa Orozco is a 75 y.o. female.    Chief Complaint: Annual Exam    Annual exam    Review of Systems   Constitutional: Negative for activity change, appetite change, chills, fatigue, fever and unexpected weight change.   HENT: Positive for congestion, ear pain and hearing loss. Negative for dental problem, sneezing, sore throat, tinnitus and trouble swallowing.    Eyes: Negative for pain, redness, itching and visual disturbance.   Respiratory: Negative for cough, chest tightness, shortness of breath and wheezing.    Cardiovascular: Negative for chest pain, palpitations and leg swelling.   Gastrointestinal: Negative for abdominal distention, abdominal pain, blood in stool, constipation, diarrhea, nausea and vomiting.   Genitourinary: Negative for difficulty urinating, dysuria, frequency, hematuria and urgency.   Musculoskeletal: Positive for neck pain and neck stiffness. Negative for arthralgias, back pain and joint swelling.   Skin: Negative for rash and wound.   Neurological: Positive for dizziness and headaches. Negative for tremors, syncope, weakness, light-headedness and numbness.   Hematological: Negative for adenopathy. Does not bruise/bleed easily.   Psychiatric/Behavioral: Negative for agitation, dysphoric mood and sleep disturbance. The patient is not nervous/anxious.        Objective:      Physical Exam  Constitutional:       General: She is in acute distress.      Appearance: Normal appearance. She is not ill-appearing or diaphoretic.   HENT:      Right Ear: Tympanic membrane normal.      Left Ear: Tympanic membrane normal.      Nose: Nose normal.   Eyes:      Conjunctiva/sclera: Conjunctivae normal.      Pupils: Pupils are equal, round, and reactive to light.   Neck:      Musculoskeletal: Muscular tenderness present. No neck rigidity.      Vascular: No carotid bruit.   Cardiovascular:      Rate and Rhythm: Normal rate and regular rhythm.      Heart sounds: No murmur. No  gallop.    Pulmonary:      Effort: Pulmonary effort is normal. No respiratory distress.      Breath sounds: No wheezing, rhonchi or rales.   Abdominal:      General: Bowel sounds are normal. There is no distension.      Palpations: There is no mass.      Tenderness: There is no abdominal tenderness.   Musculoskeletal:         General: Tenderness present.      Comments: Post neck mark rt side   Lymphadenopathy:      Cervical: No cervical adenopathy.   Neurological:      Mental Status: She is alert and oriented to person, place, and time.   Psychiatric:         Mood and Affect: Mood normal.         Thought Content: Thought content normal.         Judgment: Judgment normal.         Lab Visit on 09/11/2020   Component Date Value Ref Range Status    Specimen UA 09/11/2020 Urine, Clean Catch   Final    Color, UA 09/11/2020 Yellow  Yellow, Straw, Johanne Final    Appearance, UA 09/11/2020 Hazy* Clear Final    pH, UA 09/11/2020 6.0  5.0 - 8.0 Final    Specific [place], UA 09/11/2020 1.020  1.005 - 1.030 Final    Protein, UA 09/11/2020 Negative  Negative Final    Glucose, UA 09/11/2020 Negative  Negative Final    Ketones, UA 09/11/2020 Negative  Negative Final    Bilirubin (UA) 09/11/2020 Negative  Negative Final    Occult Blood UA 09/11/2020 Negative  Negative Final    Nitrite, UA 09/11/2020 Negative  Negative Final    Leukocytes, UA 09/11/2020 Negative  Negative Final     Assessment:       1. Other spondylosis, cervical region        Plan:     ent followup for hearing loss and dizziness  Chronic neck pain worse sev mos  MRI cspine  Increased migraines  Resume inderal 60 to 120 mg a day lab noted bp noted ov 1mo breast cancer followed by oncology    Other spondylosis, cervical region  -     MRI Cervical Spine Without Contrast; Future; Expected date: 09/14/2020    Other orders  -     propranoloL (INDERAL LA) 120 MG 24 hr capsule; Take 1 capsule (120 mg total) by mouth once daily.  Dispense: 90 capsule; Refill:  3

## 2020-09-15 ENCOUNTER — TELEPHONE (OUTPATIENT)
Dept: AUDIOLOGY | Facility: CLINIC | Age: 75
End: 2020-09-15

## 2020-09-15 NOTE — TELEPHONE ENCOUNTER
A/VNG scheduled for 9/24 3:30. Instructions provided over the phone /rt/  ----- Message from Tez Moreau sent at 9/15/2020  8:49 AM CDT -----  Pt would like to schedule audiogram and VNG.   Please call back at [phone]. Md Jose Armando

## 2020-09-21 ENCOUNTER — PATIENT MESSAGE (OUTPATIENT)
Dept: AUDIOLOGY | Facility: CLINIC | Age: 75
End: 2020-09-21

## 2020-09-23 ENCOUNTER — HOSPITAL ENCOUNTER (OUTPATIENT)
Dept: RADIOLOGY | Facility: HOSPITAL | Age: 75
Discharge: HOME | End: 2020-09-23
Attending: FAMILY MEDICINE
Payer: MEDICARE

## 2020-09-23 DIAGNOSIS — M47.892 OTHER SPONDYLOSIS, CERVICAL REGION: ICD-10-CM

## 2020-09-23 PROCEDURE — 72141 MRI NECK SPINE W/O DYE: CPT | Mod: TC

## 2020-09-28 RX ORDER — FLUTICASONE PROPIONATE 50 MCG
2 SPRAY, SUSPENSION (ML) NASAL DAILY
Qty: 16 G | Refills: 11 | Status: SHIPPED | OUTPATIENT
Start: 2020-09-28 | End: 2020-10-01 | Stop reason: SDUPTHER

## 2020-09-28 NOTE — TELEPHONE ENCOUNTER
Spoke with the patient and let her know that her prescription has been sent in to her pharmacy on file.       Please see the attached refill request.

## 2020-09-30 ENCOUNTER — CLINICAL SUPPORT (OUTPATIENT)
Dept: AUDIOLOGY | Facility: CLINIC | Age: 75
End: 2020-09-30
Payer: MEDICARE

## 2020-09-30 ENCOUNTER — PATIENT MESSAGE (OUTPATIENT)
Dept: OTOLARYNGOLOGY | Facility: CLINIC | Age: 75
End: 2020-09-30

## 2020-09-30 DIAGNOSIS — H90.3 SENSORINEURAL HEARING LOSS, BILATERAL: Primary | ICD-10-CM

## 2020-09-30 DIAGNOSIS — H81.12 BPPV (BENIGN PAROXYSMAL POSITIONAL VERTIGO), LEFT: ICD-10-CM

## 2020-09-30 DIAGNOSIS — H93.13 TINNITUS, BILATERAL: ICD-10-CM

## 2020-09-30 PROCEDURE — 92540 BASIC VESTIBULAR EVALUATION: CPT | Mod: PBBFAC | Performed by: AUDIOLOGIST-HEARING AID FITTER

## 2020-09-30 PROCEDURE — 92540 BASIC VESTIBULAR EVALUATION: CPT | Mod: 26,S$PBB,, | Performed by: AUDIOLOGIST-HEARING AID FITTER

## 2020-09-30 PROCEDURE — 92540 PR VESTIBULAR EVAL NYSTAG FOVL&PERPH STIM OSCIL TRACKING: ICD-10-PCS | Mod: 26,S$PBB,, | Performed by: AUDIOLOGIST-HEARING AID FITTER

## 2020-09-30 PROCEDURE — 92567 TYMPANOMETRY: CPT | Mod: PBBFAC | Performed by: AUDIOLOGIST-HEARING AID FITTER

## 2020-09-30 PROCEDURE — 92557 COMPREHENSIVE HEARING TEST: CPT | Mod: PBBFAC | Performed by: AUDIOLOGIST-HEARING AID FITTER

## 2020-09-30 NOTE — PROGRESS NOTES
Referring provider: Genet Moody PA-C    Teresa Orozco was seen 09/30/2020 for an audiological and vestibular evaluation.  Patient complains of dizziness that started several months ago. She reports whirling when lying, turning or arising from bed sometimes. She also was having missteps and unsteadiness, feeling like she veers to the side when walking. Symptoms come and go. She also complains of fullness in both ears at times, right worse than left. She says her hearing will go out some days for hours in either ear but mostly her right ear will lose complete hearing for several hours and then return to normal. She has tinnitus in the bilateral ear for many years. A few months ago her tinnitus got louder but has now settled back to baseline. She admits to increased stress at that time as well. She has history of ocular migraine and has concern for vestibular migraine. Has family history of hearing loss (father with age, mother had Meniere's).     Audiology Report:  Results reveal a borderline normal-to-moderate sensorineural hearing loss 125-8000 Hz for the right ear, and a mild-to-moderate sensorineural hearing loss 250-8000 Hz for the left ear.   Speech Reception Thresholds were 30 dBHL for the right ear and 30 dBHL for the left ear.   Word recognition scores were excellent for the right ear and excellent for the left ear.   Tympanograms were Type As for the right ear and Type As for the left ear.      Videonystagmography Report (VNG):  Oculomotor function tests (sinusoidal tracking, saccade, optokinetic) were normal and symmetric.  Gaze test was absent for nystagmus.  Spontaneous test was absent for nystagmus.  Head-shake test could not be tested due to neck pain.  Static Positional test revealed nystagmus to head left:   Head Center: Absent   Head Right: Absent   Head Left: 4 d/s LB (+) fixation suppression.  Nestor-Hallpike Right was negative for BPPV.  Cadogan-Hallpike Left was positiive for BPPV. Delayed onset  transient upward and leftward nystagmus (SPV: 5 d/s UB + 4 d/s RB).  Calorics were not tested today so that patient may tolerate CRM.    Summary:  BPPV, left.     A 5-position Epley maneuver was performed for the left ear.  Nystagmus was observed to HHL and upon turning to HHR with transient dizziness. Patient tolerated the maneuver well and was asymptomatic upon discharge.  Post maneuver instructions were given to the patient both verbally and written.  Understanding was voiced.    Recommendations:  1. Return in one week to recheck BPPV and complete calorics.   2. Patient is a candidate for hearing aids, when ready.   3. Annual audiogram to monitor hearing loss. Also, discussed returning for audiogram if possible when she has the changes in her hearing.       Patient was counseled on the above findings.  Tracings are to be scanned.

## 2020-10-01 RX ORDER — FLUTICASONE PROPIONATE 50 MCG
2 SPRAY, SUSPENSION (ML) NASAL DAILY
Qty: 48 G | Refills: 3 | Status: SHIPPED | OUTPATIENT
Start: 2020-10-01 | End: 2021-10-28

## 2020-10-14 ENCOUNTER — OFFICE VISIT (OUTPATIENT)
Dept: INTERNAL MEDICINE | Facility: CLINIC | Age: 75
End: 2020-10-14
Payer: MEDICARE

## 2020-10-14 VITALS
WEIGHT: 180.31 LBS | SYSTOLIC BLOOD PRESSURE: 116 MMHG | BODY MASS INDEX: 28.98 KG/M2 | OXYGEN SATURATION: 96 % | HEIGHT: 66 IN | TEMPERATURE: 99 F | HEART RATE: 78 BPM | DIASTOLIC BLOOD PRESSURE: 84 MMHG

## 2020-10-14 DIAGNOSIS — G43.909 MIGRAINE WITHOUT STATUS MIGRAINOSUS, NOT INTRACTABLE, UNSPECIFIED MIGRAINE TYPE: ICD-10-CM

## 2020-10-14 DIAGNOSIS — M50.022 CERVICAL DISC DISORDER AT C5-C6 LEVEL WITH MYELOPATHY: Primary | ICD-10-CM

## 2020-10-14 PROCEDURE — 99214 OFFICE O/P EST MOD 30 MIN: CPT | Mod: PBBFAC,25 | Performed by: FAMILY MEDICINE

## 2020-10-14 PROCEDURE — 90694 VACC AIIV4 NO PRSRV 0.5ML IM: CPT | Mod: PBBFAC

## 2020-10-14 PROCEDURE — 99213 PR OFFICE/OUTPT VISIT, EST, LEVL III, 20-29 MIN: ICD-10-PCS | Mod: S$PBB,,, | Performed by: FAMILY MEDICINE

## 2020-10-14 PROCEDURE — 99999 PR PBB SHADOW E&M-EST. PATIENT-LVL IV: ICD-10-PCS | Mod: PBBFAC,,, | Performed by: FAMILY MEDICINE

## 2020-10-14 PROCEDURE — 99213 OFFICE O/P EST LOW 20 MIN: CPT | Mod: S$PBB,,, | Performed by: FAMILY MEDICINE

## 2020-10-14 PROCEDURE — 99999 PR PBB SHADOW E&M-EST. PATIENT-LVL IV: CPT | Mod: PBBFAC,,, | Performed by: FAMILY MEDICINE

## 2020-10-14 PROCEDURE — G0008 ADMIN INFLUENZA VIRUS VAC: HCPCS | Mod: PBBFAC

## 2020-10-14 NOTE — PROGRESS NOTES
Subjective:       Patient ID: Teresa Orozco is a 75 y.o. female.    Chief Complaint: Follow-up    Migraine headaches have resolved after propranolol increased 120 mg.  She has ongoing neck pain worse with sleep in certain positions with some headache and with a sense of imbalance.  ENT evaluation recently done.  She reports she had some crystals that were adjusted and has improved his symptoms.    Review of Systems   Constitutional: Negative for chills and fever.   Respiratory: Negative for cough and shortness of breath.    Cardiovascular: Negative for chest pain, palpitations and leg swelling.   Musculoskeletal: Positive for neck pain and neck stiffness.   Neurological: Positive for headaches. Negative for weakness, light-headedness and numbness.       Objective:      Physical Exam  Constitutional:       General: She is not in acute distress.     Appearance: Normal appearance. She is not diaphoretic.   Cardiovascular:      Rate and Rhythm: Normal rate and regular rhythm.   Pulmonary:      Effort: Pulmonary effort is normal.      Breath sounds: Normal breath sounds.   Skin:     General: Skin is warm and dry.   Neurological:      Mental Status: She is alert.   Psychiatric:         Mood and Affect: Mood normal.         Behavior: Behavior normal.         Thought Content: Thought content normal.         Judgment: Judgment normal.         Lab Visit on 09/11/2020   Component Date Value Ref Range Status    Specimen UA 09/11/2020 Urine, Clean Catch   Final    Color, UA 09/11/2020 Yellow  Yellow, Straw, Johanne Final    Appearance, UA 09/11/2020 Hazy* Clear Final    pH, UA 09/11/2020 6.0  5.0 - 8.0 Final    Specific [place], UA 09/11/2020 1.020  1.005 - 1.030 Final    Protein, UA 09/11/2020 Negative  Negative Final    Glucose, UA 09/11/2020 Negative  Negative Final    Ketones, UA 09/11/2020 Negative  Negative Final    Bilirubin (UA) 09/11/2020 Negative  Negative Final    Occult Blood UA 09/11/2020 Negative  Negative  Final    Nitrite, UA 09/11/2020 Negative  Negative Final    Leukocytes, UA 09/11/2020 Negative  Negative Final     Assessment:       1. Cervical disc disorder at C5-C6 level with myelopathy        Plan:     Flu VAX given today.  Neurology referral and possible neurosurgical referral for evaluation of C5-6 disc protrusion follow-up in 3 months    Cervical disc disorder at C5-C6 level with myelopathy  -     Ambulatory referral/consult to Neurology; Future; Expected date: 10/21/2020    Other orders  -     Influenza (FLUAD) - Quadrivalent (Adjuvanted) *Preferred* (65+) (PF)

## 2020-12-13 ENCOUNTER — PATIENT MESSAGE (OUTPATIENT)
Dept: INTERNAL MEDICINE | Facility: CLINIC | Age: 75
End: 2020-12-13

## 2020-12-14 PROBLEM — Z00.00 ANNUAL PHYSICAL EXAM: Status: RESOLVED | Noted: 2017-06-12 | Resolved: 2020-12-14

## 2020-12-30 ENCOUNTER — PES CALL (OUTPATIENT)
Dept: ADMINISTRATIVE | Facility: CLINIC | Age: 75
End: 2020-12-30

## 2021-02-02 ENCOUNTER — PES CALL (OUTPATIENT)
Dept: ADMINISTRATIVE | Facility: CLINIC | Age: 76
End: 2021-02-02

## 2021-02-12 ENCOUNTER — TELEPHONE (OUTPATIENT)
Dept: NEUROLOGY | Facility: CLINIC | Age: 76
End: 2021-02-12

## 2021-02-22 ENCOUNTER — HOSPITAL ENCOUNTER (OUTPATIENT)
Dept: RADIOLOGY | Facility: HOSPITAL | Age: 76
Discharge: HOME | End: 2021-02-22
Attending: FAMILY MEDICINE
Payer: MEDICARE

## 2021-02-22 ENCOUNTER — OFFICE VISIT (OUTPATIENT)
Dept: INTERNAL MEDICINE | Facility: CLINIC | Age: 76
End: 2021-02-22
Payer: MEDICARE

## 2021-02-22 VITALS
DIASTOLIC BLOOD PRESSURE: 60 MMHG | HEART RATE: 70 BPM | TEMPERATURE: 98 F | SYSTOLIC BLOOD PRESSURE: 120 MMHG | WEIGHT: 179.44 LBS | OXYGEN SATURATION: 96 % | BODY MASS INDEX: 28.84 KG/M2 | HEIGHT: 66 IN

## 2021-02-22 DIAGNOSIS — M50.022 CERVICAL DISC DISORDER AT C5-C6 LEVEL WITH MYELOPATHY: ICD-10-CM

## 2021-02-22 DIAGNOSIS — F41.9 ANXIETY: ICD-10-CM

## 2021-02-22 DIAGNOSIS — G43.909 MIGRAINE WITHOUT STATUS MIGRAINOSUS, NOT INTRACTABLE, UNSPECIFIED MIGRAINE TYPE: ICD-10-CM

## 2021-02-22 DIAGNOSIS — M94.8X9 CHONDRITIS: ICD-10-CM

## 2021-02-22 DIAGNOSIS — R42 VERTIGO: ICD-10-CM

## 2021-02-22 DIAGNOSIS — M94.8X9 CHONDRITIS: Primary | ICD-10-CM

## 2021-02-22 PROCEDURE — 99214 PR OFFICE/OUTPT VISIT, EST, LEVL IV, 30-39 MIN: ICD-10-PCS | Mod: S$PBB,,, | Performed by: FAMILY MEDICINE

## 2021-02-22 PROCEDURE — 99214 OFFICE O/P EST MOD 30 MIN: CPT | Mod: S$PBB,,, | Performed by: FAMILY MEDICINE

## 2021-02-22 PROCEDURE — 71100 XR RIBS 2 VIEW LEFT: ICD-10-PCS | Mod: 26,LT,, | Performed by: RADIOLOGY

## 2021-02-22 PROCEDURE — 99214 OFFICE O/P EST MOD 30 MIN: CPT | Mod: PBBFAC,25 | Performed by: FAMILY MEDICINE

## 2021-02-22 PROCEDURE — 99999 PR PBB SHADOW E&M-EST. PATIENT-LVL IV: ICD-10-PCS | Mod: PBBFAC,,, | Performed by: FAMILY MEDICINE

## 2021-02-22 PROCEDURE — 71100 X-RAY EXAM RIBS UNI 2 VIEWS: CPT | Mod: 26,LT,, | Performed by: RADIOLOGY

## 2021-02-22 PROCEDURE — 71100 X-RAY EXAM RIBS UNI 2 VIEWS: CPT | Mod: TC,LT

## 2021-02-22 PROCEDURE — 99999 PR PBB SHADOW E&M-EST. PATIENT-LVL IV: CPT | Mod: PBBFAC,,, | Performed by: FAMILY MEDICINE

## 2021-02-22 RX ORDER — MUPIROCIN CALCIUM 20 MG/G
CREAM TOPICAL 3 TIMES DAILY
COMMUNITY
End: 2021-10-18 | Stop reason: SDUPTHER

## 2021-02-22 RX ORDER — MUPIROCIN 20 MG/G
OINTMENT TOPICAL 3 TIMES DAILY
Qty: 22 G | Refills: 2 | Status: SHIPPED | OUTPATIENT
Start: 2021-02-22 | End: 2022-01-18

## 2021-02-22 RX ORDER — LORAZEPAM 0.5 MG/1
0.5 TABLET ORAL EVERY 6 HOURS PRN
Qty: 30 TABLET | Refills: 0 | Status: SHIPPED | OUTPATIENT
Start: 2021-02-22 | End: 2022-01-18

## 2021-02-26 ENCOUNTER — TELEPHONE (OUTPATIENT)
Dept: INTERNAL MEDICINE | Facility: CLINIC | Age: 76
End: 2021-02-26

## 2021-03-01 ENCOUNTER — PES CALL (OUTPATIENT)
Dept: ADMINISTRATIVE | Facility: CLINIC | Age: 76
End: 2021-03-01

## 2021-03-15 ENCOUNTER — HOSPITAL ENCOUNTER (OUTPATIENT)
Dept: RADIOLOGY | Facility: HOSPITAL | Age: 76
Discharge: HOME | End: 2021-03-15
Attending: FAMILY MEDICINE
Payer: MEDICARE

## 2021-03-15 ENCOUNTER — OFFICE VISIT (OUTPATIENT)
Dept: INTERNAL MEDICINE | Facility: CLINIC | Age: 76
End: 2021-03-15
Payer: MEDICARE

## 2021-03-15 VITALS
TEMPERATURE: 98 F | HEART RATE: 69 BPM | OXYGEN SATURATION: 96 % | DIASTOLIC BLOOD PRESSURE: 72 MMHG | HEIGHT: 66 IN | BODY MASS INDEX: 28.81 KG/M2 | SYSTOLIC BLOOD PRESSURE: 100 MMHG | WEIGHT: 179.25 LBS

## 2021-03-15 DIAGNOSIS — C50.912 BILATERAL MALIGNANT NEOPLASM OF BREAST IN FEMALE, ESTROGEN RECEPTOR POSITIVE, UNSPECIFIED SITE OF BREAST: ICD-10-CM

## 2021-03-15 DIAGNOSIS — C50.911 BILATERAL MALIGNANT NEOPLASM OF BREAST IN FEMALE, ESTROGEN RECEPTOR POSITIVE, UNSPECIFIED SITE OF BREAST: ICD-10-CM

## 2021-03-15 DIAGNOSIS — S22.42XD CLOSED FRACTURE OF MULTIPLE RIBS OF LEFT SIDE WITH ROUTINE HEALING, SUBSEQUENT ENCOUNTER: ICD-10-CM

## 2021-03-15 DIAGNOSIS — Z17.0 BILATERAL MALIGNANT NEOPLASM OF BREAST IN FEMALE, ESTROGEN RECEPTOR POSITIVE, UNSPECIFIED SITE OF BREAST: ICD-10-CM

## 2021-03-15 DIAGNOSIS — S22.42XD CLOSED FRACTURE OF MULTIPLE RIBS OF LEFT SIDE WITH ROUTINE HEALING, SUBSEQUENT ENCOUNTER: Primary | ICD-10-CM

## 2021-03-15 DIAGNOSIS — Z78.0 ASYMPTOMATIC MENOPAUSAL STATE: ICD-10-CM

## 2021-03-15 PROCEDURE — 99999 PR PBB SHADOW E&M-EST. PATIENT-LVL V: ICD-10-PCS | Mod: PBBFAC,,, | Performed by: FAMILY MEDICINE

## 2021-03-15 PROCEDURE — 71100 X-RAY EXAM RIBS UNI 2 VIEWS: CPT | Mod: TC,LT

## 2021-03-15 PROCEDURE — 99213 PR OFFICE/OUTPT VISIT, EST, LEVL III, 20-29 MIN: ICD-10-PCS | Mod: S$PBB,,, | Performed by: FAMILY MEDICINE

## 2021-03-15 PROCEDURE — 71100 X-RAY EXAM RIBS UNI 2 VIEWS: CPT | Mod: 26,LT,, | Performed by: RADIOLOGY

## 2021-03-15 PROCEDURE — 99999 PR PBB SHADOW E&M-EST. PATIENT-LVL V: CPT | Mod: PBBFAC,,, | Performed by: FAMILY MEDICINE

## 2021-03-15 PROCEDURE — 99213 OFFICE O/P EST LOW 20 MIN: CPT | Mod: S$PBB,,, | Performed by: FAMILY MEDICINE

## 2021-03-15 PROCEDURE — 99215 OFFICE O/P EST HI 40 MIN: CPT | Mod: PBBFAC | Performed by: FAMILY MEDICINE

## 2021-03-15 PROCEDURE — 71100 XR RIBS 2 VIEW LEFT: ICD-10-PCS | Mod: 26,LT,, | Performed by: RADIOLOGY

## 2021-03-16 ENCOUNTER — TELEPHONE (OUTPATIENT)
Dept: PRIMARY CARE CLINIC | Facility: CLINIC | Age: 76
End: 2021-03-16

## 2021-03-25 ENCOUNTER — OFFICE VISIT (OUTPATIENT)
Dept: OTOLARYNGOLOGY | Facility: CLINIC | Age: 76
End: 2021-03-25
Payer: MEDICARE

## 2021-03-25 ENCOUNTER — CLINICAL SUPPORT (OUTPATIENT)
Dept: AUDIOLOGY | Facility: CLINIC | Age: 76
End: 2021-03-25
Payer: MEDICARE

## 2021-03-25 VITALS — BODY MASS INDEX: 29.12 KG/M2 | TEMPERATURE: 98 F | WEIGHT: 181.19 LBS | HEIGHT: 66 IN

## 2021-03-25 DIAGNOSIS — H81.11 BPPV (BENIGN PAROXYSMAL POSITIONAL VERTIGO), RIGHT: Primary | ICD-10-CM

## 2021-03-25 DIAGNOSIS — J30.9 ALLERGIC RHINITIS, UNSPECIFIED SEASONALITY, UNSPECIFIED TRIGGER: ICD-10-CM

## 2021-03-25 DIAGNOSIS — H81.11 BENIGN PAROXYSMAL POSITIONAL VERTIGO OF RIGHT EAR: Primary | ICD-10-CM

## 2021-03-25 PROCEDURE — 99213 OFFICE O/P EST LOW 20 MIN: CPT | Mod: S$PBB,,, | Performed by: PHYSICIAN ASSISTANT

## 2021-03-25 PROCEDURE — 99999 PR PBB SHADOW E&M-EST. PATIENT-LVL III: CPT | Mod: PBBFAC,,, | Performed by: PHYSICIAN ASSISTANT

## 2021-03-25 PROCEDURE — 99213 OFFICE O/P EST LOW 20 MIN: CPT | Mod: PBBFAC | Performed by: PHYSICIAN ASSISTANT

## 2021-03-25 PROCEDURE — 99999 PR PBB SHADOW E&M-EST. PATIENT-LVL III: ICD-10-PCS | Mod: PBBFAC,,, | Performed by: PHYSICIAN ASSISTANT

## 2021-03-25 PROCEDURE — 99213 PR OFFICE/OUTPT VISIT, EST, LEVL III, 20-29 MIN: ICD-10-PCS | Mod: S$PBB,,, | Performed by: PHYSICIAN ASSISTANT

## 2021-04-01 ENCOUNTER — CLINICAL SUPPORT (OUTPATIENT)
Dept: AUDIOLOGY | Facility: CLINIC | Age: 76
End: 2021-04-01
Payer: MEDICARE

## 2021-04-01 DIAGNOSIS — H81.11 BPPV (BENIGN PAROXYSMAL POSITIONAL VERTIGO), RIGHT: Primary | ICD-10-CM

## 2021-04-01 PROCEDURE — 92542 POSITIONAL NYSTAGMUS TEST: CPT | Mod: 26,S$PBB,, | Performed by: AUDIOLOGIST-HEARING AID FITTER

## 2021-04-01 PROCEDURE — 92542 PR POSITIONAL NYSTAGMUS TEST: ICD-10-PCS | Mod: 26,S$PBB,, | Performed by: AUDIOLOGIST-HEARING AID FITTER

## 2021-04-01 PROCEDURE — 92542 POSITIONAL NYSTAGMUS TEST: CPT | Mod: PBBFAC | Performed by: AUDIOLOGIST-HEARING AID FITTER

## 2021-04-05 ENCOUNTER — TELEPHONE (OUTPATIENT)
Dept: INTERNAL MEDICINE | Facility: CLINIC | Age: 76
End: 2021-04-05

## 2021-04-23 ENCOUNTER — PATIENT MESSAGE (OUTPATIENT)
Dept: INTERNAL MEDICINE | Facility: CLINIC | Age: 76
End: 2021-04-23

## 2021-04-28 DIAGNOSIS — R60.9 EDEMA, UNSPECIFIED TYPE: ICD-10-CM

## 2021-04-29 ENCOUNTER — PATIENT MESSAGE (OUTPATIENT)
Dept: INTERNAL MEDICINE | Facility: CLINIC | Age: 76
End: 2021-04-29

## 2021-04-30 RX ORDER — HYDROCHLOROTHIAZIDE 25 MG/1
TABLET ORAL
Qty: 90 TABLET | Refills: 1 | Status: SHIPPED | OUTPATIENT
Start: 2021-04-30 | End: 2021-10-28

## 2021-05-04 ENCOUNTER — PATIENT OUTREACH (OUTPATIENT)
Dept: ADMINISTRATIVE | Facility: HOSPITAL | Age: 76
End: 2021-05-04

## 2021-07-16 DIAGNOSIS — E78.5 HYPERLIPIDEMIA, UNSPECIFIED HYPERLIPIDEMIA TYPE: Primary | ICD-10-CM

## 2021-07-19 ENCOUNTER — PATIENT MESSAGE (OUTPATIENT)
Dept: INTERNAL MEDICINE | Facility: CLINIC | Age: 76
End: 2021-07-19

## 2021-07-19 RX ORDER — PROPRANOLOL HYDROCHLORIDE 120 MG/1
CAPSULE, EXTENDED RELEASE ORAL
Qty: 90 CAPSULE | Refills: 2 | Status: SHIPPED | OUTPATIENT
Start: 2021-07-19 | End: 2022-03-24

## 2021-10-18 ENCOUNTER — LAB VISIT (OUTPATIENT)
Dept: LAB | Facility: HOSPITAL | Age: 76
End: 2021-10-18
Attending: FAMILY MEDICINE
Payer: MEDICARE

## 2021-10-18 ENCOUNTER — OFFICE VISIT (OUTPATIENT)
Dept: INTERNAL MEDICINE | Facility: CLINIC | Age: 76
End: 2021-10-18
Payer: MEDICARE

## 2021-10-18 VITALS
HEIGHT: 66 IN | SYSTOLIC BLOOD PRESSURE: 120 MMHG | BODY MASS INDEX: 29.69 KG/M2 | OXYGEN SATURATION: 98 % | HEART RATE: 66 BPM | DIASTOLIC BLOOD PRESSURE: 60 MMHG | WEIGHT: 184.75 LBS | TEMPERATURE: 99 F

## 2021-10-18 DIAGNOSIS — E78.5 HYPERLIPIDEMIA, UNSPECIFIED HYPERLIPIDEMIA TYPE: ICD-10-CM

## 2021-10-18 DIAGNOSIS — E55.9 VITAMIN D DEFICIENCY DISEASE: ICD-10-CM

## 2021-10-18 DIAGNOSIS — Z17.0 BILATERAL MALIGNANT NEOPLASM OF BREAST IN FEMALE, ESTROGEN RECEPTOR POSITIVE, UNSPECIFIED SITE OF BREAST: ICD-10-CM

## 2021-10-18 DIAGNOSIS — E78.5 HYPERLIPIDEMIA, UNSPECIFIED HYPERLIPIDEMIA TYPE: Primary | ICD-10-CM

## 2021-10-18 DIAGNOSIS — M81.0 OSTEOPOROSIS, POST-MENOPAUSAL: ICD-10-CM

## 2021-10-18 DIAGNOSIS — C50.911 BILATERAL MALIGNANT NEOPLASM OF BREAST IN FEMALE, ESTROGEN RECEPTOR POSITIVE, UNSPECIFIED SITE OF BREAST: ICD-10-CM

## 2021-10-18 DIAGNOSIS — C50.912 BILATERAL MALIGNANT NEOPLASM OF BREAST IN FEMALE, ESTROGEN RECEPTOR POSITIVE, UNSPECIFIED SITE OF BREAST: ICD-10-CM

## 2021-10-18 LAB
25(OH)D3+25(OH)D2 SERPL-MCNC: 59 NG/ML (ref 30–96)
ALBUMIN SERPL BCP-MCNC: 3.8 G/DL (ref 3.5–5.2)
ALP SERPL-CCNC: 68 U/L (ref 55–135)
ALT SERPL W/O P-5'-P-CCNC: 23 U/L (ref 10–44)
ANION GAP SERPL CALC-SCNC: 13 MMOL/L (ref 8–16)
AST SERPL-CCNC: 24 U/L (ref 10–40)
BILIRUB SERPL-MCNC: 0.4 MG/DL (ref 0.1–1)
BUN SERPL-MCNC: 13 MG/DL (ref 8–23)
CALCIUM SERPL-MCNC: 9.6 MG/DL (ref 8.7–10.5)
CHLORIDE SERPL-SCNC: 99 MMOL/L (ref 95–110)
CHOLEST SERPL-MCNC: 232 MG/DL (ref 120–199)
CHOLEST SERPL-MCNC: 232 MG/DL (ref 120–199)
CHOLEST/HDLC SERPL: 5.3 {RATIO} (ref 2–5)
CHOLEST/HDLC SERPL: 5.3 {RATIO} (ref 2–5)
CO2 SERPL-SCNC: 26 MMOL/L (ref 23–29)
CREAT SERPL-MCNC: 0.7 MG/DL (ref 0.5–1.4)
EST. GFR  (AFRICAN AMERICAN): >60 ML/MIN/1.73 M^2
EST. GFR  (NON AFRICAN AMERICAN): >60 ML/MIN/1.73 M^2
GLUCOSE SERPL-MCNC: 95 MG/DL (ref 70–110)
HDLC SERPL-MCNC: 44 MG/DL (ref 40–75)
HDLC SERPL-MCNC: 44 MG/DL (ref 40–75)
HDLC SERPL: 19 % (ref 20–50)
HDLC SERPL: 19 % (ref 20–50)
LDLC SERPL CALC-MCNC: ABNORMAL MG/DL (ref 63–159)
LDLC SERPL CALC-MCNC: ABNORMAL MG/DL (ref 63–159)
NONHDLC SERPL-MCNC: 188 MG/DL
NONHDLC SERPL-MCNC: 188 MG/DL
POTASSIUM SERPL-SCNC: 4 MMOL/L (ref 3.5–5.1)
PROT SERPL-MCNC: 6.9 G/DL (ref 6–8.4)
SODIUM SERPL-SCNC: 138 MMOL/L (ref 136–145)
TRIGL SERPL-MCNC: 437 MG/DL (ref 30–150)
TRIGL SERPL-MCNC: 437 MG/DL (ref 30–150)

## 2021-10-18 PROCEDURE — 80053 COMPREHEN METABOLIC PANEL: CPT | Performed by: FAMILY MEDICINE

## 2021-10-18 PROCEDURE — 90694 VACC AIIV4 NO PRSRV 0.5ML IM: CPT | Mod: PBBFAC

## 2021-10-18 PROCEDURE — 36415 COLL VENOUS BLD VENIPUNCTURE: CPT | Performed by: FAMILY MEDICINE

## 2021-10-18 PROCEDURE — 99214 PR OFFICE/OUTPT VISIT, EST, LEVL IV, 30-39 MIN: ICD-10-PCS | Mod: S$PBB,,, | Performed by: FAMILY MEDICINE

## 2021-10-18 PROCEDURE — 99214 OFFICE O/P EST MOD 30 MIN: CPT | Mod: S$PBB,,, | Performed by: FAMILY MEDICINE

## 2021-10-18 PROCEDURE — 82306 VITAMIN D 25 HYDROXY: CPT | Performed by: FAMILY MEDICINE

## 2021-10-18 PROCEDURE — 99999 PR PBB SHADOW E&M-EST. PATIENT-LVL IV: CPT | Mod: PBBFAC,,, | Performed by: FAMILY MEDICINE

## 2021-10-18 PROCEDURE — 99214 OFFICE O/P EST MOD 30 MIN: CPT | Mod: PBBFAC,25 | Performed by: FAMILY MEDICINE

## 2021-10-18 PROCEDURE — 80061 LIPID PANEL: CPT | Performed by: FAMILY MEDICINE

## 2021-10-18 PROCEDURE — 99999 PR PBB SHADOW E&M-EST. PATIENT-LVL IV: ICD-10-PCS | Mod: PBBFAC,,, | Performed by: FAMILY MEDICINE

## 2021-10-18 PROCEDURE — G0008 ADMIN INFLUENZA VIRUS VAC: HCPCS | Mod: PBBFAC

## 2021-10-25 ENCOUNTER — PATIENT MESSAGE (OUTPATIENT)
Dept: INTERNAL MEDICINE | Facility: CLINIC | Age: 76
End: 2021-10-25
Payer: MEDICARE

## 2021-10-25 RX ORDER — ATORVASTATIN CALCIUM 20 MG/1
20 TABLET, FILM COATED ORAL DAILY
Qty: 90 TABLET | Refills: 3 | Status: SHIPPED | OUTPATIENT
Start: 2021-10-25 | End: 2022-07-14

## 2021-10-28 DIAGNOSIS — R60.9 EDEMA, UNSPECIFIED TYPE: ICD-10-CM

## 2021-10-28 RX ORDER — HYDROCHLOROTHIAZIDE 25 MG/1
25 TABLET ORAL DAILY
Qty: 90 TABLET | Refills: 3 | Status: SHIPPED | OUTPATIENT
Start: 2021-10-28 | End: 2022-09-21

## 2021-11-22 ENCOUNTER — TELEPHONE (OUTPATIENT)
Dept: INTERNAL MEDICINE | Facility: CLINIC | Age: 76
End: 2021-11-22
Payer: MEDICARE

## 2021-11-22 ENCOUNTER — PATIENT MESSAGE (OUTPATIENT)
Dept: INTERNAL MEDICINE | Facility: CLINIC | Age: 76
End: 2021-11-22
Payer: MEDICARE

## 2021-11-22 RX ORDER — NITROFURANTOIN (MACROCRYSTALS) 100 MG/1
100 CAPSULE ORAL 4 TIMES DAILY
Qty: 28 CAPSULE | Refills: 0 | Status: SHIPPED | OUTPATIENT
Start: 2021-11-22 | End: 2022-01-18

## 2021-12-29 ENCOUNTER — PES CALL (OUTPATIENT)
Dept: ADMINISTRATIVE | Facility: CLINIC | Age: 76
End: 2021-12-29
Payer: MEDICARE

## 2022-01-18 ENCOUNTER — OFFICE VISIT (OUTPATIENT)
Dept: INTERNAL MEDICINE | Facility: CLINIC | Age: 77
End: 2022-01-18
Payer: MEDICARE

## 2022-01-18 VITALS
SYSTOLIC BLOOD PRESSURE: 122 MMHG | HEIGHT: 66 IN | OXYGEN SATURATION: 95 % | HEART RATE: 80 BPM | BODY MASS INDEX: 31.36 KG/M2 | WEIGHT: 195.13 LBS | TEMPERATURE: 99 F | DIASTOLIC BLOOD PRESSURE: 77 MMHG

## 2022-01-18 DIAGNOSIS — C50.912 BILATERAL MALIGNANT NEOPLASM OF BREAST IN FEMALE, ESTROGEN RECEPTOR POSITIVE, UNSPECIFIED SITE OF BREAST: ICD-10-CM

## 2022-01-18 DIAGNOSIS — Z17.0 BILATERAL MALIGNANT NEOPLASM OF BREAST IN FEMALE, ESTROGEN RECEPTOR POSITIVE, UNSPECIFIED SITE OF BREAST: ICD-10-CM

## 2022-01-18 DIAGNOSIS — C50.911 BILATERAL MALIGNANT NEOPLASM OF BREAST IN FEMALE, ESTROGEN RECEPTOR POSITIVE, UNSPECIFIED SITE OF BREAST: ICD-10-CM

## 2022-01-18 DIAGNOSIS — E78.5 HYPERLIPIDEMIA, UNSPECIFIED HYPERLIPIDEMIA TYPE: Primary | ICD-10-CM

## 2022-01-18 DIAGNOSIS — S22.42XD CLOSED FRACTURE OF MULTIPLE RIBS OF LEFT SIDE WITH ROUTINE HEALING: ICD-10-CM

## 2022-01-18 PROCEDURE — 99999 PR PBB SHADOW E&M-EST. PATIENT-LVL III: ICD-10-PCS | Mod: PBBFAC,,, | Performed by: FAMILY MEDICINE

## 2022-01-18 PROCEDURE — 99999 PR PBB SHADOW E&M-EST. PATIENT-LVL III: CPT | Mod: PBBFAC,,, | Performed by: FAMILY MEDICINE

## 2022-01-18 PROCEDURE — 99213 OFFICE O/P EST LOW 20 MIN: CPT | Mod: PBBFAC | Performed by: FAMILY MEDICINE

## 2022-01-18 PROCEDURE — 99214 PR OFFICE/OUTPT VISIT, EST, LEVL IV, 30-39 MIN: ICD-10-PCS | Mod: S$PBB,,, | Performed by: FAMILY MEDICINE

## 2022-01-18 PROCEDURE — 99214 OFFICE O/P EST MOD 30 MIN: CPT | Mod: S$PBB,,, | Performed by: FAMILY MEDICINE

## 2022-01-27 ENCOUNTER — TELEPHONE (OUTPATIENT)
Dept: ADMINISTRATIVE | Facility: HOSPITAL | Age: 77
End: 2022-01-27
Payer: MEDICARE

## 2022-03-24 RX ORDER — PROPRANOLOL HYDROCHLORIDE 120 MG/1
CAPSULE, EXTENDED RELEASE ORAL
Qty: 90 CAPSULE | Refills: 2 | Status: SHIPPED | OUTPATIENT
Start: 2022-03-24 | End: 2022-12-14

## 2022-03-24 NOTE — TELEPHONE ENCOUNTER
No new care gaps identified.  Powered by [employer] [facility]. Reference number: [id number].   3/24/2022 12:45:20 PM CDT

## 2022-03-24 NOTE — TELEPHONE ENCOUNTER
This Rx Request does not qualify for assessment with the ORC   Please review protocol details and the Care Due Message for extra clinical information    Reasons Rx Request may be deferred:  Indication is outside of protocol for ORC    Note composed:12:48 PM 03/24/2022

## 2022-04-18 ENCOUNTER — HOSPITAL ENCOUNTER (OUTPATIENT)
Dept: RADIOLOGY | Facility: HOSPITAL | Age: 77
Discharge: HOME | End: 2022-04-18
Attending: FAMILY MEDICINE
Payer: MEDICARE

## 2022-04-18 ENCOUNTER — OFFICE VISIT (OUTPATIENT)
Dept: INTERNAL MEDICINE | Facility: CLINIC | Age: 77
End: 2022-04-18
Payer: MEDICARE

## 2022-04-18 VITALS
DIASTOLIC BLOOD PRESSURE: 68 MMHG | TEMPERATURE: 99 F | HEART RATE: 72 BPM | HEIGHT: 66 IN | BODY MASS INDEX: 31.82 KG/M2 | SYSTOLIC BLOOD PRESSURE: 116 MMHG | WEIGHT: 198 LBS | OXYGEN SATURATION: 96 %

## 2022-04-18 DIAGNOSIS — S22.42XD CLOSED FRACTURE OF MULTIPLE RIBS OF LEFT SIDE WITH ROUTINE HEALING, SUBSEQUENT ENCOUNTER: ICD-10-CM

## 2022-04-18 DIAGNOSIS — E78.5 HYPERLIPIDEMIA, UNSPECIFIED HYPERLIPIDEMIA TYPE: ICD-10-CM

## 2022-04-18 DIAGNOSIS — M85.80 OSTEOPENIA, UNSPECIFIED LOCATION: Primary | ICD-10-CM

## 2022-04-18 DIAGNOSIS — H91.93 DECREASED HEARING OF BOTH EARS: ICD-10-CM

## 2022-04-18 PROCEDURE — 99214 OFFICE O/P EST MOD 30 MIN: CPT | Mod: PBBFAC | Performed by: FAMILY MEDICINE

## 2022-04-18 PROCEDURE — 71100 X-RAY EXAM RIBS UNI 2 VIEWS: CPT | Mod: 26,LT,, | Performed by: RADIOLOGY

## 2022-04-18 PROCEDURE — 99999 PR PBB SHADOW E&M-EST. PATIENT-LVL IV: ICD-10-PCS | Mod: PBBFAC,,, | Performed by: FAMILY MEDICINE

## 2022-04-18 PROCEDURE — 99999 PR PBB SHADOW E&M-EST. PATIENT-LVL IV: CPT | Mod: PBBFAC,,, | Performed by: FAMILY MEDICINE

## 2022-04-18 PROCEDURE — 71100 X-RAY EXAM RIBS UNI 2 VIEWS: CPT | Mod: TC,LT

## 2022-04-18 PROCEDURE — 99214 PR OFFICE/OUTPT VISIT, EST, LEVL IV, 30-39 MIN: ICD-10-PCS | Mod: S$PBB,,, | Performed by: FAMILY MEDICINE

## 2022-04-18 PROCEDURE — 71100 XR RIBS 2 VIEW LEFT: ICD-10-PCS | Mod: 26,LT,, | Performed by: RADIOLOGY

## 2022-04-18 PROCEDURE — 99214 OFFICE O/P EST MOD 30 MIN: CPT | Mod: S$PBB,,, | Performed by: FAMILY MEDICINE

## 2022-04-18 NOTE — PROGRESS NOTES
Subjective:       Patient ID: Teresa Orozco is a 76 y.o. female.    Chief Complaint: Follow-up (3 month f/u)    Follow-up hyperlipidemia osteopenia stomatitis rib fracture.  Stomatitis to improved after dentist prescribed nystatin for thrush.  She resumed Lipitor with no increase symptoms tongue soreness.  Bone density done April 2021 a T-score of -2.1 femur -1.5 hip reflecting osteopenia.  She had a recent dental implant.  She anticipates ongoing dental surgery for several to issues.  She did have several rib fractures after bending over in pushing hard to do something.  Her BMI is 31.95.    Review of Systems   Constitutional: Negative for chills and fever.   HENT: Positive for dental problem and hearing loss.    Respiratory: Negative for cough, chest tightness, shortness of breath and wheezing.    Cardiovascular: Negative for chest pain, palpitations and leg swelling.   Neurological: Negative for dizziness, weakness, light-headedness and headaches.       Objective:      Physical Exam  Constitutional:       General: She is not in acute distress.     Appearance: She is not ill-appearing or diaphoretic.   HENT:      Ears:      Comments: Minimal cerumen left external canal moderate cerumen  but no obstruction right external canal  Cardiovascular:      Rate and Rhythm: Normal rate and regular rhythm.      Heart sounds: No murmur heard.    No gallop.   Pulmonary:      Effort: Pulmonary effort is normal. No respiratory distress.      Breath sounds: No wheezing, rhonchi or rales.   Neurological:      Mental Status: She is alert.         Lab Visit on 10/18/2021   Component Date Value Ref Range Status    Sodium 10/18/2021 138  136 - 145 mmol/L Final    Potassium 10/18/2021 4.0  3.5 - 5.1 mmol/L Final    Chloride 10/18/2021 99  95 - 110 mmol/L Final    CO2 10/18/2021 26  23 - 29 mmol/L Final    Glucose 10/18/2021 95  70 - 110 mg/dL Final    BUN 10/18/2021 13  8 - 23 mg/dL Final    Creatinine 10/18/2021 0.7  0.5 - 1.4  mg/dL Final    Calcium 10/18/2021 9.6  8.7 - 10.5 mg/dL Final    Total Protein 10/18/2021 6.9  6.0 - 8.4 g/dL Final    Albumin 10/18/2021 3.8  3.5 - 5.2 g/dL Final    Total Bilirubin 10/18/2021 0.4  0.1 - 1.0 mg/dL Final    Alkaline Phosphatase 10/18/2021 68  55 - 135 U/L Final    AST 10/18/2021 24  10 - 40 U/L Final    ALT 10/18/2021 23  10 - 44 U/L Final    Anion Gap 10/18/2021 13  8 - 16 mmol/L Final    eGFR if African American 10/18/2021 >60.0  >60 mL/min/1.73 m^2 Final    eGFR if non African American 10/18/2021 >60.0  >60 mL/min/1.73 m^2 Final    Cholesterol 10/18/2021 232 (A) 120 - 199 mg/dL Final    Triglycerides 10/18/2021 437 (A) 30 - 150 mg/dL Final    HDL 10/18/2021 44  40 - 75 mg/dL Final    LDL Cholesterol 10/18/2021 Invalid, Trig>400.0  63.0 - 159.0 mg/dL Final    HDL/Cholesterol Ratio 10/18/2021 19.0 (A) 20.0 - 50.0 % Final    Total Cholesterol/HDL Ratio 10/18/2021 5.3 (A) 2.0 - 5.0 Final    Non-HDL Cholesterol 10/18/2021 188  mg/dL Final    Cholesterol 10/18/2021 232 (A) 120 - 199 mg/dL Final    Triglycerides 10/18/2021 437 (A) 30 - 150 mg/dL Final    HDL 10/18/2021 44  40 - 75 mg/dL Final    LDL Cholesterol 10/18/2021 Invalid, Trig>400.0  63.0 - 159.0 mg/dL Final    HDL/Cholesterol Ratio 10/18/2021 19.0 (A) 20.0 - 50.0 % Final    Total Cholesterol/HDL Ratio 10/18/2021 5.3 (A) 2.0 - 5.0 Final    Non-HDL Cholesterol 10/18/2021 188  mg/dL Final    Vit D, 25-Hydroxy 10/18/2021 59  30 - 96 ng/mL Final     Assessment:       1. Osteopenia, unspecified location    2. Closed fracture of multiple ribs of left side with routine healing, subsequent encounter    3. Hyperlipidemia, unspecified hyperlipidemia type    4. Decreased hearing of both ears        Plan:     Continue Lipitor.  Bone scan done in relationship breast cancer with 2 punctate foci of increased uptake in the left left the 12 ribs.  Oncology not feel that this was metastatic related.  Will re-x-ray ribs for completeness.   In view of ongoing dental issues requiring extraction and short dental implants do not recommend treating osteopenia with fracture at this time.  Follow-up in 6 months recommend home cerumen removal.    Osteopenia, unspecified location    Closed fracture of multiple ribs of left side with routine healing, subsequent encounter  -     X-Ray Ribs 2 View Left; Future; Expected date: 04/18/2022    Hyperlipidemia, unspecified hyperlipidemia type    Decreased hearing of both ears

## 2022-05-17 ENCOUNTER — PES CALL (OUTPATIENT)
Dept: ADMINISTRATIVE | Facility: CLINIC | Age: 77
End: 2022-05-17
Payer: MEDICARE

## 2022-07-14 RX ORDER — ATORVASTATIN CALCIUM 20 MG/1
TABLET, FILM COATED ORAL
Qty: 90 TABLET | Refills: 1 | Status: SHIPPED | OUTPATIENT
Start: 2022-07-14 | End: 2022-12-15

## 2022-07-15 NOTE — TELEPHONE ENCOUNTER
No new care gaps identified.  [facility] Embedded Care Gaps. Reference number: [id number]. 7/14/2022   7:22:41 PM CDT

## 2022-07-15 NOTE — TELEPHONE ENCOUNTER
Refill Decision Note   Teresa Orozco  is requesting a refill authorization.  Brief Assessment and Rationale for Refill:  Approve     Medication Therapy Plan:       Medication Reconciliation Completed: No   Comments:     No Care Gaps recommended.     Note composed:7:23 PM 07/14/2022

## 2022-08-25 ENCOUNTER — OFFICE VISIT (OUTPATIENT)
Dept: OTOLARYNGOLOGY | Facility: CLINIC | Age: 77
End: 2022-08-25
Payer: MEDICARE

## 2022-08-25 DIAGNOSIS — H91.90 PERCEIVED HEARING CHANGES: ICD-10-CM

## 2022-08-25 DIAGNOSIS — H61.23 BILATERAL IMPACTED CERUMEN: Primary | ICD-10-CM

## 2022-08-25 PROCEDURE — 99999 PR PBB SHADOW E&M-EST. PATIENT-LVL II: ICD-10-PCS | Mod: PBBFAC,,, | Performed by: PHYSICIAN ASSISTANT

## 2022-08-25 PROCEDURE — 99999 PR PBB SHADOW E&M-EST. PATIENT-LVL II: CPT | Mod: PBBFAC,,, | Performed by: PHYSICIAN ASSISTANT

## 2022-08-25 PROCEDURE — 99212 OFFICE O/P EST SF 10 MIN: CPT | Mod: 25,S$PBB,, | Performed by: PHYSICIAN ASSISTANT

## 2022-08-25 PROCEDURE — 69210 REMOVE IMPACTED EAR WAX UNI: CPT | Mod: PBBFAC | Performed by: PHYSICIAN ASSISTANT

## 2022-08-25 PROCEDURE — 99212 OFFICE O/P EST SF 10 MIN: CPT | Mod: PBBFAC | Performed by: PHYSICIAN ASSISTANT

## 2022-08-25 PROCEDURE — 69210 REMOVE IMPACTED EAR WAX UNI: CPT | Mod: S$PBB,,, | Performed by: PHYSICIAN ASSISTANT

## 2022-08-25 PROCEDURE — 99212 PR OFFICE/OUTPT VISIT, EST, LEVL II, 10-19 MIN: ICD-10-PCS | Mod: 25,S$PBB,, | Performed by: PHYSICIAN ASSISTANT

## 2022-08-25 PROCEDURE — 69210 PR REMOVAL IMPACTED CERUMEN REQUIRING INSTRUMENTATION, UNILATERAL: ICD-10-PCS | Mod: S$PBB,,, | Performed by: PHYSICIAN ASSISTANT

## 2022-08-25 NOTE — PROGRESS NOTES
Subjective:       Patient ID: Teresa Orozco is a 76 y.o. female.    Chief Complaint: Hearing Loss    Patient is a very pleasant 76 year old female here to see me today for muffled hearing in her right ear over the past week.  No ear pain or drainage.  She has not used any ear drops.  She has had issues with cerumen impactions in the past but not recently.  Denies dizziness.  She does wear ear plugs at night.    Review of Systems   Constitutional: Negative.    HENT: Positive for hearing loss. Negative for tinnitus.    Eyes: Negative.    Respiratory: Negative.    Cardiovascular: Negative.    Gastrointestinal: Negative.    Endocrine: Negative.    Genitourinary: Negative.    Musculoskeletal: Positive for neck pain.   Integumentary:  Negative.   Neurological: Positive for light-headedness and headaches. Negative for dizziness.   Hematological: Negative.    Psychiatric/Behavioral: Negative.          Objective:      Physical Exam  Constitutional:       Appearance: Normal appearance.   HENT:      Head: Normocephalic and atraumatic.      Right Ear: Ear canal and external ear normal. There is impacted cerumen (removal described below).      Left Ear: Ear canal and external ear normal. There is impacted cerumen.      Nose: Nose normal.      Mouth/Throat:      Mouth: Mucous membranes are moist.   Pulmonary:      Effort: Pulmonary effort is normal.   Neurological:      General: No focal deficit present.      Mental Status: She is alert.           Procedure Note    CHIEF COMPLAINT:  Cerumen Impaction    Description:  The patient was seated in an exam chair.  An ear speculum was placed in the right EAC and was examined under the microscope.  Suction and/or loop curettes were used to remove a large cerumen impaction.  The tympanic membrane was visualized and was normal in appearance.  The procedure was repeated on the left side in a similar fashion.  The TM was intact and normal on this side as well.  The patient tolerated the  procedure well.      Assessment:       Problem List Items Addressed This Visit    None     Visit Diagnoses     Bilateral impacted cerumen    -  Primary    Perceived hearing changes              Plan:          Cerumen impaction:  Removed today without difficulty.  I would recommend the use of a wax softening drop, either over the counter Debrox or mineral oil, on a weekly basis.  I also instructed the patient to avoid Qtips.  She notes immediate improvement in her hearing today once cerumen impactions removed.  Recommend scheduling an audiogram if any further concerns about her hearing.  Plan for repeat ear cleaning in 6-12 months.    Answers for HPI/ROS submitted by the patient on 8/24/2022  Seasonal Allergies?: Yes

## 2022-08-25 NOTE — PROGRESS NOTES
Answers for HPI/ROS submitted by the patient on 8/24/2022  None of these: Yes  hearing loss: Yes  None of these : Yes  None of these: Yes  None of these : Yes  None of these: Yes  None of these: Yes  neck pain: Yes  None of these : Yes  Seasonal Allergies?: Yes  None of these : Yes  dizziness: Yes  headaches: Yes  Light-headedness: Yes  None of these: Yes  None of these: Yes

## 2022-10-17 ENCOUNTER — LAB VISIT (OUTPATIENT)
Dept: LAB | Facility: HOSPITAL | Age: 77
End: 2022-10-17
Attending: FAMILY MEDICINE
Payer: MEDICARE

## 2022-10-17 ENCOUNTER — OFFICE VISIT (OUTPATIENT)
Dept: INTERNAL MEDICINE | Facility: CLINIC | Age: 77
End: 2022-10-17
Payer: MEDICARE

## 2022-10-17 VITALS
RESPIRATION RATE: 16 BRPM | DIASTOLIC BLOOD PRESSURE: 86 MMHG | WEIGHT: 189.81 LBS | HEIGHT: 66 IN | BODY MASS INDEX: 30.51 KG/M2 | TEMPERATURE: 100 F | OXYGEN SATURATION: 97 % | SYSTOLIC BLOOD PRESSURE: 128 MMHG | HEART RATE: 63 BPM

## 2022-10-17 DIAGNOSIS — E78.5 HYPERLIPIDEMIA, UNSPECIFIED HYPERLIPIDEMIA TYPE: ICD-10-CM

## 2022-10-17 DIAGNOSIS — M85.80 OSTEOPENIA, UNSPECIFIED LOCATION: ICD-10-CM

## 2022-10-17 DIAGNOSIS — R73.9 HYPERGLYCEMIA: Primary | ICD-10-CM

## 2022-10-17 DIAGNOSIS — E87.6 HYPOKALEMIA: ICD-10-CM

## 2022-10-17 DIAGNOSIS — K21.9 GASTROESOPHAGEAL REFLUX DISEASE, UNSPECIFIED WHETHER ESOPHAGITIS PRESENT: ICD-10-CM

## 2022-10-17 DIAGNOSIS — R73.9 HYPERGLYCEMIA: ICD-10-CM

## 2022-10-17 DIAGNOSIS — L98.9 SKIN LESION: ICD-10-CM

## 2022-10-17 LAB
ESTIMATED AVG GLUCOSE: 117 MG/DL (ref 68–131)
GLUCOSE SERPL-MCNC: 107 MG/DL (ref 70–110)
HBA1C MFR BLD: 5.7 % (ref 4–5.6)
POTASSIUM SERPL-SCNC: 3.4 MMOL/L (ref 3.5–5.1)

## 2022-10-17 PROCEDURE — 99214 OFFICE O/P EST MOD 30 MIN: CPT | Mod: PBBFAC,25 | Performed by: FAMILY MEDICINE

## 2022-10-17 PROCEDURE — 99999 PR PBB SHADOW E&M-EST. PATIENT-LVL IV: ICD-10-PCS | Mod: PBBFAC,,, | Performed by: FAMILY MEDICINE

## 2022-10-17 PROCEDURE — 84132 ASSAY OF SERUM POTASSIUM: CPT | Performed by: FAMILY MEDICINE

## 2022-10-17 PROCEDURE — 99999 PR PBB SHADOW E&M-EST. PATIENT-LVL IV: CPT | Mod: PBBFAC,,, | Performed by: FAMILY MEDICINE

## 2022-10-17 PROCEDURE — 99214 PR OFFICE/OUTPT VISIT, EST, LEVL IV, 30-39 MIN: ICD-10-PCS | Mod: S$PBB,,, | Performed by: FAMILY MEDICINE

## 2022-10-17 PROCEDURE — 82947 ASSAY GLUCOSE BLOOD QUANT: CPT | Performed by: FAMILY MEDICINE

## 2022-10-17 PROCEDURE — 36415 COLL VENOUS BLD VENIPUNCTURE: CPT | Performed by: FAMILY MEDICINE

## 2022-10-17 PROCEDURE — 83036 HEMOGLOBIN GLYCOSYLATED A1C: CPT | Performed by: FAMILY MEDICINE

## 2022-10-17 PROCEDURE — G0008 ADMIN INFLUENZA VIRUS VAC: HCPCS | Mod: PBBFAC

## 2022-10-17 PROCEDURE — 99214 OFFICE O/P EST MOD 30 MIN: CPT | Mod: S$PBB,,, | Performed by: FAMILY MEDICINE

## 2022-10-17 NOTE — PATIENT INSTRUCTIONS
Citracal plus d  600 mg a day fo 1mo and if no constipation increase to 1200 mg a day  hold current vit d for now

## 2022-10-17 NOTE — PROGRESS NOTES
Subjective:       Patient ID: Teresa Orozco is a 77 y.o. female.    Chief Complaint: Follow-up    Follow-up hyperlipidemia osteopenia esophageal reflux elevated BMI.  Last visit her weight decreased 197-188.  She has altered her diet.  BMI is improved.  Reflux controlled with Nexium.  She has osteopenia.  She has ongoing dental issues and procedures.  Recommend Citracal +D start with 600 mg a day increased to 1200 mg a day in 1 month pending constipation issues.  She has a lesion of her forehead.  This is recent onset and persistent.  She will be seen her  dermatologist about this.    Review of Systems   Constitutional:  Negative for activity change, appetite change, diaphoresis, fever and unexpected weight change.   Respiratory:  Negative for cough, chest tightness, shortness of breath and wheezing.    Cardiovascular:  Negative for chest pain, palpitations and leg swelling.   Gastrointestinal:  Negative for abdominal distention, abdominal pain, diarrhea and nausea.        Occasional constipation in the past.  Reflux is controlled.   Neurological:  Negative for dizziness, weakness, light-headedness and headaches.     Objective:      Physical Exam  Constitutional:       General: She is not in acute distress.     Appearance: She is not ill-appearing or diaphoretic.   Cardiovascular:      Rate and Rhythm: Normal rate and regular rhythm.      Heart sounds: No murmur heard.    No gallop.   Pulmonary:      Effort: Pulmonary effort is normal. No respiratory distress.      Breath sounds: No wheezing, rhonchi or rales.   Abdominal:      General: There is no distension.      Palpations: Abdomen is soft. There is no mass.   Lymphadenopathy:      Cervical: No cervical adenopathy.   Skin:     General: Skin is warm and dry.      Coloration: Skin is not pale.      Findings: No erythema.      Comments: 3-4 mm lesion of the frontal scalp.  Appears benign but recommend dermatology referral   Neurological:      Mental Status: She is  alert and oriented to person, place, and time.   Psychiatric:         Mood and Affect: Mood normal.         Behavior: Behavior normal.         Thought Content: Thought content normal.         Judgment: Judgment normal.       Lab Visit on 10/18/2021   Component Date Value Ref Range Status    Sodium 10/18/2021 138  136 - 145 mmol/L Final    Potassium 10/18/2021 4.0  3.5 - 5.1 mmol/L Final    Chloride 10/18/2021 99  95 - 110 mmol/L Final    CO2 10/18/2021 26  23 - 29 mmol/L Final    Glucose 10/18/2021 95  70 - 110 mg/dL Final    BUN 10/18/2021 13  8 - 23 mg/dL Final    Creatinine 10/18/2021 0.7  0.5 - 1.4 mg/dL Final    Calcium 10/18/2021 9.6  8.7 - 10.5 mg/dL Final    Total Protein 10/18/2021 6.9  6.0 - 8.4 g/dL Final    Albumin 10/18/2021 3.8  3.5 - 5.2 g/dL Final    Total Bilirubin 10/18/2021 0.4  0.1 - 1.0 mg/dL Final    Alkaline Phosphatase 10/18/2021 68  55 - 135 U/L Final    AST 10/18/2021 24  10 - 40 U/L Final    ALT 10/18/2021 23  10 - 44 U/L Final    Anion Gap 10/18/2021 13  8 - 16 mmol/L Final    eGFR if African American 10/18/2021 >60.0  >60 mL/min/1.73 m^2 Final    eGFR if non African American 10/18/2021 >60.0  >60 mL/min/1.73 m^2 Final    Cholesterol 10/18/2021 232 (H)  120 - 199 mg/dL Final    Triglycerides 10/18/2021 437 (H)  30 - 150 mg/dL Final    HDL 10/18/2021 44  40 - 75 mg/dL Final    LDL Cholesterol 10/18/2021 Invalid, Trig>400.0  63.0 - 159.0 mg/dL Final    HDL/Cholesterol Ratio 10/18/2021 19.0 (L)  20.0 - 50.0 % Final    Total Cholesterol/HDL Ratio 10/18/2021 5.3 (H)  2.0 - 5.0 Final    Non-HDL Cholesterol 10/18/2021 188  mg/dL Final    Cholesterol 10/18/2021 232 (H)  120 - 199 mg/dL Final    Triglycerides 10/18/2021 437 (H)  30 - 150 mg/dL Final    HDL 10/18/2021 44  40 - 75 mg/dL Final    LDL Cholesterol 10/18/2021 Invalid, Trig>400.0  63.0 - 159.0 mg/dL Final    HDL/Cholesterol Ratio 10/18/2021 19.0 (L)  20.0 - 50.0 % Final    Total Cholesterol/HDL Ratio 10/18/2021 5.3 (H)  2.0 - 5.0 Final     Non-HDL Cholesterol 10/18/2021 188  mg/dL Final    Vit D, 25-Hydroxy 10/18/2021 59  30 - 96 ng/mL Final     Assessment:       1. Hyperglycemia    2. Hypokalemia    3. Osteopenia, unspecified location    4. Hyperlipidemia, unspecified hyperlipidemia type    5. Gastroesophageal reflux disease, unspecified whether esophagitis present    6. Skin lesion          Plan:   Recent lab reviewed with elevated glucose and decreased potassium.  Additional lab was ordered today.  She has a past history of hypokalemia.  Discussed increase potassium in the diet.  Dermatology referral.  Start concentric gout +D need to not yet started bisphosphonate treatment.  Follow-up in 6 months      Hyperglycemia  -     Glucose, Fasting; Future; Expected date: 10/17/2022  -     Hemoglobin A1C; Future; Expected date: 10/17/2022    Hypokalemia  -     Potassium; Future; Expected date: 10/17/2022    Osteopenia, unspecified location    Hyperlipidemia, unspecified hyperlipidemia type    Gastroesophageal reflux disease, unspecified whether esophagitis present    Skin lesion    Other orders  -     Influenza - Quadrivalent (Adjuvanted)

## 2022-10-20 ENCOUNTER — PATIENT MESSAGE (OUTPATIENT)
Dept: INTERNAL MEDICINE | Facility: CLINIC | Age: 77
End: 2022-10-20
Payer: MEDICARE

## 2022-10-20 DIAGNOSIS — N39.0 URINARY TRACT INFECTION WITHOUT HEMATURIA, SITE UNSPECIFIED: Primary | ICD-10-CM

## 2022-10-20 RX ORDER — NITROFURANTOIN (MACROCRYSTALS) 100 MG/1
100 CAPSULE ORAL 4 TIMES DAILY
Qty: 28 CAPSULE | Refills: 0 | Status: SHIPPED | OUTPATIENT
Start: 2022-10-20 | End: 2023-04-17

## 2022-10-20 RX ORDER — NITROFURANTOIN (MACROCRYSTALS) 100 MG/1
100 CAPSULE ORAL 4 TIMES DAILY
Qty: 28 CAPSULE | Refills: 0 | Status: SHIPPED | OUTPATIENT
Start: 2022-10-20 | End: 2022-10-20

## 2023-02-17 ENCOUNTER — PES CALL (OUTPATIENT)
Dept: ADMINISTRATIVE | Facility: OTHER | Age: 78
End: 2023-02-17
Payer: MEDICARE

## 2023-04-17 ENCOUNTER — OFFICE VISIT (OUTPATIENT)
Dept: INTERNAL MEDICINE | Facility: CLINIC | Age: 78
End: 2023-04-17
Payer: MEDICARE

## 2023-04-17 VITALS
WEIGHT: 192.69 LBS | SYSTOLIC BLOOD PRESSURE: 130 MMHG | TEMPERATURE: 99 F | DIASTOLIC BLOOD PRESSURE: 82 MMHG | OXYGEN SATURATION: 96 % | BODY MASS INDEX: 30.97 KG/M2 | HEIGHT: 66 IN | RESPIRATION RATE: 20 BRPM | HEART RATE: 78 BPM

## 2023-04-17 DIAGNOSIS — H61.20 IMPACTED CERUMEN, UNSPECIFIED LATERALITY: ICD-10-CM

## 2023-04-17 DIAGNOSIS — L40.50 PSORIATIC ARTHRITIS: ICD-10-CM

## 2023-04-17 DIAGNOSIS — E87.6 HYPOKALEMIA: ICD-10-CM

## 2023-04-17 DIAGNOSIS — R60.9 EDEMA, UNSPECIFIED TYPE: Primary | ICD-10-CM

## 2023-04-17 PROCEDURE — 99999 PR PBB SHADOW E&M-EST. PATIENT-LVL III: CPT | Mod: PBBFAC,,, | Performed by: FAMILY MEDICINE

## 2023-04-17 PROCEDURE — 99214 PR OFFICE/OUTPT VISIT, EST, LEVL IV, 30-39 MIN: ICD-10-PCS | Mod: S$PBB,,, | Performed by: FAMILY MEDICINE

## 2023-04-17 PROCEDURE — 99214 OFFICE O/P EST MOD 30 MIN: CPT | Mod: S$PBB,,, | Performed by: FAMILY MEDICINE

## 2023-04-17 PROCEDURE — 99213 OFFICE O/P EST LOW 20 MIN: CPT | Mod: PBBFAC | Performed by: FAMILY MEDICINE

## 2023-04-17 PROCEDURE — 99999 PR PBB SHADOW E&M-EST. PATIENT-LVL III: ICD-10-PCS | Mod: PBBFAC,,, | Performed by: FAMILY MEDICINE

## 2023-04-17 RX ORDER — FLUCONAZOLE 150 MG/1
150 TABLET ORAL DAILY
Qty: 1 TABLET | Refills: 0 | Status: SHIPPED | OUTPATIENT
Start: 2023-04-17 | End: 2023-04-18

## 2023-04-17 RX ORDER — POTASSIUM CHLORIDE 750 MG/1
10 CAPSULE, EXTENDED RELEASE ORAL DAILY
Qty: 90 CAPSULE | Refills: 3 | Status: SHIPPED | OUTPATIENT
Start: 2023-04-17 | End: 2023-07-17 | Stop reason: SDUPTHER

## 2023-04-17 RX ORDER — MELOXICAM 7.5 MG/1
7.5 TABLET ORAL DAILY
Qty: 90 TABLET | Refills: 1
Start: 2023-04-17 | End: 2023-07-17

## 2023-04-17 RX ORDER — HYDROCHLOROTHIAZIDE 25 MG/1
25 TABLET ORAL DAILY
Qty: 90 TABLET | Refills: 3 | Status: SHIPPED | OUTPATIENT
Start: 2023-04-17 | End: 2024-03-27

## 2023-04-17 NOTE — PROGRESS NOTES
Subjective:       Patient ID: Teresa Orozco is a 77 y.o. female.    Chief Complaint: Follow-up (X 6 months)    Follow-up edema hypokalemia yeast vaginitis osteoarthritis the knees cerumen impaction.  She denies headache chest pain palpitations shortness of breath or edema.  Her potassium has been low for a period of time.  She is on HCTZ 25 mg a day.  She would like Diflucan for recent yeast infection.  She is now off tamoxifen.  She feels that her joint pain has improved.  She still does have some knee discomfort.  She saw Rheumatology prescribe meloxicam 7.5 which is helping she has chronic cerumen impaction.  She is scheduled for ENT earwax removal.    Follow-up  Associated symptoms include arthralgias, joint swelling and neck pain. Pertinent negatives include no chest pain, chills, congestion, coughing, fever, headaches, vomiting or weakness.   Review of Systems   Constitutional:  Negative for activity change, chills, fever and unexpected weight change.   HENT:  Positive for hearing loss. Negative for congestion, rhinorrhea and trouble swallowing.    Eyes:  Negative for discharge and visual disturbance.   Respiratory:  Negative for cough, chest tightness, shortness of breath and wheezing.    Cardiovascular:  Negative for chest pain, palpitations and leg swelling.   Gastrointestinal:  Negative for abdominal distention, blood in stool, constipation, diarrhea and vomiting.   Endocrine: Negative for polydipsia and polyuria.   Genitourinary:  Positive for vaginal discharge. Negative for difficulty urinating, dysuria, hematuria and menstrual problem.   Musculoskeletal:  Positive for arthralgias, joint swelling and neck pain.   Neurological:  Negative for dizziness, weakness, light-headedness and headaches.   Psychiatric/Behavioral:  Negative for confusion and dysphoric mood.      Objective:      Physical Exam  Constitutional:       General: She is not in acute distress.     Appearance: She is not ill-appearing or  diaphoretic.   Cardiovascular:      Rate and Rhythm: Normal rate and regular rhythm.      Heart sounds: No murmur heard.    No gallop.   Pulmonary:      Effort: Pulmonary effort is normal. No respiratory distress.      Breath sounds: No wheezing, rhonchi or rales.   Abdominal:      General: There is no distension.      Palpations: Abdomen is soft.   Lymphadenopathy:      Cervical: No cervical adenopathy.   Skin:     General: Skin is warm and dry.      Coloration: Skin is not pale.      Findings: No erythema.   Neurological:      Mental Status: She is alert.       Lab Visit on 10/17/2022   Component Date Value Ref Range Status    Glucose, Fasting 10/17/2022 107  70 - 110 mg/dL Final    Hemoglobin A1C 10/17/2022 5.7 (H)  4.0 - 5.6 % Final    Estimated Avg Glucose 10/17/2022 117  68 - 131 mg/dL Final    Potassium 10/17/2022 3.4 (L)  3.5 - 5.1 mmol/L Final     Assessment:       1. Edema, unspecified type        Plan:    Edema improved with HCTZ 25 mg and add Micro-K 10 daily.  Diflucan 1 dose for yeast infection ENT follow-up for cerumen impaction.  Follow-up in 3 months.  Recheck potassium level on return.  Rheumatology evaluation for probable psoriatic arthritis was done      Edema, unspecified type  -     hydroCHLOROthiazide (HYDRODIURIL) 25 MG tablet; Take 1 tablet (25 mg total) by mouth once daily.  Dispense: 90 tablet; Refill: 3    Other orders  -     meloxicam (MOBIC) 7.5 MG tablet; Take 1 tablet (7.5 mg total) by mouth once daily.  Dispense: 90 tablet; Refill: 1  -     fluconazole (DIFLUCAN) 150 MG Tab; Take 1 tablet (150 mg total) by mouth once daily. for 1 day  Dispense: 1 tablet; Refill: 0  -     potassium chloride (MICRO-K) 10 MEQ CpSR; Take 1 capsule (10 mEq total) by mouth once daily.  Dispense: 90 capsule; Refill: 3

## 2023-05-02 ENCOUNTER — OFFICE VISIT (OUTPATIENT)
Dept: OTOLARYNGOLOGY | Facility: CLINIC | Age: 78
End: 2023-05-02
Payer: MEDICARE

## 2023-05-02 VITALS — BODY MASS INDEX: 31.97 KG/M2 | TEMPERATURE: 98 F | WEIGHT: 195.13 LBS

## 2023-05-02 DIAGNOSIS — H61.21 IMPACTED CERUMEN, RIGHT EAR: Primary | ICD-10-CM

## 2023-05-02 DIAGNOSIS — H90.3 SENSORINEURAL HEARING LOSS (SNHL) OF BOTH EARS: ICD-10-CM

## 2023-05-02 DIAGNOSIS — H93.13 TINNITUS OF BOTH EARS: ICD-10-CM

## 2023-05-02 PROCEDURE — 99213 OFFICE O/P EST LOW 20 MIN: CPT | Mod: PBBFAC | Performed by: PHYSICIAN ASSISTANT

## 2023-05-02 PROCEDURE — 69210 REMOVE IMPACTED EAR WAX UNI: CPT | Mod: PBBFAC | Performed by: PHYSICIAN ASSISTANT

## 2023-05-02 PROCEDURE — 69210 PR REMOVAL IMPACTED CERUMEN REQUIRING INSTRUMENTATION, UNILATERAL: ICD-10-PCS | Mod: S$PBB,,, | Performed by: PHYSICIAN ASSISTANT

## 2023-05-02 PROCEDURE — 99999 PR PBB SHADOW E&M-EST. PATIENT-LVL III: ICD-10-PCS | Mod: PBBFAC,,, | Performed by: PHYSICIAN ASSISTANT

## 2023-05-02 PROCEDURE — 99999 PR PBB SHADOW E&M-EST. PATIENT-LVL III: CPT | Mod: PBBFAC,,, | Performed by: PHYSICIAN ASSISTANT

## 2023-05-02 PROCEDURE — 69210 REMOVE IMPACTED EAR WAX UNI: CPT | Mod: S$PBB,,, | Performed by: PHYSICIAN ASSISTANT

## 2023-05-02 PROCEDURE — 99213 OFFICE O/P EST LOW 20 MIN: CPT | Mod: 25,S$PBB,, | Performed by: PHYSICIAN ASSISTANT

## 2023-05-02 PROCEDURE — 99213 PR OFFICE/OUTPT VISIT, EST, LEVL III, 20-29 MIN: ICD-10-PCS | Mod: 25,S$PBB,, | Performed by: PHYSICIAN ASSISTANT

## 2023-05-02 NOTE — PROGRESS NOTES
Subjective     Patient ID: Teresa Orozco is a 77 y.o. female.    Chief Complaint: Cerumen Impaction    Patient is a very pleasant 77 year old female here to see me today for possible cerumen impactions and tinnitus.  She has noted gradual decline in hearing AU since her last audiogram in 2020.  She has tinnitus AU which she reports now sounds more like a scratching sound versus crickets.  Not pulsatile.  She has not had any recent ear pain or drainage.  She has not used any ear drops.  She has had issues with cerumen impactions in the past.   She recently completed Tamoxifen therapy.  She also wanted to have her tongue checked.  She's had a coating on her tongue for years.  No associated pain or ulceration.  No bleeding.  She has used a tongue scraper but coating returns.  She has tried Diflucan in past with no change; has also been treated for oral thrush in the past by her dentist (?Nystatin).  She does not smoke.  Does not use any mouthwash currently.    Review of Systems   Constitutional: Negative.  Negative for fever.   HENT:  Positive for hearing loss, postnasal drip and tinnitus. Negative for ear discharge, ear pain, sore throat and trouble swallowing.    Eyes: Negative.    Respiratory: Negative.     Cardiovascular: Negative.    Gastrointestinal: Negative.    Endocrine: Negative.    Genitourinary: Negative.    Musculoskeletal:  Positive for back pain and neck pain.   Integumentary:  Negative.   Allergic/Immunologic: Positive for food allergies.   Neurological:  Positive for dizziness and light-headedness.   Hematological: Negative.    Psychiatric/Behavioral: Negative.          Objective     Physical Exam  Constitutional:       Appearance: Normal appearance.   HENT:      Head: Normocephalic and atraumatic.      Jaw: No trismus.      Right Ear: Ear canal and external ear normal. Decreased hearing noted. There is impacted cerumen (removal described below).      Left Ear: Ear canal and external ear normal.  Decreased hearing noted. There is impacted cerumen.      Nose: Nose normal.      Mouth/Throat:      Mouth: Mucous membranes are moist.      Tongue: No lesions. Tongue does not deviate from midline.      Comments: Tongue soft to palpation;  thin white coating on posterior 1/3 of tongue that comes off when scraped with tongue blade; no bleeding or ulceration  Pulmonary:      Effort: Pulmonary effort is normal.   Neurological:      General: No focal deficit present.      Mental Status: She is alert.   Psychiatric:         Behavior: Behavior is cooperative.           Procedure Note    CHIEF COMPLAINT:  Cerumen Impaction    Description:  The patient was seated in an exam chair.  An ear speculum was placed in the right EAC and was examined under the microscope.  Alligator forceps were used to remove a large cerumen impaction.  The tympanic membrane was visualized and was normal in appearance.  The procedure was repeated on the left side in a similar fashion (tiny amount of cerumen).  The TM was intact and normal on this side as well.  The patient tolerated the procedure well.                       Assessment and Plan     Problem List Items Addressed This Visit    None  Visit Diagnoses       Impacted cerumen, right ear    -  Primary    Tinnitus of both ears        Sensorineural hearing loss (SNHL) of both ears                We reviewed her audiogram from 2020 together in detail.  I recommend scheduling repeat audiogram for comparison when motivated.  She prefers to call back to schedule.   We also discussed that tinnitus is most often caused by a hearing loss, and that as the hair cells are damaged, either genetic or as a result of loud noise exposure, they then cause tinnitus.  Some patients find that restricting the salt or caffeine in their diet helps, and there is also an OTC supplement, lipoflavinoids, that some people find to be effective though their benefit is not fully proven.  Tinnitus tends to be louder in  times of stress and fatigue, and may decrease with time.  Sound machines may also be an effective masking technique if needed at night.      Cerumen impaction:  Removed today without difficulty.  I would recommend the use of a wax softening drop, either over the counter Debrox or mineral oil, on a weekly basis.  I also instructed the patient to avoid Qtips.    Reassured patient that her tongue appears normal today.  Recommend she continue with routine dental exams and use of tongue scraper as well.  No signs of oral thrush on exam today.  RTC with any new or worsening symptoms.

## 2023-07-17 ENCOUNTER — LAB VISIT (OUTPATIENT)
Dept: LAB | Facility: HOSPITAL | Age: 78
End: 2023-07-17
Attending: FAMILY MEDICINE
Payer: MEDICARE

## 2023-07-17 ENCOUNTER — OFFICE VISIT (OUTPATIENT)
Dept: INTERNAL MEDICINE | Facility: CLINIC | Age: 78
End: 2023-07-17
Payer: MEDICARE

## 2023-07-17 VITALS
HEART RATE: 82 BPM | SYSTOLIC BLOOD PRESSURE: 130 MMHG | OXYGEN SATURATION: 96 % | HEIGHT: 66 IN | WEIGHT: 197.56 LBS | TEMPERATURE: 97 F | DIASTOLIC BLOOD PRESSURE: 82 MMHG | RESPIRATION RATE: 18 BRPM | BODY MASS INDEX: 31.75 KG/M2

## 2023-07-17 DIAGNOSIS — E87.6 HYPOKALEMIA: ICD-10-CM

## 2023-07-17 DIAGNOSIS — R60.9 EDEMA, UNSPECIFIED TYPE: ICD-10-CM

## 2023-07-17 DIAGNOSIS — M19.90 ARTHRITIS: Primary | ICD-10-CM

## 2023-07-17 DIAGNOSIS — E78.5 HYPERLIPIDEMIA, UNSPECIFIED HYPERLIPIDEMIA TYPE: ICD-10-CM

## 2023-07-17 LAB
CHOLEST SERPL-MCNC: 176 MG/DL (ref 120–199)
CHOLEST/HDLC SERPL: 3.9 {RATIO} (ref 2–5)
HDLC SERPL-MCNC: 45 MG/DL (ref 40–75)
HDLC SERPL: 25.6 % (ref 20–50)
LDLC SERPL CALC-MCNC: 84.2 MG/DL (ref 63–159)
NONHDLC SERPL-MCNC: 131 MG/DL
POTASSIUM SERPL-SCNC: 3.4 MMOL/L (ref 3.5–5.1)
TRIGL SERPL-MCNC: 234 MG/DL (ref 30–150)

## 2023-07-17 PROCEDURE — 99214 OFFICE O/P EST MOD 30 MIN: CPT | Mod: PBBFAC | Performed by: FAMILY MEDICINE

## 2023-07-17 PROCEDURE — 84132 ASSAY OF SERUM POTASSIUM: CPT | Performed by: FAMILY MEDICINE

## 2023-07-17 PROCEDURE — 99999 PR PBB SHADOW E&M-EST. PATIENT-LVL IV: ICD-10-PCS | Mod: PBBFAC,,, | Performed by: FAMILY MEDICINE

## 2023-07-17 PROCEDURE — 80061 LIPID PANEL: CPT | Performed by: FAMILY MEDICINE

## 2023-07-17 PROCEDURE — 99214 PR OFFICE/OUTPT VISIT, EST, LEVL IV, 30-39 MIN: ICD-10-PCS | Mod: S$PBB,,, | Performed by: FAMILY MEDICINE

## 2023-07-17 PROCEDURE — 99214 OFFICE O/P EST MOD 30 MIN: CPT | Mod: S$PBB,,, | Performed by: FAMILY MEDICINE

## 2023-07-17 PROCEDURE — 36415 COLL VENOUS BLD VENIPUNCTURE: CPT | Performed by: FAMILY MEDICINE

## 2023-07-17 PROCEDURE — 99999 PR PBB SHADOW E&M-EST. PATIENT-LVL IV: CPT | Mod: PBBFAC,,, | Performed by: FAMILY MEDICINE

## 2023-07-17 RX ORDER — POTASSIUM CHLORIDE 750 MG/1
20 CAPSULE, EXTENDED RELEASE ORAL DAILY
Qty: 90 CAPSULE | Refills: 3
Start: 2023-07-17 | End: 2023-08-01

## 2023-07-17 NOTE — PROGRESS NOTES
Follow-up edema hypokalemia joint pain.  She has ongoing fluid retention.  She reports follow low-sodium diet.  She occasionally takes meloxicam for joint pain but regular basis.  She gained 5 lb of weight since last visit.  Blood pressure is normal.  She is been diagnosed with psoriasis.  This was controlled with clobetasol cream.  She was evaluated by Rheumatology some years ago.  She would like a rheumatology follow-up.  She is generalized joint pain including shoulders hips knees.  She was hoping this would improve after stopping tamoxifen but it has not.  She is currently Micro-K 10 once a day.  Potassium 2 weeks ago was still low.  Subjective:       Patient ID: Teresa Orozco is a 77 y.o. female.    Chief Complaint: Follow-up    Follow-up  Associated symptoms include arthralgias and a rash. Pertinent negatives include no abdominal pain, chest pain, chills, coughing, fever, headaches or weakness.   Review of Systems   Constitutional:  Negative for chills and fever.   Respiratory:  Negative for cough, chest tightness, shortness of breath and wheezing.    Cardiovascular:  Positive for leg swelling. Negative for chest pain and palpitations.   Gastrointestinal:  Negative for abdominal distention and abdominal pain.   Musculoskeletal:  Positive for arthralgias.   Skin:  Positive for rash.   Neurological:  Negative for dizziness, weakness, light-headedness and headaches.     Objective:      Physical Exam  Constitutional:       General: She is not in acute distress.     Appearance: She is not ill-appearing or diaphoretic.   Cardiovascular:      Rate and Rhythm: Normal rate and regular rhythm.      Heart sounds: No murmur heard.    No gallop.   Pulmonary:      Effort: Pulmonary effort is normal. No respiratory distress.      Breath sounds: No wheezing, rhonchi or rales.   Abdominal:      General: There is no distension.      Palpations: Abdomen is soft.   Lymphadenopathy:      Cervical: No cervical adenopathy.   Skin:      General: Skin is warm and dry.      Coloration: Skin is not pale.      Findings: No erythema.   Neurological:      Mental Status: She is alert.   Psychiatric:         Mood and Affect: Mood normal.         Behavior: Behavior normal.       Lab Visit on 10/17/2022   Component Date Value Ref Range Status    Glucose, Fasting 10/17/2022 107  70 - 110 mg/dL Final    Hemoglobin A1C 10/17/2022 5.7 (H)  4.0 - 5.6 % Final    Estimated Avg Glucose 10/17/2022 117  68 - 131 mg/dL Final    Potassium 10/17/2022 3.4 (L)  3.5 - 5.1 mmol/L Final     Assessment:       1. Arthritis    2. Hypokalemia    3. Hyperlipidemia, unspecified hyperlipidemia type    4. Edema, unspecified type        Plan:     Lab was ordered to check potassium lipids increase Micro-K 10 and take 2 a day.  Follow-up in 6 weeks.  Avoid salt.    Arthritis  -     Ambulatory referral/consult to Rheumatology; Future; Expected date: 07/24/2023    Hypokalemia  -     Potassium; Future; Expected date: 07/17/2023    Hyperlipidemia, unspecified hyperlipidemia type  -     Lipid Panel; Future; Expected date: 07/17/2023    Edema, unspecified type    Other orders  -     potassium chloride (MICRO-K) 10 MEQ CpSR; Take 2 capsules (20 mEq total) by mouth once daily.  Dispense: 90 capsule; Refill: 3

## 2023-08-01 ENCOUNTER — PATIENT MESSAGE (OUTPATIENT)
Dept: INTERNAL MEDICINE | Facility: CLINIC | Age: 78
End: 2023-08-01
Payer: MEDICARE

## 2023-08-01 DIAGNOSIS — E87.6 HYPOKALEMIA: Primary | ICD-10-CM

## 2023-08-01 RX ORDER — POTASSIUM CHLORIDE 20 MEQ/1
20 TABLET, EXTENDED RELEASE ORAL DAILY
Qty: 90 TABLET | Refills: 3 | Status: SHIPPED | OUTPATIENT
Start: 2023-08-01

## 2023-08-28 ENCOUNTER — OFFICE VISIT (OUTPATIENT)
Dept: INTERNAL MEDICINE | Facility: CLINIC | Age: 78
End: 2023-08-28
Payer: MEDICARE

## 2023-08-28 ENCOUNTER — LAB VISIT (OUTPATIENT)
Dept: LAB | Facility: HOSPITAL | Age: 78
End: 2023-08-28
Payer: MEDICARE

## 2023-08-28 VITALS
HEIGHT: 66 IN | SYSTOLIC BLOOD PRESSURE: 130 MMHG | HEART RATE: 71 BPM | WEIGHT: 194.44 LBS | TEMPERATURE: 98 F | DIASTOLIC BLOOD PRESSURE: 78 MMHG | RESPIRATION RATE: 18 BRPM | BODY MASS INDEX: 31.25 KG/M2 | OXYGEN SATURATION: 97 %

## 2023-08-28 DIAGNOSIS — R73.03 PREDIABETES: ICD-10-CM

## 2023-08-28 DIAGNOSIS — R60.9 EDEMA, UNSPECIFIED TYPE: ICD-10-CM

## 2023-08-28 DIAGNOSIS — N76.0 ACUTE VAGINITIS: ICD-10-CM

## 2023-08-28 DIAGNOSIS — E66.9 OBESITY (BMI 30-39.9): ICD-10-CM

## 2023-08-28 DIAGNOSIS — E87.6 HYPOKALEMIA: Primary | ICD-10-CM

## 2023-08-28 DIAGNOSIS — E87.6 HYPOKALEMIA: ICD-10-CM

## 2023-08-28 LAB
ALBUMIN SERPL BCP-MCNC: 3.5 G/DL (ref 3.5–5.2)
ALP SERPL-CCNC: 89 U/L (ref 55–135)
ALT SERPL W/O P-5'-P-CCNC: 18 U/L (ref 10–44)
ANION GAP SERPL CALC-SCNC: 13 MMOL/L (ref 8–16)
AST SERPL-CCNC: 24 U/L (ref 10–40)
BASOPHILS # BLD AUTO: 0.06 K/UL (ref 0–0.2)
BASOPHILS NFR BLD: 0.6 % (ref 0–1.9)
BILIRUB SERPL-MCNC: 0.5 MG/DL (ref 0.1–1)
BUN SERPL-MCNC: 9 MG/DL (ref 8–23)
CALCIUM SERPL-MCNC: 9.6 MG/DL (ref 8.7–10.5)
CHLORIDE SERPL-SCNC: 101 MMOL/L (ref 95–110)
CO2 SERPL-SCNC: 26 MMOL/L (ref 23–29)
CREAT SERPL-MCNC: 0.8 MG/DL (ref 0.5–1.4)
DIFFERENTIAL METHOD: ABNORMAL
EOSINOPHIL # BLD AUTO: 0.3 K/UL (ref 0–0.5)
EOSINOPHIL NFR BLD: 2.5 % (ref 0–8)
ERYTHROCYTE [DISTWIDTH] IN BLOOD BY AUTOMATED COUNT: 14 % (ref 11.5–14.5)
EST. GFR  (NO RACE VARIABLE): >60 ML/MIN/1.73 M^2
ESTIMATED AVG GLUCOSE: 120 MG/DL (ref 68–131)
GLUCOSE SERPL-MCNC: 107 MG/DL (ref 70–110)
HBA1C MFR BLD: 5.8 % (ref 4–5.6)
HCT VFR BLD AUTO: 41.8 % (ref 37–48.5)
HGB BLD-MCNC: 13.3 G/DL (ref 12–16)
IMM GRANULOCYTES # BLD AUTO: 0.04 K/UL (ref 0–0.04)
IMM GRANULOCYTES NFR BLD AUTO: 0.4 % (ref 0–0.5)
LYMPHOCYTES # BLD AUTO: 2.4 K/UL (ref 1–4.8)
LYMPHOCYTES NFR BLD: 24 % (ref 18–48)
MCH RBC QN AUTO: 29.2 PG (ref 27–31)
MCHC RBC AUTO-ENTMCNC: 31.8 G/DL (ref 32–36)
MCV RBC AUTO: 92 FL (ref 82–98)
MONOCYTES # BLD AUTO: 0.8 K/UL (ref 0.3–1)
MONOCYTES NFR BLD: 8 % (ref 4–15)
NEUTROPHILS # BLD AUTO: 6.5 K/UL (ref 1.8–7.7)
NEUTROPHILS NFR BLD: 64.5 % (ref 38–73)
NRBC BLD-RTO: 0 /100 WBC
PLATELET # BLD AUTO: 328 K/UL (ref 150–450)
PMV BLD AUTO: 11.4 FL (ref 9.2–12.9)
POTASSIUM SERPL-SCNC: 4.1 MMOL/L (ref 3.5–5.1)
PROT SERPL-MCNC: 6.8 G/DL (ref 6–8.4)
RBC # BLD AUTO: 4.55 M/UL (ref 4–5.4)
SODIUM SERPL-SCNC: 140 MMOL/L (ref 136–145)
WBC # BLD AUTO: 10.01 K/UL (ref 3.9–12.7)

## 2023-08-28 PROCEDURE — 99214 OFFICE O/P EST MOD 30 MIN: CPT | Mod: PBBFAC

## 2023-08-28 PROCEDURE — 99999 PR PBB SHADOW E&M-EST. PATIENT-LVL IV: ICD-10-PCS | Mod: PBBFAC,,,

## 2023-08-28 PROCEDURE — 99999 PR PBB SHADOW E&M-EST. PATIENT-LVL IV: CPT | Mod: PBBFAC,,,

## 2023-08-28 PROCEDURE — 80053 COMPREHEN METABOLIC PANEL: CPT

## 2023-08-28 PROCEDURE — 99214 PR OFFICE/OUTPT VISIT, EST, LEVL IV, 30-39 MIN: ICD-10-PCS | Mod: S$PBB,,,

## 2023-08-28 PROCEDURE — 83036 HEMOGLOBIN GLYCOSYLATED A1C: CPT

## 2023-08-28 PROCEDURE — 36415 COLL VENOUS BLD VENIPUNCTURE: CPT

## 2023-08-28 PROCEDURE — 99214 OFFICE O/P EST MOD 30 MIN: CPT | Mod: S$PBB,,,

## 2023-08-28 PROCEDURE — 85025 COMPLETE CBC W/AUTO DIFF WBC: CPT

## 2023-08-28 RX ORDER — FLUCONAZOLE 150 MG/1
150 TABLET ORAL DAILY
Qty: 2 TABLET | Refills: 0 | Status: SHIPPED | OUTPATIENT
Start: 2023-08-28 | End: 2023-08-30

## 2023-08-28 NOTE — PROGRESS NOTES
Teresa Orozco  08/28/2023  [id number]    Jaiden Joshi MD  Patient Care Team:  Jaiden Joshi MD as PCP - General (Family Medicine)  Hank Escoto MD as Consulting Physician (Gastroenterology)  Teresa Spicer as Consulting Physician (Surgery)  Kristal Bucio MD as Consulting Physician (Internal Medicine)  Dorina Henley MD as Consulting Physician (Ophthalmology)  Norberto Moore MD as Consulting Physician (Ophthalmology)          Visit Type:a scheduled routine follow-up visit    Chief Complaint:  Chief Complaint   Patient presents with    Follow-up        History of Present Illness:    77 year old female presents today for 6 week follow up  Labs in July showed potassium 3.2  Started on daily 20 meQ supplement    At last visit had some problems with edema  Discussed watching salt in diet   Swelling has improved        History:  Past Medical History:   Diagnosis Date    Anxiety     Arthritis     Breast cancer 2008    Depression     Fluid retention     Headache     Hyperlipidemia     IBS (irritable bowel syndrome)     Inflammatory bowel disease     lymphocitic colitis s/p radiation    Low back pain, non-specific 10/2/2019    Lymphocytic colitis 2008    in remission    Obesity     Pneumonia     around age 42, did not require hospitalization    Vertigo     intermittent     Past Surgical History:   Procedure Laterality Date    ADENOIDECTOMY  1951    APPENDECTOMY      BREAST SURGERY Bilateral 03/2018    bilateral mastectomy    CHOLECYSTECTOMY  2002    COLON SURGERY      colonoscopy, polyp removed    COLONOSCOPY W/ BIOPSIES AND POLYPECTOMY  07/2011    EYE SURGERY      cataracts    HYSTERECTOMY      OOPHORECTOMY Left     TONSILLECTOMY      TUBAL LIGATION       Family History   Problem Relation Age of Onset    Cancer Mother     Parkinsonism Mother     Cancer Father         prostate    Heart disease Father     Arthritis Father     No Known Problems Sister     Diabetes Brother          currently controlled through diet    Cancer Brother         prostate    No Known Problems Son     Thyroid disease Neg Hx     Migraines Neg Hx     Asthma Neg Hx      Social History     Socioeconomic History    Marital status:     Number of children: 2   Tobacco Use    Smoking status: Former     Current packs/day: 0.00     Average packs/day: 1 pack/day for 5.6 years (5.6 ttl pk-yrs)     Types: Cigarettes     Start date: 1968     Quit date: 1973     Years since quittin.0    Smokeless tobacco: Never   Substance and Sexual Activity    Alcohol use: No    Drug use: Never    Sexual activity: Yes     Partners: Male     Birth control/protection: Post-menopausal, See Surgical Hx     Social Determinants of Health     Financial Resource Strain: Low Risk  (2023)    Overall Financial Resource Strain (CARDIA)     Difficulty of Paying Living Expenses: Not hard at all   Food Insecurity: No Food Insecurity (2023)    Hunger Vital Sign     Worried About Running Out of Food in the Last Year: Never true     Ran Out of Food in the Last Year: Never true   Transportation Needs: No Transportation Needs (2023)    PRAPARE - Transportation     Lack of Transportation (Medical): No     Lack of Transportation (Non-Medical): No   Physical Activity: Inactive (2023)    Exercise Vital Sign     Days of Exercise per Week: 0 days     Minutes of Exercise per Session: 0 min   Stress: No Stress Concern Present (2023)    [employer] - Occupational Stress Questionnaire     Feeling of Stress : Not at all   Social Connections: Unknown (2023)    Social Connection and Isolation Panel [NHANES]     Frequency of Communication with Friends and Family: Once a week     Frequency of Social Gatherings with Friends and Family: Once a week     Active Member of Clubs or Organizations: No     Attends Club or Organization Meetings: Never     Marital Status:    Housing Stability: Low Risk   (8/25/2023)    Housing Stability Vital Sign     Unable to Pay for Housing in the Last Year: No     Number of Places Lived in the Last Year: 1     Unstable Housing in the Last Year: No     Patient Active Problem List   Diagnosis    IBS (irritable bowel syndrome)    Vertigo    Hyperlipidemia    Headache    Anxiety    Fluid retention    Bilateral malignant neoplasm of breast in female, estrogen receptor positive    Lymphocytic colitis    Arthritis    Chronic pain of both knees    History of migraine headaches    Edema    Hypokalemia    Migraine without status migrainosus, not intractable    Vitamin D deficiency disease    Low back pain, non-specific    Arthralgia    Fullness of supraclavicular fossa    Hearing loss of right ear    Other spondylosis, cervical region    Cervical disc disorder at C5-C6 level with myelopathy    Chondritis    Closed fracture of multiple ribs of left side with routine healing    Asymptomatic menopausal state     Osteoporosis, post-menopausal    Decreased hearing of both ears    Osteopenia    Hyperglycemia    Gastroesophageal reflux disease    Skin lesion    Psoriatic arthritis    Impacted cerumen     Review of patient's allergies indicates:   Allergen Reactions    Adhesive tape-silicones Blisters and Dermatitis    Berry flavor      Can't eat any berries except strawberries. Strawberries does not cause any problems.    Demerol [meperidine]     Pantoprazole      Face flush/ tight chest    Shellfish containing products        The following were reviewed at this visit: active problem list, medication list, allergies, family history, social history, and health maintenance.    Medications:  Current Outpatient Medications on File Prior to Visit   Medication Sig Dispense Refill    acetaminophen (TYLENOL) 325 MG tablet Take 325 mg by mouth as needed.       atorvastatin (LIPITOR) 20 MG tablet TAKE 1 TABLET ONCE DAILY 90 tablet 1    C/sourcherry/celery/grape seed (TART CHERRY ORAL) Take by mouth.       esomeprazole (NEXIUM) 40 MG capsule Take 40 mg by mouth once daily.      flaxseed oiL Oil by Misc.(Non-Drug; Combo Route) route.      fluticasone propionate (FLONASE) 50 mcg/actuation nasal spray USE 2 SPRAYS IN EACH       NOSTRIL ONCE DAILY 48 g 3    hydroCHLOROthiazide (HYDRODIURIL) 25 MG tablet Take 1 tablet (25 mg total) by mouth once daily. 90 tablet 3    multivitamin capsule Take 1 capsule by mouth once daily.      potassium chloride SA (K-DUR,KLOR-CON) 20 MEQ tablet Take 1 tablet (20 mEq total) by mouth once daily. 90 tablet 3    propranoloL (INDERAL LA) 120 MG 24 hr capsule Take 1 capsule (120 mg total) by mouth once daily. 90 capsule 3     No current facility-administered medications on file prior to visit.       Medications have been reviewed and reconciled with patient at this visit.  Barriers to medications reviewed with patient.    Adverse reactions to current medications reviewed with patient..    Over the counter medications reviewed and reconciled with patient.    Exam:  Wt Readings from Last 3 Encounters:   07/17/23 89.6 kg (197 lb 8.5 oz)   05/02/23 88.5 kg (195 lb 1.7 oz)   04/17/23 87.4 kg (192 lb 10.9 oz)     Temp Readings from Last 3 Encounters:   07/17/23 97.2 °F (36.2 °C) (Tympanic)   05/02/23 98 °F (36.7 °C) (Temporal)   04/17/23 99 °F (37.2 °C) (Tympanic)     BP Readings from Last 3 Encounters:   07/17/23 130/82   04/17/23 130/82   10/17/22 128/86     Pulse Readings from Last 3 Encounters:   07/17/23 82   04/17/23 78   10/17/22 63     There is no height or weight on file to calculate BMI.      Review of Systems   Cardiovascular:  Negative for chest pain and palpitations.   Musculoskeletal:  Positive for joint pain.     Physical Exam  Vitals and nursing note reviewed.   Constitutional:       General: She is not in acute distress.     Appearance: She is well-developed. She is not diaphoretic.   HENT:      Head: Normocephalic and atraumatic.      Right Ear: Tympanic membrane and external ear  normal.      Left Ear: Tympanic membrane and external ear normal.   Eyes:      General:         Right eye: No discharge.         Left eye: No discharge.      Conjunctiva/sclera: Conjunctivae normal.      Pupils: Pupils are equal, round, and reactive to light.   Cardiovascular:      Rate and Rhythm: Normal rate and regular rhythm.      Heart sounds: Normal heart sounds. No murmur heard.  Pulmonary:      Effort: Pulmonary effort is normal. No respiratory distress.      Breath sounds: Normal breath sounds. No wheezing.   Neurological:      Mental Status: She is alert and oriented to person, place, and time.   Psychiatric:         Behavior: Behavior normal.         Thought Content: Thought content normal.         Judgment: Judgment normal.         Laboratory Reviewed ({Yes)  Lab Results   Component Value Date    WBC 7.80 09/11/2020    HGB 13.6 09/11/2020    HCT 42.9 09/11/2020     09/11/2020    CHOL 176 07/17/2023    TRIG 234 (H) 07/17/2023    HDL 45 07/17/2023    ALT 23 10/18/2021    AST 24 10/18/2021     10/18/2021    K 3.4 (L) 07/17/2023    CL 99 10/18/2021    CREATININE 0.7 10/18/2021    BUN 13 10/18/2021    CO2 26 10/18/2021    TSH 2.085 10/02/2019    GLUF 107 10/17/2022    HGBA1C 5.7 (H) 10/17/2022       Teresa was seen today for follow-up.    Diagnoses and all orders for this visit:    Hypokalemia  -     COMPREHENSIVE METABOLIC PANEL; Future    Edema, unspecified type  -     COMPREHENSIVE METABOLIC PANEL; Future  -     CBC Auto Differential; Future    Prediabetes  -     COMPREHENSIVE METABOLIC PANEL; Future  -     HEMOGLOBIN A1C; Future    Obesity (BMI 30-39.9)  Encouraged healthy diet and exercise as tolerated to help bring BMI into normal range.      Acute vaginitis  -     fluconazole (DIFLUCAN) 150 MG Tab; Take 1 tablet (150 mg total) by mouth once daily. for 2 days      Taking antibiotic for dental infection  Discussed daily probiotic    Labs today  Will schedule a follow up appointment with   Madelyn in 6 months        Care Plan/Goals: Reviewed    Goals    None         Follow up: No follow-ups on file.    After visit summary was printed and given to patient upon discharge today.  Patient goals and care plan are included in After Visit Summary.

## 2023-09-05 RX ORDER — FLUTICASONE PROPIONATE 50 MCG
2 SPRAY, SUSPENSION (ML) NASAL DAILY
Qty: 48 G | Refills: 3 | Status: SHIPPED | OUTPATIENT
Start: 2023-09-05

## 2023-09-14 ENCOUNTER — OFFICE VISIT (OUTPATIENT)
Dept: INTERNAL MEDICINE | Facility: CLINIC | Age: 78
End: 2023-09-14
Payer: MEDICARE

## 2023-09-14 VITALS
BODY MASS INDEX: 33.21 KG/M2 | HEART RATE: 88 BPM | SYSTOLIC BLOOD PRESSURE: 130 MMHG | WEIGHT: 199.31 LBS | HEIGHT: 65 IN | OXYGEN SATURATION: 96 % | DIASTOLIC BLOOD PRESSURE: 80 MMHG | TEMPERATURE: 99 F

## 2023-09-14 DIAGNOSIS — E78.5 HYPERLIPIDEMIA, UNSPECIFIED HYPERLIPIDEMIA TYPE: ICD-10-CM

## 2023-09-14 DIAGNOSIS — U07.1 COVID-19 VIRUS DETECTED: ICD-10-CM

## 2023-09-14 DIAGNOSIS — R73.9 HYPERGLYCEMIA: ICD-10-CM

## 2023-09-14 DIAGNOSIS — C50.911 BILATERAL MALIGNANT NEOPLASM OF BREAST IN FEMALE, ESTROGEN RECEPTOR POSITIVE, UNSPECIFIED SITE OF BREAST: ICD-10-CM

## 2023-09-14 DIAGNOSIS — C50.912 BILATERAL MALIGNANT NEOPLASM OF BREAST IN FEMALE, ESTROGEN RECEPTOR POSITIVE, UNSPECIFIED SITE OF BREAST: ICD-10-CM

## 2023-09-14 DIAGNOSIS — R50.9 FEVER, UNSPECIFIED FEVER CAUSE: Primary | ICD-10-CM

## 2023-09-14 DIAGNOSIS — U07.1 COVID-19 VIRUS INFECTION: ICD-10-CM

## 2023-09-14 DIAGNOSIS — Z17.0 BILATERAL MALIGNANT NEOPLASM OF BREAST IN FEMALE, ESTROGEN RECEPTOR POSITIVE, UNSPECIFIED SITE OF BREAST: ICD-10-CM

## 2023-09-14 DIAGNOSIS — K52.832 LYMPHOCYTIC COLITIS: ICD-10-CM

## 2023-09-14 LAB
CTP QC/QA: YES
CTP QC/QA: YES
POC MOLECULAR INFLUENZA A AGN: NEGATIVE
POC MOLECULAR INFLUENZA B AGN: NEGATIVE
SARS-COV-2 RDRP RESP QL NAA+PROBE: POSITIVE

## 2023-09-14 PROCEDURE — 99214 PR OFFICE/OUTPT VISIT, EST, LEVL IV, 30-39 MIN: ICD-10-PCS | Mod: S$PBB,,, | Performed by: FAMILY MEDICINE

## 2023-09-14 PROCEDURE — 99999PBSHW POCT INFLUENZA A/B MOLECULAR: Mod: PBBFAC,,,

## 2023-09-14 PROCEDURE — 99999PBSHW: Mod: PBBFAC,,,

## 2023-09-14 PROCEDURE — 99214 OFFICE O/P EST MOD 30 MIN: CPT | Mod: S$PBB,,, | Performed by: FAMILY MEDICINE

## 2023-09-14 PROCEDURE — 87635 SARS-COV-2 COVID-19 AMP PRB: CPT | Mod: PBBFAC | Performed by: FAMILY MEDICINE

## 2023-09-14 PROCEDURE — 99999 PR PBB SHADOW E&M-EST. PATIENT-LVL III: CPT | Mod: PBBFAC,,, | Performed by: FAMILY MEDICINE

## 2023-09-14 PROCEDURE — 99999 PR PBB SHADOW E&M-EST. PATIENT-LVL III: ICD-10-PCS | Mod: PBBFAC,,, | Performed by: FAMILY MEDICINE

## 2023-09-14 PROCEDURE — 87502 INFLUENZA DNA AMP PROBE: CPT | Mod: PBBFAC | Performed by: FAMILY MEDICINE

## 2023-09-14 PROCEDURE — 99213 OFFICE O/P EST LOW 20 MIN: CPT | Mod: PBBFAC | Performed by: FAMILY MEDICINE

## 2023-09-14 PROCEDURE — 99999PBSHW: ICD-10-PCS | Mod: PBBFAC,,,

## 2023-09-14 RX ORDER — NIRMATRELVIR AND RITONAVIR 300-100 MG
KIT ORAL
Qty: 30 TABLET | Refills: 0 | Status: SHIPPED | OUTPATIENT
Start: 2023-09-14 | End: 2024-01-02

## 2023-09-14 RX ORDER — PROMETHAZINE HYDROCHLORIDE AND DEXTROMETHORPHAN HYDROBROMIDE 6.25; 15 MG/5ML; MG/5ML
5 SYRUP ORAL 3 TIMES DAILY
Qty: 150 ML | Refills: 0 | Status: SHIPPED | OUTPATIENT
Start: 2023-09-14 | End: 2023-09-24

## 2023-09-14 NOTE — PROGRESS NOTES
Subjective:       Patient ID: Teresa Orozco is a 78 y.o. female.    Chief Complaint: Fever    Onset yesterday of fever with temperature of 104° nasal congestion productive cough.  She denies sinus pressure loss smell diarrhea nausea vomiting frequency urgency or dysuria.  She reports she was exposed to several people at a  recently who had upper respiratory symptoms.  Medical history includes postop breast cancer hyperglycemia lymphocytic colitis.  One-month ago she had pain in the left upper tooth region.  Additionally felt to be an abscess.  However x-rays were taken by a dentist no abscess was present.  She was told is probably to filling causing some nerve inflammation.  She has chronic ongoing tinnitus with some hearing loss.    Fever   Associated symptoms include congestion and coughing. Pertinent negatives include no abdominal pain, chest pain, diarrhea, headaches, nausea, rash, sore throat, urinary pain, vomiting or wheezing.     Review of Systems   Constitutional:  Positive for fever. Negative for chills.   HENT:  Positive for congestion. Negative for postnasal drip, sinus pressure and sore throat.    Respiratory:  Positive for cough. Negative for chest tightness, shortness of breath and wheezing.    Cardiovascular:  Negative for chest pain, palpitations and leg swelling.   Gastrointestinal:  Negative for abdominal distention, abdominal pain, diarrhea, nausea and vomiting.   Genitourinary:  Negative for dysuria, frequency, hematuria and urgency.   Skin:  Negative for rash.   Neurological:  Negative for light-headedness and headaches.       Objective:      Physical Exam  Constitutional:       General: She is not in acute distress.     Appearance: She is not diaphoretic.   HENT:      Right Ear: Tympanic membrane normal.      Left Ear: Tympanic membrane normal.      Nose: Congestion present.      Comments: Tooth of concern with no gingival swelling  Eyes:      Conjunctiva/sclera: Conjunctivae normal.    Cardiovascular:      Rate and Rhythm: Normal rate and regular rhythm.      Heart sounds: No murmur heard.     No gallop.   Pulmonary:      Effort: Pulmonary effort is normal. No respiratory distress.      Breath sounds: No wheezing, rhonchi or rales.   Abdominal:      General: There is no distension.      Tenderness: There is no abdominal tenderness.   Lymphadenopathy:      Cervical: No cervical adenopathy.   Skin:     General: Skin is warm and dry.      Coloration: Skin is not pale.      Findings: No erythema or rash.   Neurological:      Mental Status: She is alert.         Lab Visit on 08/28/2023   Component Date Value Ref Range Status    Sodium 08/28/2023 140  136 - 145 mmol/L Final    Potassium 08/28/2023 4.1  3.5 - 5.1 mmol/L Final    Chloride 08/28/2023 101  95 - 110 mmol/L Final    CO2 08/28/2023 26  23 - 29 mmol/L Final    Glucose 08/28/2023 107  70 - 110 mg/dL Final    BUN 08/28/2023 9  8 - 23 mg/dL Final    Creatinine 08/28/2023 0.8  0.5 - 1.4 mg/dL Final    Calcium 08/28/2023 9.6  8.7 - 10.5 mg/dL Final    Total Protein 08/28/2023 6.8  6.0 - 8.4 g/dL Final    Albumin 08/28/2023 3.5  3.5 - 5.2 g/dL Final    Total Bilirubin 08/28/2023 0.5  0.1 - 1.0 mg/dL Final    Alkaline Phosphatase 08/28/2023 89  55 - 135 U/L Final    AST 08/28/2023 24  10 - 40 U/L Final    ALT 08/28/2023 18  10 - 44 U/L Final    eGFR 08/28/2023 >60.0  >60 mL/min/1.73 m^2 Final    Anion Gap 08/28/2023 13  8 - 16 mmol/L Final    WBC 08/28/2023 10.01  3.90 - 12.70 K/uL Final    RBC 08/28/2023 4.55  4.00 - 5.40 M/uL Final    Hemoglobin 08/28/2023 13.3  12.0 - 16.0 g/dL Final    Hematocrit 08/28/2023 41.8  37.0 - 48.5 % Final    MCV 08/28/2023 92  82 - 98 fL Final    MCH 08/28/2023 29.2  27.0 - 31.0 pg Final    MCHC 08/28/2023 31.8 (L)  32.0 - 36.0 g/dL Final    RDW 08/28/2023 14.0  11.5 - 14.5 % Final    Platelets 08/28/2023 328  150 - 450 K/uL Final    MPV 08/28/2023 11.4  9.2 - 12.9 fL Final    Immature Granulocytes 08/28/2023 0.4  0.0  - 0.5 % Final    Gran # (ANC) 08/28/2023 6.5  1.8 - 7.7 K/uL Final    Immature Grans (Abs) 08/28/2023 0.04  0.00 - 0.04 K/uL Final    Lymph # 08/28/2023 2.4  1.0 - 4.8 K/uL Final    Mono # 08/28/2023 0.8  0.3 - 1.0 K/uL Final    Eos # 08/28/2023 0.3  0.0 - 0.5 K/uL Final    Baso # 08/28/2023 0.06  0.00 - 0.20 K/uL Final    nRBC 08/28/2023 0  0 /100 WBC Final    Gran % 08/28/2023 64.5  38.0 - 73.0 % Final    Lymph % 08/28/2023 24.0  18.0 - 48.0 % Final    Mono % 08/28/2023 8.0  4.0 - 15.0 % Final    Eosinophil % 08/28/2023 2.5  0.0 - 8.0 % Final    Basophil % 08/28/2023 0.6  0.0 - 1.9 % Final    Differential Method 08/28/2023 Automated   Final    Hemoglobin A1C 08/28/2023 5.8 (H)  4.0 - 5.6 % Final    Estimated Avg Glucose 08/28/2023 120  68 - 131 mg/dL Final     Assessment:       1. Fever, unspecified fever cause    2. COVID-19 virus infection    3. Bilateral malignant neoplasm of breast in female, estrogen receptor positive, unspecified site of breast    4. Lymphocytic colitis    5. Hyperglycemia    6. Hyperlipidemia, unspecified hyperlipidemia type        Plan:     Influenza COVID test were done.  COVID is positive Paxlovid promethazine DM.  She will hold atorvastatin while taking Paxlovid.  Isolation at home for 5 days and wear mask for additional 5 days.  Call me if fever increased cough shortness of breath develops.  Her  will be checking COVID test daily and let me know if he becomes positive.    Fever, unspecified fever cause  -     POCT COVID-19 Rapid Screening; Future; Expected date: 09/14/2023  -     POCT Influenza A/B Molecular; Future; Expected date: 09/14/2023    COVID-19 virus infection    Bilateral malignant neoplasm of breast in female, estrogen receptor positive, unspecified site of breast    Lymphocytic colitis    Hyperglycemia    Hyperlipidemia, unspecified hyperlipidemia type    Other orders  -     nirmatrelvir-ritonavir (PAXLOVID) 300 mg (150 mg x 2)-100 mg copackaged tablets (EUA); Take  3 tablets by mouth 2 (two) times daily. Each dose contains 2 nirmatrelvir (pink tablets) and 1 ritonavir (white tablet). Take all 3 tablets together  Dispense: 30 tablet; Refill: 0  -     promethazine-dextromethorphan (PROMETHAZINE-DM) 6.25-15 mg/5 mL Syrp; Take 5 mLs by mouth 3 (three) times daily. for 10 days  Dispense: 150 mL; Refill: 0

## 2023-10-23 ENCOUNTER — PATIENT MESSAGE (OUTPATIENT)
Dept: INTERNAL MEDICINE | Facility: CLINIC | Age: 78
End: 2023-10-23
Payer: MEDICARE

## 2023-10-24 DIAGNOSIS — N76.0 ACUTE VAGINITIS: Primary | ICD-10-CM

## 2023-10-24 RX ORDER — FLUCONAZOLE 150 MG/1
150 TABLET ORAL DAILY
Qty: 2 TABLET | Refills: 0 | Status: SHIPPED | OUTPATIENT
Start: 2023-10-24 | End: 2023-10-26

## 2024-01-02 ENCOUNTER — OFFICE VISIT (OUTPATIENT)
Dept: RHEUMATOLOGY | Facility: CLINIC | Age: 79
End: 2024-01-02
Payer: MEDICARE

## 2024-01-02 VITALS
SYSTOLIC BLOOD PRESSURE: 125 MMHG | WEIGHT: 201.06 LBS | HEART RATE: 77 BPM | HEIGHT: 65 IN | BODY MASS INDEX: 33.5 KG/M2 | DIASTOLIC BLOOD PRESSURE: 79 MMHG

## 2024-01-02 DIAGNOSIS — M19.90 ARTHRITIS: ICD-10-CM

## 2024-01-02 PROCEDURE — 99999 PR PBB SHADOW E&M-EST. PATIENT-LVL V: CPT | Mod: PBBFAC,,, | Performed by: STUDENT IN AN ORGANIZED HEALTH CARE EDUCATION/TRAINING PROGRAM

## 2024-01-02 PROCEDURE — 99215 OFFICE O/P EST HI 40 MIN: CPT | Mod: PBBFAC | Performed by: STUDENT IN AN ORGANIZED HEALTH CARE EDUCATION/TRAINING PROGRAM

## 2024-01-02 PROCEDURE — 99205 OFFICE O/P NEW HI 60 MIN: CPT | Mod: S$PBB,,, | Performed by: STUDENT IN AN ORGANIZED HEALTH CARE EDUCATION/TRAINING PROGRAM

## 2024-01-02 RX ORDER — DICLOFENAC SODIUM 10 MG/G
2 GEL TOPICAL 4 TIMES DAILY
Qty: 100 G | Refills: 3 | Status: SHIPPED | OUTPATIENT
Start: 2024-01-02 | End: 2024-01-03 | Stop reason: SDUPTHER

## 2024-01-02 RX ORDER — LORAZEPAM 0.5 MG/1
0.5 TABLET ORAL EVERY 6 HOURS PRN
COMMUNITY
Start: 2023-09-23

## 2024-01-02 NOTE — PATIENT INSTRUCTIONS
Try taking Tylenol up to 3000mg daily (from all sources)    Try taking Turmeric (make sure contains black pepper extract)    Try using Voltaren gel up to four times daily on painful joints    Consider Evista

## 2024-01-03 ENCOUNTER — PATIENT MESSAGE (OUTPATIENT)
Dept: RHEUMATOLOGY | Facility: CLINIC | Age: 79
End: 2024-01-03
Payer: MEDICARE

## 2024-01-03 DIAGNOSIS — M19.90 ARTHRITIS: ICD-10-CM

## 2024-01-03 RX ORDER — DICLOFENAC SODIUM 10 MG/G
2 GEL TOPICAL 4 TIMES DAILY
Qty: 100 G | Refills: 3 | Status: SHIPPED | OUTPATIENT
Start: 2024-01-03

## 2024-01-10 NOTE — PROGRESS NOTES
RHEUMATOLOGY CLINIC INITIAL VISIT    Reason for consult:- arthritis    Chief complaints, HPI, ROS, EXAM, Assessment & Plans:-    Teresa Orozco is a 78 y.o. pleasant female who presents to be evaluated for arthritis.  She has several year history of joint pains especially in the shoulders, knees, CMC joints of her hands.  Of note, she has history of breast cancer for which she had to take tamoxifen and anastrozole.  She attributes a lot of her pain to taking those.  Had some improvement in her pain in February of last year when she stopped tamoxifen.  However since being off she is continued to have some pain in her joints which has progressed.  Previously meloxicam without too much relief.  Currently she takes Tylenol or ibuprofen which does help somewhat.  She is tried icy hot as well.  States that orthotics seemed to help with her knee pain.  The last couple of months she does note some increased diffuse hair loss and increase in joint pains.  Of note, she did have COVID in September.  In general pain is worse with activity and better with rest.  Rheumatologic review of systems otherwise negative.  She has some bony hypertrophy of her interphalangeal joints and CMC squaring.  No synovitis, dactylitis or enthesitis.  No rashes noted.      Reviewed all available old and outside pertinent medical records.    All lab results personally reviewed and interpreted by me.    1. Arthritis        Problem List Items Addressed This Visit          Orthopedic    Arthritis       Patient presenting to be evaluated for polyarthralgias  Overall, low suspicion for inflammatory arthritis  Rather history, exam and previous imaging are more consistent with osteoarthritis (with possible erosive features)  Encouraged to take up to 3000mg of Tylenol daily (from all sources), try taking turmeric supplement and use Voltaren gel up to four times daily on painful joints  Can continue to take ibuprofen sparingly  Discussed that Evista might be a  good option for her bone density in light of her breast cancer history  she will consider    # Follow up in about 1 year (around 2025).    Chronic comorbid conditions affecting medical decision making today:    Past Medical History:   Diagnosis Date    Anxiety     Arthritis     Breast cancer     Depression     Fluid retention     Headache     Hyperlipidemia     IBS (irritable bowel syndrome)     Inflammatory bowel disease     lymphocitic colitis s/p radiation    Low back pain, non-specific 10/2/2019    Lymphocytic colitis 2008    in remission    Obesity     Pneumonia     around age 42, did not require hospitalization    Vertigo     intermittent       Past Surgical History:   Procedure Laterality Date    ADENOIDECTOMY      APPENDECTOMY      BREAST SURGERY Bilateral 2018    bilateral mastectomy    CHOLECYSTECTOMY      COLON SURGERY      colonoscopy, polyp removed    COLONOSCOPY W/ BIOPSIES AND POLYPECTOMY  2011    EYE SURGERY      cataracts    HYSTERECTOMY      OOPHORECTOMY Left     TONSILLECTOMY      TUBAL LIGATION          Social History     Tobacco Use    Smoking status: Former     Current packs/day: 0.00     Average packs/day: 1 pack/day for 5.6 years (5.6 ttl pk-yrs)     Types: Cigarettes     Start date: 1968     Quit date: 1973     Years since quittin.4    Smokeless tobacco: Never   Substance Use Topics    Alcohol use: No    Drug use: Never       Family History   Problem Relation Age of Onset    Cancer Mother     Parkinsonism Mother     Cancer Father         prostate    Heart disease Father     Arthritis Father     No Known Problems Sister     Diabetes Brother         currently controlled through diet    Cancer Brother         prostate    No Known Problems Son     Thyroid disease Neg Hx     Migraines Neg Hx     Asthma Neg Hx        Review of patient's allergies indicates:   Allergen Reactions    Adhesive tape-silicones Dermatitis and Blisters     Also allergic to anchor  sutures    Phipps flavor      Can't eat any berries except strawberries. Strawberries does not cause any problems.    Demerol [meperidine]     Pantoprazole      Face flush/ tight chest    Shellfish containing products        Medication List with Changes/Refills   New Medications    DICLOFENAC SODIUM (VOLTAREN ARTHRITIS PAIN) 1 % GEL    Apply 2 g topically 4 (four) times daily.   Current Medications    ACETAMINOPHEN (TYLENOL) 325 MG TABLET    Take 325 mg by mouth as needed.     C/SOURCHERRY/CELERY/GRAPE SEED (TART JEFFERSON ORAL)    Take by mouth.    ESOMEPRAZOLE (NEXIUM) 40 MG CAPSULE    Take 40 mg by mouth once daily.    FLAXSEED OIL OIL    by Misc.(Non-Drug; Combo Route) route.    FLUTICASONE PROPIONATE (FLONASE) 50 MCG/ACTUATION NASAL SPRAY    2 sprays (100 mcg total) by Each Nostril route once daily.    HYDROCHLOROTHIAZIDE (HYDRODIURIL) 25 MG TABLET    Take 1 tablet (25 mg total) by mouth once daily.    LORAZEPAM (ATIVAN) 0.5 MG TABLET    Take 0.5 mg by mouth every 6 (six) hours as needed.    MULTIVITAMIN CAPSULE    Take 1 capsule by mouth once daily.    POTASSIUM CHLORIDE SA (K-DUR,KLOR-CON) 20 MEQ TABLET    Take 1 tablet (20 mEq total) by mouth once daily.    PROPRANOLOL (INDERAL LA) 120 MG 24 HR CAPSULE    Take 1 capsule (120 mg total) by mouth once daily.   Discontinued Medications    ATORVASTATIN (LIPITOR) 20 MG TABLET    TAKE 1 TABLET ONCE DAILY    NIRMATRELVIR-RITONAVIR (PAXLOVID) 300 MG (150 MG X 2)-100 MG COPACKAGED TABLETS (EUA)    Take 3 tablets by mouth 2 (two) times daily. Each dose contains 2 nirmatrelvir (pink tablets) and 1 ritonavir (white tablet). Take all 3 tablets together         Disclaimer: This note was prepared using voice recognition system and is likely to have sound alike errors and is not proofread.  Please message me with any questions.    63 minutes of total time spent on the encounter, which includes face to face time and non-face to face time preparing to see the patient (eg, review  of tests), Obtaining and/or reviewing separately obtained history, Documenting clinical information in the electronic or other health record, Independently interpreting results (not separately reported) and communicating results to the patient/family/caregiver, or Care coordination (not separately reported).     Thank you for allowing me to participate in the care of Teresa Orozco.    Faheem Dunlap MD

## 2024-01-29 ENCOUNTER — TELEPHONE (OUTPATIENT)
Dept: INTERNAL MEDICINE | Facility: CLINIC | Age: 79
End: 2024-01-29
Payer: MEDICARE

## 2024-01-29 ENCOUNTER — PATIENT MESSAGE (OUTPATIENT)
Dept: INTERNAL MEDICINE | Facility: CLINIC | Age: 79
End: 2024-01-29
Payer: MEDICARE

## 2024-01-29 NOTE — TELEPHONE ENCOUNTER
I have an appointment with my oncologist on 2/1 which includes labs.  I have an appointment with Dr. Joshi 2/28 for my 6 month follow up which usually includes a lipid profile.   My blood draws are very difficult so I would like to have Dr. Bucio's lab just add a Lipid profile to my bloodwork.  We have done this several times in the past, but Dr. Bucio has changed clinics and they need an order from Dr. Joshi.  Please advise.  Thanks

## 2024-01-30 ENCOUNTER — TELEPHONE (OUTPATIENT)
Dept: INTERNAL MEDICINE | Facility: CLINIC | Age: 79
End: 2024-01-30
Payer: MEDICARE

## 2024-01-30 DIAGNOSIS — E78.5 HYPERLIPIDEMIA, UNSPECIFIED HYPERLIPIDEMIA TYPE: Primary | ICD-10-CM

## 2024-01-30 NOTE — TELEPHONE ENCOUNTER
Pt is requesting an external order to get lipid's drawn at a different facility. Pt states that she would like to have that done when she gets her labs done with her oncology dr. Please advise.

## 2024-01-30 NOTE — TELEPHONE ENCOUNTER
----- Message from Marina Gonzalez sent at 1/30/2024 11:35 AM CST -----  Contact: [phone]  Type:  Patient Returning Call    Who Called:pt  Who Left Message for Patient:Lashanda  Does the patient know what this is regarding?:missed call  Would the patient rather a call back or a response via [facility]? call  Best Call Back Number: [phone]  Additional Information:

## 2024-02-28 ENCOUNTER — LAB VISIT (OUTPATIENT)
Dept: LAB | Facility: HOSPITAL | Age: 79
End: 2024-02-28
Attending: FAMILY MEDICINE
Payer: MEDICARE

## 2024-02-28 ENCOUNTER — OFFICE VISIT (OUTPATIENT)
Dept: INTERNAL MEDICINE | Facility: CLINIC | Age: 79
End: 2024-02-28
Payer: MEDICARE

## 2024-02-28 VITALS
OXYGEN SATURATION: 97 % | HEIGHT: 65 IN | DIASTOLIC BLOOD PRESSURE: 84 MMHG | TEMPERATURE: 99 F | SYSTOLIC BLOOD PRESSURE: 130 MMHG | HEART RATE: 72 BPM | WEIGHT: 200.38 LBS | BODY MASS INDEX: 33.38 KG/M2

## 2024-02-28 DIAGNOSIS — E78.5 HYPERLIPIDEMIA, UNSPECIFIED HYPERLIPIDEMIA TYPE: ICD-10-CM

## 2024-02-28 DIAGNOSIS — M17.0 PRIMARY OSTEOARTHRITIS OF BOTH KNEES: ICD-10-CM

## 2024-02-28 DIAGNOSIS — M19.041 PRIMARY OSTEOARTHRITIS OF RIGHT HAND: Primary | ICD-10-CM

## 2024-02-28 DIAGNOSIS — M19.011 PRIMARY OSTEOARTHRITIS OF BOTH SHOULDERS: ICD-10-CM

## 2024-02-28 DIAGNOSIS — M19.012 PRIMARY OSTEOARTHRITIS OF BOTH SHOULDERS: ICD-10-CM

## 2024-02-28 DIAGNOSIS — C50.912 BILATERAL MALIGNANT NEOPLASM OF BREAST IN FEMALE, ESTROGEN RECEPTOR POSITIVE, UNSPECIFIED SITE OF BREAST: ICD-10-CM

## 2024-02-28 DIAGNOSIS — C50.911 BILATERAL MALIGNANT NEOPLASM OF BREAST IN FEMALE, ESTROGEN RECEPTOR POSITIVE, UNSPECIFIED SITE OF BREAST: ICD-10-CM

## 2024-02-28 DIAGNOSIS — Z17.0 BILATERAL MALIGNANT NEOPLASM OF BREAST IN FEMALE, ESTROGEN RECEPTOR POSITIVE, UNSPECIFIED SITE OF BREAST: ICD-10-CM

## 2024-02-28 PROBLEM — L40.50 PSORIATIC ARTHRITIS: Status: RESOLVED | Noted: 2023-04-17 | Resolved: 2024-02-28

## 2024-02-28 LAB
CHOLEST SERPL-MCNC: 231 MG/DL (ref 120–199)
CHOLEST/HDLC SERPL: 5.3 {RATIO} (ref 2–5)
ERYTHROCYTE [SEDIMENTATION RATE] IN BLOOD BY WESTERGREN METHOD: 35 MM/HR (ref 0–20)
HDLC SERPL-MCNC: 44 MG/DL (ref 40–75)
HDLC SERPL: 19 % (ref 20–50)
LDLC SERPL CALC-MCNC: 144.6 MG/DL (ref 63–159)
NONHDLC SERPL-MCNC: 187 MG/DL
TRIGL SERPL-MCNC: 212 MG/DL (ref 30–150)

## 2024-02-28 PROCEDURE — 85651 RBC SED RATE NONAUTOMATED: CPT | Performed by: FAMILY MEDICINE

## 2024-02-28 PROCEDURE — 80061 LIPID PANEL: CPT | Performed by: FAMILY MEDICINE

## 2024-02-28 PROCEDURE — 99215 OFFICE O/P EST HI 40 MIN: CPT | Mod: PBBFAC | Performed by: FAMILY MEDICINE

## 2024-02-28 PROCEDURE — 99214 OFFICE O/P EST MOD 30 MIN: CPT | Mod: S$PBB,,, | Performed by: FAMILY MEDICINE

## 2024-02-28 PROCEDURE — 99999 PR PBB SHADOW E&M-EST. PATIENT-LVL V: CPT | Mod: PBBFAC,,, | Performed by: FAMILY MEDICINE

## 2024-02-28 PROCEDURE — 36415 COLL VENOUS BLD VENIPUNCTURE: CPT | Performed by: FAMILY MEDICINE

## 2024-02-28 NOTE — PROGRESS NOTES
Subjective:       Patient ID: Teresa Orozco is a 78 y.o. female.    Chief Complaint: Follow-up    She has osteoarthritis.  She was told by several rheumatologist she did not have psoriatic arthritis.  She has shoulder bilateral and right pain.  She was prescribed Voltaren but discontinued due to potential side effects.  She also has hyperlipidemia and due for lipid profile.  She has bilateral reoccurrence cerumen impaction.    Follow-up  Associated symptoms include arthralgias. Pertinent negatives include no chest pain, chills, congestion, coughing, fever, headaches or weakness.     Review of Systems   Constitutional:  Negative for activity change, appetite change, chills, fever and unexpected weight change.   HENT:  Negative for congestion and hearing loss.    Respiratory:  Negative for cough, chest tightness, shortness of breath and wheezing.    Cardiovascular:  Negative for chest pain, palpitations and leg swelling.   Musculoskeletal:  Positive for arthralgias.   Neurological:  Negative for dizziness, weakness, light-headedness and headaches.       Objective:      Physical Exam  Constitutional:       General: She is not in acute distress.     Appearance: She is not ill-appearing or diaphoretic.   HENT:      Ears:      Comments: Some cerumen but no occlusion or impaction  Cardiovascular:      Rate and Rhythm: Normal rate and regular rhythm.      Heart sounds: No murmur heard.     No gallop.   Pulmonary:      Effort: Pulmonary effort is normal. No respiratory distress.      Breath sounds: No wheezing, rhonchi or rales.   Abdominal:      General: There is no distension.   Musculoskeletal:      Comments: Fill range of motion shoulders with no crepitance.  She has moderate bony swelling especially of the left.  She has crepitance of both knees.  She is tenderness with some mild crepitance of the right 1st MCP joint.   Skin:     General: Skin is warm and dry.   Neurological:      Mental Status: She is alert.          Office Visit on 09/14/2023   Component Date Value Ref Range Status    POC Molecular Influenza A Ag 09/14/2023 Negative  Negative, Not Reported Final    POC Molecular Influenza B Ag 09/14/2023 Negative  Negative, Not Reported Final     Acceptable 09/14/2023 Yes   Final    POC Rapid COVID 09/14/2023 Positive (A)  Negative Final     Acceptable 09/14/2023 Yes   Final     Assessment:       1. Primary osteoarthritis of right hand    2. Primary osteoarthritis of both knees    3. Bilateral malignant neoplasm of breast in female, estrogen receptor positive, unspecified site of breast    4. Primary osteoarthritis of both shoulders    5. Hyperlipidemia, unspecified hyperlipidemia type        Plan:     Recommend turmeric daily.  Orthopedic referral for possible injection.  Physical therapy evaluation for osteoarthritis shoulder and knees.  Debrox for earwax.  Lipid profile sed rate ordered.  Follow-up in 3 months previous MRII reviewed cervical spine discussed with her  he is followed by Oncology for breast cancer.  No longer on tamoxifen    Primary osteoarthritis of right hand  -     Ambulatory referral/consult to Orthopedics; Future; Expected date: 03/06/2024    Primary osteoarthritis of both knees  -     Ambulatory referral/consult to Physical/Occupational Therapy; Future; Expected date: 03/06/2024  -     Sedimentation rate; Future; Expected date: 02/28/2024    Bilateral malignant neoplasm of breast in female, estrogen receptor positive, unspecified site of breast    Primary osteoarthritis of both shoulders  -     Ambulatory referral/consult to Physical/Occupational Therapy; Future; Expected date: 03/06/2024    Hyperlipidemia, unspecified hyperlipidemia type  -     Lipid Panel; Future; Expected date: 02/28/2024

## 2024-02-29 ENCOUNTER — TELEPHONE (OUTPATIENT)
Dept: INTERNAL MEDICINE | Facility: CLINIC | Age: 79
End: 2024-02-29
Payer: MEDICARE

## 2024-02-29 NOTE — PROGRESS NOTES
[facility]  Physical Therapy Initial Evaluation    Name: Teresa Orozco  Clinic Number: [id number]    Therapy Diagnosis:   Encounter Diagnoses   Name Primary?    Primary osteoarthritis of both knees     Primary osteoarthritis of both shoulders     Chronic bilateral low back pain without sciatica Yes     Physician: Jaiden Joshi MD    Physician Orders: PT Eval and Treat   Medical Diagnosis from Referral:   M17.0 (ICD-10-CM) - Primary osteoarthritis of both knees   M19.011,M19.012 (ICD-10-CM) - Primary osteoarthritis of both shoulders     Evaluation Date: 3/1/2024  Authorization Period Expiration: 2/28/24  Plan of Care Expiration: 5/9/24  Visit # / Visits authorized: 1/ 1  Foto1/3    Time In: 2:00 pm  Time Out: 2:55 pm  Total Billable Time: 55 minutes    Precautions: Standard, anxiety, depression, IBS, intermittent vertigo, pelvic floor PT in the past    Subjective   Date of onset: years  History of current condition - Teresa reports: R knee pain worse than L but she is having lateral R knee pain, B shoulder pain and neck pain. Pt currently has an oral abscess so she is not feeling well today but wants to focus on the R knee pain which is the most severe.     Pain:  Current 8/10, worst 8/10, best 0/10   Location: knees, shoulders, neck and back  Description: sore   Aggravating Factors: R knee pain  Easing Factors: rubbing, massage, over the counter    Prior Therapy: yes  Social History:  lives with their spouse  Occupation: retired   Prior Level of Function: lots of standing, walking and lifting, and rising from a chair  Current Level of Function: limited in HEP for pelvic floor etc, sitting and sleeping due to pain, getting out of a chair is difficult    Imaging: xrays taken but not on file well    Medical History:   Past Medical History:   Diagnosis Date    Anxiety     Arthritis     Breast cancer 2008    Depression     Fluid retention     Headache     Hyperlipidemia     IBS  (irritable bowel syndrome)     Inflammatory bowel disease     lymphocitic colitis s/p radiation    Low back pain, non-specific 10/2/2019    Lymphocytic colitis 2008    in remission    Obesity     Pneumonia     around age 42, did not require hospitalization    Vertigo     intermittent       Surgical History:   Teresa Orozco  has a past surgical history that includes Oophorectomy (Left); Hysterectomy; Tubal ligation; Colonoscopy w/ biopsies and polypectomy (07/2011); Cholecystectomy (2002); Colon surgery; Breast surgery (Bilateral, 03/2018); Appendectomy; Eye surgery; Tonsillectomy; and Adenoidectomy (1951).    Medications:   Teresa has a current medication list which includes the following prescription(s): acetaminophen, c/sourcherry/celery/grape seed, esomeprazole, flaxseed oil, fluticasone propionate, hydrochlorothiazide, lorazepam, multivitamin, potassium chloride sa, and propranolol.    Allergies:   Review of patient's allergies indicates:   Allergen Reactions    Adhesive tape-silicones Dermatitis and Blisters     Also allergic to anchor sutures    Phipps flavor      Can't eat any berries except strawberries. Strawberries does not cause any problems.    Demerol [meperidine]     Pantoprazole      Face flush/ tight chest    Shellfish containing products         Pts goals: less pain in R knee and back    Objective       CMS Impairment/Limitation/Restriction for FOTO knee Survey    Therapist reviewed FOTO scores for Teresa Orozco on 3/1/2024.   FOTO documents entered into [employer] - see Media section.    Functional Score: 22       Posture: increased lumbar lordosis, increased T spine flexion, protruding abdomen, edema in legs    Gait: anterior lean, head to the L in side flexion, less toe push off and less hip flexion    Balance: R LE single leg stand= 3 sec, L LE single leg stand = 8 sec, hip drop in single leg stand on both sides; R tibia turns out laterally mostly vs L.     Sensation: Sensation intact to light touch B  LE's     Cervical ROM  Flexion 35  Extension 40  R side flexion 40  L side flexion  45  R rotation 70  L rotation 80    Shoulder ROM is limited with extension R at 40 and 45 degrees to the L  Internal/external is full but L internal rotation is at 60 degrees but 70 on L    Knee AROM:     (R) (L)     Flexion   120 124     Extension  10 0          Hip AROM:  Flexion   90 100     ER   20 20     IR   20 20     ABD   20 20    Ankle AROM:  Ankle DF(davey) 0 0         Strength:     R L  Hip flexors L2   4+/5 4+/5  Quadriceps L3   5/5 5/5     Hamstrings S1  4/5 3+/5    Dorsiflexion L4  5/5 5/5      Plantar flexion S1  2/5 2/5    Gluteus Medius L45S1 3+/5 4/5    Gluteus Rashid L5S12 3+/5 4/5      Joint Mobility/palpation: multiple trigger points in the quads, calves and PT noted mild patellar tracking lateral and R hip externally rotated. Tight lateral hamstring as well.      Special Test: Patellar tapping -, pop fossa edema is mild.        TREATMENT   Treatment Time In: 2:20 pm  Treatment Time Out: 3: 00 pm  Total Treatment time separate from Evaluation: 40 minutes    Teresa received the following manual therapy techniques: Soft tissue mobs and Joint mobilizations were applied to the: R for 8 minutes, including:  Patellar mobs  Soft tissue mobs to the quad trigger points    Teresa participated in neuromuscular re-education activities to improve: Balance, Kinesthetic, Proprioception, and Posture for 32 minutes. The following activities were included:    Standing posterior pelvic tilt 3x 10  Standing trunk flexion 2x 5  Posterior pelvic tilt 2x 10 standing then hook lying 1x 20  Quad sets R 2 x20; L 1x 10   SLR R 2x 10  Prone hamstring stretch  R 2x 10 then add PPT with to support low back  Bed mobility training    Home Exercises and Patient Education Provided    Education provided:   -Education on condition, HEP, and PT educated pt on tasks     Written Home Exercises Provided: Patient instructed to cont prior HEP.  Exercises  were reviewed and Teresa was able to demonstrate them prior to the end of the session.  Teresa demonstrated good  understanding of the education provided.     See EMR under Patient Instructions for exercises provided 3/1/2024.    Assessment   Teresa is a 78 y.o. female referred to outpatient Physical Therapy with a medical diagnosis of   M17.0 (ICD-10-CM) - Primary osteoarthritis of both knees   M19.011,M19.012 (ICD-10-CM) - Primary osteoarthritis of both shoulders   . The patient presents with signs and symptoms consistent with diagnosis along with knee pain, knee ROM loss, neck ROM loss and impairments which include impairments list: ROM, strength, endurance, joint mobility, muscle length, balance, posture, gait mechanics, core strength and stability, and functional movement patterns.  These impairments are limiting patient's ability to walk on level and unlevel terri and perform standing without a lot of pain. At times interrupts her sleep.     Pt prognosis is Good.   Pt will benefit from skilled outpatient Physical Therapy to address the deficits stated above and in the chart below, provide pt/family education, and to maximize pt's level of independence.     Plan of care discussed with patient: Yes  Pt's spiritual, cultural and educational needs considered and patient is agreeable to the plan of care and goals as stated below:     Anticipated Barriers for therapy: none    Medical Necessity is demonstrated by the following  History  Co-morbidities and personal factors that may impact the plan of care [x] LOW: no personal factors / co-morbidities  [] MODERATE: 1-2 personal factors / co-morbidities  [] HIGH: 3+ personal factors / co-morbidities    Moderate / High Support Documentation:   Co-morbidities affecting plan of care: na    Personal Factors:   lifestyle     Examination  Body Structures and Functions, activity limitations and participation restrictions that may impact the plan of care [x] LOW: addressing 1-2  elements  [] MODERATE: 3+ elements  [] HIGH: 4+ elements (please support below)    Moderate / High Support Documentation: na     Clinical Presentation [x] LOW: stable  [] MODERATE: Evolving  [] HIGH: Unstable     Decision Making/ Complexity Score: low         Goals:  Short Term Goals: In 4 weeks:  1.Pt to be educated on HEP.  2.Patient to demo increased AROM to knee flexion to 125 or better  3.Patient to increase strength hip abduction to 4/5 or better.  4.Patient to have decreased pain to 4/10 or better.  5.Patient to increase LE balance to single leg stand up to 15 sec or better.  6.Patient to improve score on the FOTO by 10%.      Long Term Goals: In 10 weeks  1. Patient to perform daily activities including sit to stand without guarding and dead lifting up to 30 # for home management.  2. Patient to demonstrate hip abduction strength to 5/5.  3. Patient to have decreased pain to 3/10 or better in the R knee.  5. Patient to improve score on the FOTO to 44.      Plan   Plan of care Certification: 3/1/2024 to 5/9/24.    Outpatient Physical Therapy 2 times weekly for 10 weeks to include the following interventions: Cervical/Lumbar Traction, Electrical Stimulation IFC, NMES, Gait Training, Manual Therapy, Moist Heat/ Ice, Neuromuscular Re-ed, Patient Education, Self Care, Therapeutic Activities, Therapeutic Exercise, and DN .     Daphne Mccollum, PT, CIDN, SFMA    Thank you for this referral.    These services are reasonable and necessary for the conditions set forth above while under my care.

## 2024-03-01 ENCOUNTER — CLINICAL SUPPORT (OUTPATIENT)
Dept: REHABILITATION | Facility: HOSPITAL | Age: 79
End: 2024-03-01
Payer: MEDICARE

## 2024-03-01 DIAGNOSIS — M19.011 PRIMARY OSTEOARTHRITIS OF BOTH SHOULDERS: ICD-10-CM

## 2024-03-01 DIAGNOSIS — M54.50 CHRONIC BILATERAL LOW BACK PAIN WITHOUT SCIATICA: Primary | ICD-10-CM

## 2024-03-01 DIAGNOSIS — G89.29 CHRONIC BILATERAL LOW BACK PAIN WITHOUT SCIATICA: Primary | ICD-10-CM

## 2024-03-01 DIAGNOSIS — M17.0 PRIMARY OSTEOARTHRITIS OF BOTH KNEES: ICD-10-CM

## 2024-03-01 DIAGNOSIS — M19.012 PRIMARY OSTEOARTHRITIS OF BOTH SHOULDERS: ICD-10-CM

## 2024-03-01 PROCEDURE — 97140 MANUAL THERAPY 1/> REGIONS: CPT | Mod: PN

## 2024-03-01 PROCEDURE — 97161 PT EVAL LOW COMPLEX 20 MIN: CPT | Mod: PN

## 2024-03-01 PROCEDURE — 97112 NEUROMUSCULAR REEDUCATION: CPT | Mod: PN

## 2024-03-01 NOTE — PLAN OF CARE
[facility]  Physical Therapy Initial Evaluation    Name: Teresa Orozco  Clinic Number: [id number]    Therapy Diagnosis:   Encounter Diagnoses   Name Primary?    Primary osteoarthritis of both knees     Primary osteoarthritis of both shoulders     Chronic bilateral low back pain without sciatica Yes     Physician: Jaiden Joshi MD    Physician Orders: PT Eval and Treat   Medical Diagnosis from Referral:   M17.0 (ICD-10-CM) - Primary osteoarthritis of both knees   M19.011,M19.012 (ICD-10-CM) - Primary osteoarthritis of both shoulders     Evaluation Date: 3/1/2024  Authorization Period Expiration: 2/28/24  Plan of Care Expiration: 5/9/24  Visit # / Visits authorized: 1/ 1  Foto1/3    Time In: 2:00 pm  Time Out: 2:55 pm  Total Billable Time: 55 minutes    Precautions: Standard, anxiety, depression, IBS, intermittent vertigo, pelvic floor PT in the past    Subjective   Date of onset: years  History of current condition - Teresa reports: R knee pain worse than L but she is having lateral R knee pain, B shoulder pain and neck pain. Pt currently has an oral abscess so she is not feeling well today but wants to focus on the R knee pain which is the most severe.     Pain:  Current 8/10, worst 8/10, best 0/10   Location: knees, shoulders, neck and back  Description: sore   Aggravating Factors: R knee pain  Easing Factors: rubbing, massage, over the counter    Prior Therapy: yes  Social History:  lives with their spouse  Occupation: retired   Prior Level of Function: lots of standing, walking and lifting, and rising from a chair  Current Level of Function: limited in HEP for pelvic floor etc, sitting and sleeping due to pain, getting out of a chair is difficult    Imaging: xrays taken but not on file well    Medical History:   Past Medical History:   Diagnosis Date    Anxiety     Arthritis     Breast cancer 2008    Depression     Fluid retention     Headache     Hyperlipidemia     IBS  (irritable bowel syndrome)     Inflammatory bowel disease     lymphocitic colitis s/p radiation    Low back pain, non-specific 10/2/2019    Lymphocytic colitis 2008    in remission    Obesity     Pneumonia     around age 42, did not require hospitalization    Vertigo     intermittent       Surgical History:   Teresa Orozco  has a past surgical history that includes Oophorectomy (Left); Hysterectomy; Tubal ligation; Colonoscopy w/ biopsies and polypectomy (07/2011); Cholecystectomy (2002); Colon surgery; Breast surgery (Bilateral, 03/2018); Appendectomy; Eye surgery; Tonsillectomy; and Adenoidectomy (1951).    Medications:   Teresa has a current medication list which includes the following prescription(s): acetaminophen, c/sourcherry/celery/grape seed, esomeprazole, flaxseed oil, fluticasone propionate, hydrochlorothiazide, lorazepam, multivitamin, potassium chloride sa, and propranolol.    Allergies:   Review of patient's allergies indicates:   Allergen Reactions    Adhesive tape-silicones Dermatitis and Blisters     Also allergic to anchor sutures    Phipps flavor      Can't eat any berries except strawberries. Strawberries does not cause any problems.    Demerol [meperidine]     Pantoprazole      Face flush/ tight chest    Shellfish containing products         Pts goals: less pain in R knee and back    Objective       CMS Impairment/Limitation/Restriction for FOTO knee Survey    Therapist reviewed FOTO scores for Teresa Orozco on 3/1/2024.   FOTO documents entered into [employer] - see Media section.    Functional Score: 22       Posture: increased lumbar lordosis, increased T spine flexion, protruding abdomen, edema in legs    Gait: anterior lean, head to the L in side flexion, less toe push off and less hip flexion    Balance: R LE single leg stand= 3 sec, L LE single leg stand = 8 sec, hip drop in single leg stand on both sides; R tibia turns out laterally mostly vs L.     Sensation: Sensation intact to light touch B  LE's     Cervical ROM  Flexion 35  Extension 40  R side flexion 40  L side flexion  45  R rotation 70  L rotation 80    Shoulder ROM is limited with extension R at 40 and 45 degrees to the L  Internal/external is full but L internal rotation is at 60 degrees but 70 on L    Knee AROM:     (R) (L)     Flexion   120 124     Extension  10 0          Hip AROM:  Flexion   90 100     ER   20 20     IR   20 20     ABD   20 20    Ankle AROM:  Ankle DF(davey) 0 0         Strength:     R L  Hip flexors L2   4+/5 4+/5  Quadriceps L3   5/5 5/5     Hamstrings S1  4/5 3+/5    Dorsiflexion L4  5/5 5/5      Plantar flexion S1  2/5 2/5    Gluteus Medius L45S1 3+/5 4/5    Gluteus Rashid L5S12 3+/5 4/5      Joint Mobility/palpation: multiple trigger points in the quads, calves and PT noted mild patellar tracking lateral and R hip externally rotated. Tight lateral hamstring as well.      Special Test: Patellar tapping -, pop fossa edema is mild.        TREATMENT   Treatment Time In: 2:20 pm  Treatment Time Out: 3: 00 pm  Total Treatment time separate from Evaluation: 40 minutes    Teresa received the following manual therapy techniques: Soft tissue mobs and Joint mobilizations were applied to the: R for 8 minutes, including:  Patellar mobs  Soft tissue mobs to the quad trigger points    Teresa participated in neuromuscular re-education activities to improve: Balance, Kinesthetic, Proprioception, and Posture for 32 minutes. The following activities were included:    Standing posterior pelvic tilt 3x 10  Standing trunk flexion 2x 5  Posterior pelvic tilt 2x 10 standing then hook lying 1x 20  Quad sets R 2 x20; L 1x 10   SLR R 2x 10  Prone hamstring stretch  R 2x 10 then add PPT with to support low back  Bed mobility training    Home Exercises and Patient Education Provided    Education provided:   -Education on condition, HEP, and PT educated pt on tasks     Written Home Exercises Provided: Patient instructed to cont prior HEP.  Exercises  were reviewed and Teresa was able to demonstrate them prior to the end of the session.  Teresa demonstrated good  understanding of the education provided.     See EMR under Patient Instructions for exercises provided 3/1/2024.    Assessment   Teresa is a 78 y.o. female referred to outpatient Physical Therapy with a medical diagnosis of   M17.0 (ICD-10-CM) - Primary osteoarthritis of both knees   M19.011,M19.012 (ICD-10-CM) - Primary osteoarthritis of both shoulders   . The patient presents with signs and symptoms consistent with diagnosis along with knee pain, knee ROM loss, neck ROM loss and impairments which include impairments list: ROM, strength, endurance, joint mobility, muscle length, balance, posture, gait mechanics, core strength and stability, and functional movement patterns.  These impairments are limiting patient's ability to walk on level and unlevel terri and perform standing without a lot of pain. At times interrupts her sleep.     Pt prognosis is Good.   Pt will benefit from skilled outpatient Physical Therapy to address the deficits stated above and in the chart below, provide pt/family education, and to maximize pt's level of independence.     Plan of care discussed with patient: Yes  Pt's spiritual, cultural and educational needs considered and patient is agreeable to the plan of care and goals as stated below:     Anticipated Barriers for therapy: none    Medical Necessity is demonstrated by the following  History  Co-morbidities and personal factors that may impact the plan of care [x] LOW: no personal factors / co-morbidities  [] MODERATE: 1-2 personal factors / co-morbidities  [] HIGH: 3+ personal factors / co-morbidities    Moderate / High Support Documentation:   Co-morbidities affecting plan of care: na    Personal Factors:   lifestyle     Examination  Body Structures and Functions, activity limitations and participation restrictions that may impact the plan of care [x] LOW: addressing 1-2  elements  [] MODERATE: 3+ elements  [] HIGH: 4+ elements (please support below)    Moderate / High Support Documentation: na     Clinical Presentation [x] LOW: stable  [] MODERATE: Evolving  [] HIGH: Unstable     Decision Making/ Complexity Score: low         Goals:  Short Term Goals: In 4 weeks:  1.Pt to be educated on HEP.  2.Patient to demo increased AROM to knee flexion to 125 or better  3.Patient to increase strength hip abduction to 4/5 or better.  4.Patient to have decreased pain to 4/10 or better.  5.Patient to increase LE balance to single leg stand up to 15 sec or better.  6.Patient to improve score on the FOTO by 10%.      Long Term Goals: In 10 weeks  1. Patient to perform daily activities including sit to stand without guarding and dead lifting up to 30 # for home management.  2. Patient to demonstrate hip abduction strength to 5/5.  3. Patient to have decreased pain to 3/10 or better in the R knee.  5. Patient to improve score on the FOTO to 44.      Plan   Plan of care Certification: 3/1/2024 to 5/9/24.    Outpatient Physical Therapy 2 times weekly for 10 weeks to include the following interventions: Cervical/Lumbar Traction, Electrical Stimulation IFC, NMES, Gait Training, Manual Therapy, Moist Heat/ Ice, Neuromuscular Re-ed, Patient Education, Self Care, Therapeutic Activities, Therapeutic Exercise, and DN.     Daphne Mccollum, PT, CIDN, SFMA    Thank you for this referral.    These services are reasonable and necessary for the conditions set forth above while under my care.

## 2024-03-04 ENCOUNTER — CLINICAL SUPPORT (OUTPATIENT)
Dept: REHABILITATION | Facility: HOSPITAL | Age: 79
End: 2024-03-04
Payer: MEDICARE

## 2024-03-04 DIAGNOSIS — M54.50 CHRONIC BILATERAL LOW BACK PAIN WITHOUT SCIATICA: Primary | ICD-10-CM

## 2024-03-04 DIAGNOSIS — G89.29 CHRONIC BILATERAL LOW BACK PAIN WITHOUT SCIATICA: Primary | ICD-10-CM

## 2024-03-04 PROCEDURE — 97112 NEUROMUSCULAR REEDUCATION: CPT | Mod: PN,CQ

## 2024-03-04 PROCEDURE — 97110 THERAPEUTIC EXERCISES: CPT | Mod: PN,CQ

## 2024-03-04 NOTE — PROGRESS NOTES
"[facility]   Physical Therapy Treatment Note      Name: Teresa Orozco  Clinic Number: [id number]    Therapy Diagnosis:   Encounter Diagnosis   Name Primary?    Chronic bilateral low back pain without sciatica Yes     Physician: Jaiden Joshi MD    Visit Date: 3/4/2024    Physician Orders: PT Eval and Treat   Medical Diagnosis from Referral:   M17.0 (ICD-10-CM) - Primary osteoarthritis of both knees   M19.011,M19.012 (ICD-10-CM) - Primary osteoarthritis of both shoulders      Evaluation Date: 3/1/2024  Authorization Period Expiration: 2/28/24  Plan of Care Expiration: 5/9/24  Visit # / Visits authorized: 1/ 1; 1/20  Foto1/3    PTA Visit #: 1/5     Time In: 12:34pm  Time Out: 1:30pm  Total Time: 54 minutes    Subjective     Patient reports: right knee is horrible today with pain. Patient reports having trouble walking this morning but as she continued through the day did better.   She was compliant with home exercise program.  Response to previous treatment: n/a  Functional change: n/a    Pain: 6/10  Location: right knee      Objective      Objective Measures updated at progress report unless specified.     Treatment     Teresa received the treatments listed below:      therapeutic exercises to develop strength, endurance, ROM, and flexibility for 23 minutes including:    Nustep 6 minutes  Seated hamstring stretch - 3 x20"  DKTC - 2 x10  Seated heel/toe raises - 3 x10    manual therapy techniques: Joint mobilizations, Manual traction, Myofacial release, and Soft tissue Mobilization were applied to the: right knee for 00 minutes, including:      neuromuscular re-education activities to improve: Balance, Coordination, Kinesthetic, and Proprioception for 30 minutes. The following activities were included:    Quad sets 5" 2 x10  SAQ; 3" 2 x10  LAQ; 3" 2 x10  SLR; 2 x10  Supine Clams; 2 x10  PPT; 2 x10    therapeutic activities to improve functional performance for 00  minutes, including:  Not " performed      Patient Education and Home Exercises       Education provided:   - pacing of exercise with rest breaks  - breathing throughout movement  -     Written Home Exercises Provided: Patient instructed to cont prior HEP. Exercises were reviewed and Teresa was able to demonstrate them prior to the end of the session.  Teresa demonstrated good  understanding of the education provided. See Electronic Medical Record under Patient Instructions for exercises provided during therapy sessions    Assessment     Today is patient's first treatment visit since initial evaluation. Introduced patient to hip and lower extremity strengthening activities within patient's current tolerance. Discussed with patient about the importance of consistency with homes exercises for greater likelihood of improvements. Also discussed with patient about proper pacing of activity including rest breaks, regular breathing, as well as avoidance of pain.     Teresa Is progressing well towards her goals.   Patient prognosis is Good.     Patient will continue to benefit from skilled outpatient physical therapy to address the deficits listed in the problem list box on initial evaluation, provide pt/family education and to maximize pt's level of independence in the home and community environment.     Patient's spiritual, cultural and educational needs considered and pt agreeable to plan of care and goals.     Anticipated barriers to physical therapy: none    Goals: Short Term Goals: In 4 weeks:  1.Pt to be educated on HEP.  2.Patient to demo increased AROM to knee flexion to 125 or better  3.Patient to increase strength hip abduction to 4/5 or better.  4.Patient to have decreased pain to 4/10 or better.  5.Patient to increase LE balance to single leg stand up to 15 sec or better.  6.Patient to improve score on the FOTO by 10%.    Long Term Goals: In 10 weeks  1. Patient to perform daily activities including sit to stand without guarding and dead  lifting up to 30 # for home management.  2. Patient to demonstrate hip abduction strength to 5/5.  3. Patient to have decreased pain to 3/10 or better in the R knee.  5. Patient to improve score on the FOTO to 44.        Plan   Plan of care Certification: 3/1/2024 to 5/9/24.     Outpatient Physical Therapy 2 times weekly for 10 weeks to include the following interventions: Cervical/Lumbar Traction, Electrical Stimulation IFC, NMES, Gait Training, Manual Therapy, Moist Heat/ Ice, Neuromuscular Re-ed, Patient Education, Self Care, Therapeutic Activities, Therapeutic Exercise, and DN.     Harpreet Amin, PTA

## 2024-03-07 ENCOUNTER — CLINICAL SUPPORT (OUTPATIENT)
Dept: REHABILITATION | Facility: HOSPITAL | Age: 79
End: 2024-03-07
Payer: MEDICARE

## 2024-03-07 DIAGNOSIS — G89.29 CHRONIC BILATERAL LOW BACK PAIN WITHOUT SCIATICA: Primary | ICD-10-CM

## 2024-03-07 DIAGNOSIS — M54.50 CHRONIC BILATERAL LOW BACK PAIN WITHOUT SCIATICA: Primary | ICD-10-CM

## 2024-03-07 PROCEDURE — 97110 THERAPEUTIC EXERCISES: CPT | Mod: PN,CQ

## 2024-03-07 PROCEDURE — 97112 NEUROMUSCULAR REEDUCATION: CPT | Mod: PN,CQ

## 2024-03-07 NOTE — PROGRESS NOTES
"[facility]   Physical Therapy Treatment Note      Name: Teresa Orozco  Clinic Number: [id number]    Therapy Diagnosis:   Encounter Diagnosis   Name Primary?    Chronic bilateral low back pain without sciatica Yes       Physician: Jaiden Joshi MD    Visit Date: 3/7/2024    Physician Orders: PT Eval and Treat   Medical Diagnosis from Referral:   M17.0 (ICD-10-CM) - Primary osteoarthritis of both knees   M19.011,M19.012 (ICD-10-CM) - Primary osteoarthritis of both shoulders      Evaluation Date: 3/1/2024  Authorization Period Expiration: 2/28/24  Plan of Care Expiration: 5/9/24  Visit # / Visits authorized: 1/ 1; 1/20  Foto1/3    PTA Visit #: 2/5     Time In: 12:34pm  Time Out: 1:30pm  Total Time: 30 minutes    Subjective     Patient reports: getting root canal yesterday resulting in overall tiredness. Patient reports minor improvements following last visit but continues to have pain in right knee.   She was compliant with home exercise program.  Response to previous treatment: slight decrease in knee pain  Functional change: n/a    Pain: 5/10  Location: right knee      Objective      Objective Measures updated at progress report unless specified.     Treatment     Teresa received the treatments listed below:      therapeutic exercises to develop strength, endurance, ROM, and flexibility for 12 minutes including:    Nustep 6 minutes  Seated hamstring stretch - 3 x20"  DKTC - 2 x10  Seated heel/toe raises - 3 x10    manual therapy techniques: Joint mobilizations, Manual traction, Myofacial release, and Soft tissue Mobilization were applied to the: right knee for 00 minutes, including:      neuromuscular re-education activities to improve: Balance, Coordination, Kinesthetic, and Proprioception for 18 minutes. The following activities were included:    Quad sets 5" 2 x10  SAQ; 3" 2 x10  LAQ; 3" 2 x10  SLR; 2 x10  Supine Clams; 2 x10  PPT; 2 x10  Bridges; 2 x10  Heel Digs into ball; 5" 2 " x10    therapeutic activities to improve functional performance for 00  minutes, including:  Not performed      Patient Education and Home Exercises       Education provided:     Written Home Exercises Provided: Patient instructed to cont prior HEP. Exercises were reviewed and Teresa was able to demonstrate them prior to the end of the session.  Teresa demonstrated good  understanding of the education provided. See Electronic Medical Record under Patient Instructions for exercises provided during therapy sessions    Assessment     Continued work of previous visit with minimal progression following subjective reports of slight improvements. Patient continues to demonstrate habit of holding her breath during exercises requiring minor to moderate cues to prevent. Patient demonstrated only limitation of right medial knee pain when attempting supine clams that required modification to relieve complaints. Patient demonstrates improvements in utilizing hip extensors for proper sit to stand mechanics after last visit as well.    Teresa Is progressing well towards her goals.   Patient prognosis is Good.     Patient will continue to benefit from skilled outpatient physical therapy to address the deficits listed in the problem list box on initial evaluation, provide pt/family education and to maximize pt's level of independence in the home and community environment.     Patient's spiritual, cultural and educational needs considered and pt agreeable to plan of care and goals.     Anticipated barriers to physical therapy: none    Goals: Short Term Goals: In 4 weeks:  1.Pt to be educated on HEP.  2.Patient to demo increased AROM to knee flexion to 125 or better  3.Patient to increase strength hip abduction to 4/5 or better.  4.Patient to have decreased pain to 4/10 or better.  5.Patient to increase LE balance to single leg stand up to 15 sec or better.  6.Patient to improve score on the FOTO by 10%.    Long Term Goals: In 10 weeks  1.  Patient to perform daily activities including sit to stand without guarding and dead lifting up to 30 # for home management.  2. Patient to demonstrate hip abduction strength to 5/5.  3. Patient to have decreased pain to 3/10 or better in the R knee.  5. Patient to improve score on the FOTO to 44.        Plan   Plan of care Certification: 3/1/2024 to 5/9/24.     Outpatient Physical Therapy 2 times weekly for 10 weeks to include the following interventions: Cervical/Lumbar Traction, Electrical Stimulation IFC, NMES, Gait Training, Manual Therapy, Moist Heat/ Ice, Neuromuscular Re-ed, Patient Education, Self Care, Therapeutic Activities, Therapeutic Exercise, and DN.     Harpreet Amin, PTA

## 2024-03-11 ENCOUNTER — CLINICAL SUPPORT (OUTPATIENT)
Dept: REHABILITATION | Facility: HOSPITAL | Age: 79
End: 2024-03-11
Payer: MEDICARE

## 2024-03-11 DIAGNOSIS — G89.29 CHRONIC BILATERAL LOW BACK PAIN WITHOUT SCIATICA: Primary | ICD-10-CM

## 2024-03-11 DIAGNOSIS — M54.50 CHRONIC BILATERAL LOW BACK PAIN WITHOUT SCIATICA: Primary | ICD-10-CM

## 2024-03-11 PROCEDURE — 97110 THERAPEUTIC EXERCISES: CPT | Mod: PN,CQ

## 2024-03-11 PROCEDURE — 97112 NEUROMUSCULAR REEDUCATION: CPT | Mod: PN,CQ

## 2024-03-11 NOTE — PROGRESS NOTES
"[facility]   Physical Therapy Treatment Note      Name: Teresa Orozco  Clinic Number: [id number]    Therapy Diagnosis:   Encounter Diagnosis   Name Primary?    Chronic bilateral low back pain without sciatica Yes       Physician: Jaiden Joshi MD    Visit Date: 3/11/2024    Physician Orders: PT Eval and Treat   Medical Diagnosis from Referral:   M17.0 (ICD-10-CM) - Primary osteoarthritis of both knees   M19.011,M19.012 (ICD-10-CM) - Primary osteoarthritis of both shoulders      Evaluation Date: 3/1/2024  Authorization Period Expiration: 2/28/24  Plan of Care Expiration: 5/9/24  Visit # / Visits authorized: 1/ 1; 1/20  Foto1/3    PTA Visit #: 2/5     Time In: 12:32pm  Time Out: 1:30pm  Total Time: 29 minutes    Subjective     Patient reports: continuing to have  pain in root canal as well as behind right knee. Patient reports going out with friends yesterday afternoon and had no issues throughout.   She was compliant with home exercise program.  Response to previous treatment: n/a  Functional change: n/a    Pain: 5/10  Location: right knee      Objective      Objective Measures updated at progress report unless specified.     Treatment     Teresa received the treatments listed below:      therapeutic exercises to develop strength, endurance, ROM, and flexibility for 10 minutes including:    Nustep 6 minutes  Seated hamstring stretch - 3 x20"  DKTC - 2 x10  Seated heel/toe raises - 3 x10    manual therapy techniques: Joint mobilizations, Manual traction, Myofacial release, and Soft tissue Mobilization were applied to the: right knee for 00 minutes, including:      neuromuscular re-education activities to improve: Balance, Coordination, Kinesthetic, and Proprioception for 19 minutes. The following activities were included:    Quad sets 5" 2 x10 towel roll under ankle  SAQ; 5" 3 x10  LAQ; 5" 2 x10  SLR; 2 x10  Supine Clams; 2 x10  PPT; 2 x10  Bridges; 2 x10  Heel Digs into ball; 5" 2 " x10    therapeutic activities to improve functional performance for 00  minutes, including:  Not performed    Moist heat to medial thigh and ice to medial knee post treatment for 10 minutes    Patient Education and Home Exercises       Education provided:     Written Home Exercises Provided: Patient instructed to cont prior HEP. Exercises were reviewed and Teresa was able to demonstrate them prior to the end of the session.  Teresa demonstrated good  understanding of the education provided. See Electronic Medical Record under Patient Instructions for exercises provided during therapy sessions    Assessment     Continued progression of treatment attempting to improve strength and tolerance to activity as patient is successfully able to complete. Patient continues to demonstrate limitations of medial knee pain motioning at times inferior or superior to joint line. Patient demonstrates improvements with maintaining breathing throughout treatment. Included moist heat and ice to medial thigh musculature and knee post treatment in attempt to provide pain relief.     Teresa Is progressing well towards her goals.   Patient prognosis is Good.     Patient will continue to benefit from skilled outpatient physical therapy to address the deficits listed in the problem list box on initial evaluation, provide pt/family education and to maximize pt's level of independence in the home and community environment.     Patient's spiritual, cultural and educational needs considered and pt agreeable to plan of care and goals.     Anticipated barriers to physical therapy: none    Goals: Short Term Goals: In 4 weeks:  1.Pt to be educated on HEP.  2.Patient to demo increased AROM to knee flexion to 125 or better  3.Patient to increase strength hip abduction to 4/5 or better.  4.Patient to have decreased pain to 4/10 or better.  5.Patient to increase LE balance to single leg stand up to 15 sec or better.  6.Patient to improve score on the FOTO by  10%.    Long Term Goals: In 10 weeks  1. Patient to perform daily activities including sit to stand without guarding and dead lifting up to 30 # for home management.  2. Patient to demonstrate hip abduction strength to 5/5.  3. Patient to have decreased pain to 3/10 or better in the R knee.  5. Patient to improve score on the FOTO to 44.        Plan   Plan of care Certification: 3/1/2024 to 5/9/24.     Outpatient Physical Therapy 2 times weekly for 10 weeks to include the following interventions: Cervical/Lumbar Traction, Electrical Stimulation IFC, NMES, Gait Training, Manual Therapy, Moist Heat/ Ice, Neuromuscular Re-ed, Patient Education, Self Care, Therapeutic Activities, Therapeutic Exercise, and DN.     Harpreet Amin, PTA

## 2024-03-14 ENCOUNTER — CLINICAL SUPPORT (OUTPATIENT)
Dept: REHABILITATION | Facility: HOSPITAL | Age: 79
End: 2024-03-14
Payer: MEDICARE

## 2024-03-14 DIAGNOSIS — G89.29 CHRONIC BILATERAL LOW BACK PAIN WITHOUT SCIATICA: Primary | ICD-10-CM

## 2024-03-14 DIAGNOSIS — M54.50 CHRONIC BILATERAL LOW BACK PAIN WITHOUT SCIATICA: Primary | ICD-10-CM

## 2024-03-14 PROCEDURE — 97110 THERAPEUTIC EXERCISES: CPT | Mod: PN,CQ

## 2024-03-14 PROCEDURE — 97112 NEUROMUSCULAR REEDUCATION: CPT | Mod: PN,CQ

## 2024-03-14 NOTE — PROGRESS NOTES
"[facility]   Physical Therapy Treatment Note      Name: Teresa Orozco  Clinic Number: [id number]    Therapy Diagnosis:   Encounter Diagnosis   Name Primary?    Chronic bilateral low back pain without sciatica Yes       Physician: Jaiden Joshi MD    Visit Date: 3/14/2024    Physician Orders: PT Eval and Treat   Medical Diagnosis from Referral:   M17.0 (ICD-10-CM) - Primary osteoarthritis of both knees   M19.011,M19.012 (ICD-10-CM) - Primary osteoarthritis of both shoulders      Evaluation Date: 3/1/2024  Authorization Period Expiration: 2/28/24  Plan of Care Expiration: 5/9/24  Visit # / Visits authorized: 1/ 1; 4/20  Foto1/3    PTA Visit #: 3/5     Time In: 12:33pm  Time Out: 1:30pm  Total Time: 29 minutes billable time with PTA    Subjective     Patient reports: did fine after last visit in low back and knee until yesterday. Patient reports having to walk across the parking lot for a MD appointment and when she attempted to walk back to the car after had an increase in knee pain that lasted for the rest of the day into today.  She was compliant with home exercise program.  Response to previous treatment: workout soreness  Functional change: n/a    Pain: 6/10  Location: right knee      Objective      Objective Measures updated at progress report unless specified.     Treatment     Teresa received the treatments listed below:      therapeutic exercises to develop strength, endurance, ROM, and flexibility for 10 minutes including:    Nustep 6 minutes  Seated hamstring stretch - 3 x20"  DKTC - 2 x10  Seated heel/toe raises - 3 x10    manual therapy techniques: Joint mobilizations, Manual traction, Myofacial release, and Soft tissue Mobilization were applied to the: right knee for 06 minutes, including:    Right knee PROM  Soft Tissue Mobilization to thigh musculature  Soft Tissue Mobilization around patellar  Gentle patellar mobs    neuromuscular re-education activities to improve: Balance, " "Coordination, Kinesthetic, and Proprioception for 13 minutes. The following activities were included:    Quad sets 5" 2 x10 towel roll under ankle  SAQ; 5" 3 x6  LAQ; 5" 3 x5  SLR; 3 x5  Supine Clams; 2 x10 - NT  PPT; 2 x10  Bridges; 3 x 5  Heel Digs into ball; 5" 2 x10    therapeutic activities to improve functional performance for 00  minutes, including:  Not performed      Moist heat to medial thigh and ice to medial knee post treatment for 00 minutes    Patient Education and Home Exercises       Education provided:     Written Home Exercises Provided: Patient instructed to cont prior HEP. Exercises were reviewed and Teresa was able to demonstrate them prior to the end of the session.  Teresa demonstrated good  understanding of the education provided. See Electronic Medical Record under Patient Instructions for exercises provided during therapy sessions    Assessment     Treatment modified today following patient's subjective reports of increased knee pain from walking yesterday.  Included manual interventions to right knee/lower extremity at end of treatment session. Patient demonstrated improvements in decreased hesitation with AROM of Right Lower Extremity post manual as well as decreased antalgic gait upon leaving. Discussed with patient about potentially utilizing the cane she has to decrease load into Right Lower Extremity when attempting to be functional outside of the house.    Teresa Is progressing well towards her goals.   Patient prognosis is Good.     Patient will continue to benefit from skilled outpatient physical therapy to address the deficits listed in the problem list box on initial evaluation, provide pt/family education and to maximize pt's level of independence in the home and community environment.     Patient's spiritual, cultural and educational needs considered and pt agreeable to plan of care and goals.     Anticipated barriers to physical therapy: none    Goals: Short Term Goals: In 4 " weeks:  1.Pt to be educated on HEP.  2.Patient to demo increased AROM to knee flexion to 125 or better  3.Patient to increase strength hip abduction to 4/5 or better.  4.Patient to have decreased pain to 4/10 or better.  5.Patient to increase LE balance to single leg stand up to 15 sec or better.  6.Patient to improve score on the FOTO by 10%.    Long Term Goals: In 10 weeks  1. Patient to perform daily activities including sit to stand without guarding and dead lifting up to 30 # for home management.  2. Patient to demonstrate hip abduction strength to 5/5.  3. Patient to have decreased pain to 3/10 or better in the R knee.  5. Patient to improve score on the FOTO to 44.        Plan   Plan of care Certification: 3/1/2024 to 5/9/24.     Outpatient Physical Therapy 2 times weekly for 10 weeks to include the following interventions: Cervical/Lumbar Traction, Electrical Stimulation IFC, NMES, Gait Training, Manual Therapy, Moist Heat/ Ice, Neuromuscular Re-ed, Patient Education, Self Care, Therapeutic Activities, Therapeutic Exercise, and DN.     Harpreet Amin, PTA

## 2024-03-18 ENCOUNTER — CLINICAL SUPPORT (OUTPATIENT)
Dept: REHABILITATION | Facility: HOSPITAL | Age: 79
End: 2024-03-18
Payer: MEDICARE

## 2024-03-18 DIAGNOSIS — M54.50 CHRONIC BILATERAL LOW BACK PAIN WITHOUT SCIATICA: Primary | ICD-10-CM

## 2024-03-18 DIAGNOSIS — G89.29 CHRONIC BILATERAL LOW BACK PAIN WITHOUT SCIATICA: Primary | ICD-10-CM

## 2024-03-18 PROCEDURE — 97112 NEUROMUSCULAR REEDUCATION: CPT | Mod: PN,CQ

## 2024-03-18 PROCEDURE — 97110 THERAPEUTIC EXERCISES: CPT | Mod: PN,CQ

## 2024-03-18 NOTE — PROGRESS NOTES
"[facility]   Physical Therapy Treatment Note      Name: Teresa Orozco  Clinic Number: [id number]    Therapy Diagnosis:   Encounter Diagnosis   Name Primary?    Chronic bilateral low back pain without sciatica Yes       Physician: Jaiden Joshi MD    Visit Date: 3/18/2024    Physician Orders: PT Eval and Treat   Medical Diagnosis from Referral:   M17.0 (ICD-10-CM) - Primary osteoarthritis of both knees   M19.011,M19.012 (ICD-10-CM) - Primary osteoarthritis of both shoulders      Evaluation Date: 3/1/2024  Authorization Period Expiration: 2/28/24  Plan of Care Expiration: 5/9/24  Visit # / Visits authorized: 1/ 1; 5/20  Foto1/3    PTA Visit #: 5/5     Time In: 12:31pm  Time Out: 1:30pm  Total Time: 29 minutes billable time with PTA    Subjective     Patient reports: following last visit was able to sleep Wednesday to Friday night without right knee bothering her. Patient reports Saturday was doing really well with overall pain and completed errands throughout the day. Patient reports used cane on left when her right knee began to bother her a little. Patient reports since yesterday and today though having a return of the knee pain.  She was compliant with home exercise program.  Response to previous treatment: workout soreness  Functional change: n/a    Pain: 4/10  Location: right knee      Objective      Objective Measures updated at progress report unless specified.     Treatment     Teresa received the treatments listed below:      therapeutic exercises to develop strength, endurance, ROM, and flexibility for 09 minutes including:    Nustep 6 minutes  Seated hamstring stretch - 3 x20"  DKTC - 2 x10  Seated heel/toe raises - 3 x10    manual therapy techniques: Joint mobilizations, Manual traction, Myofacial release, and Soft tissue Mobilization were applied to the: right knee for 06 minutes, including:    Right knee PROM  Soft Tissue Mobilization to thigh musculature  Soft Tissue " "Mobilization around patellar  Gentle patellar mobs    neuromuscular re-education activities to improve: Balance, Coordination, Kinesthetic, and Proprioception for 12 minutes. The following activities were included:    Quad sets 5" x15 towel roll under ankle  SAQ; 5" 3 x6  LAQ; 5" 3 x5  SLR; 2 x10  PPT; 3 x10  Bridges; 3 x 6  Heel Digs into ball; 5" 2 x10  Sidelying clams; 2 x10 each  Tandem Stance 2 x15" each    therapeutic activities to improve functional performance for 04  minutes, including:  Step ups no riser; BUE A as needed x10 each      Moist heat to medial thigh and ice to medial knee post treatment for 00 minutes    Patient Education and Home Exercises       Education provided:     Written Home Exercises Provided: Patient instructed to cont prior HEP. Exercises were reviewed and Teresa was able to demonstrate them prior to the end of the session.  Teresa demonstrated good  understanding of the education provided. See Electronic Medical Record under Patient Instructions for exercises provided during therapy sessions    Assessment     Continued work of previous visit following patient's subjective reports of improvements that lasted for several days. Patient is limited with tolerance to standing exercises to minimize exacerbations of knee pain.  Progressed a few of last visit's lower extremity exercises that were modified previously to help knee. Patient was successfully able to complete prescribed activities and increased reps of lower extremity strengthening activities without limitations of medial knee pain being present.    Teresa Is progressing well towards her goals.   Patient prognosis is Good.     Patient will continue to benefit from skilled outpatient physical therapy to address the deficits listed in the problem list box on initial evaluation, provide pt/family education and to maximize pt's level of independence in the home and community environment.     Patient's spiritual, cultural and educational " needs considered and pt agreeable to plan of care and goals.     Anticipated barriers to physical therapy: none    Goals: Short Term Goals: In 4 weeks:  1.Pt to be educated on HEP.  2.Patient to demo increased AROM to knee flexion to 125 or better  3.Patient to increase strength hip abduction to 4/5 or better.  4.Patient to have decreased pain to 4/10 or better.  5.Patient to increase LE balance to single leg stand up to 15 sec or better.  6.Patient to improve score on the FOTO by 10%.    Long Term Goals: In 10 weeks  1. Patient to perform daily activities including sit to stand without guarding and dead lifting up to 30 # for home management.  2. Patient to demonstrate hip abduction strength to 5/5.  3. Patient to have decreased pain to 3/10 or better in the R knee.  5. Patient to improve score on the FOTO to 44.        Plan   Plan of care Certification: 3/1/2024 to 5/9/24.     Outpatient Physical Therapy 2 times weekly for 10 weeks to include the following interventions: Cervical/Lumbar Traction, Electrical Stimulation IFC, NMES, Gait Training, Manual Therapy, Moist Heat/ Ice, Neuromuscular Re-ed, Patient Education, Self Care, Therapeutic Activities, Therapeutic Exercise, and DN.     Harpreet Amin, PTA

## 2024-03-19 NOTE — TELEPHONE ENCOUNTER
No care due was identified.  Health [facility] Embedded Care Due Messages. Reference number: [id number].   3/19/2024 4:02:35 PM CDT

## 2024-03-19 NOTE — TELEPHONE ENCOUNTER
LOV 02/28/2024   Detail Level: Detailed Sunscreen Recommendations: It was recommend that sunscreens be applied on a daily basis. This should be applied an hour before exposure and then every two hours if out for an extended period of time. SPF 30 or greater is recommended. There are mineral sunscreens and chemical sun screens. The mineral sunscreens sometimes are difficult to use due to the white color. Top rated sunscreen include La Roche Posay, Blue Lizzard, Elta MD and Neutrogena Ultra Dry. Detail Level: Simple

## 2024-03-20 RX ORDER — PROPRANOLOL HYDROCHLORIDE 120 MG/1
120 CAPSULE, EXTENDED RELEASE ORAL DAILY
Qty: 90 CAPSULE | Refills: 3 | Status: SHIPPED | OUTPATIENT
Start: 2024-03-20

## 2024-03-21 ENCOUNTER — CLINICAL SUPPORT (OUTPATIENT)
Dept: REHABILITATION | Facility: HOSPITAL | Age: 79
End: 2024-03-21
Payer: MEDICARE

## 2024-03-21 DIAGNOSIS — M19.012 PRIMARY OSTEOARTHRITIS OF BOTH SHOULDERS: ICD-10-CM

## 2024-03-21 DIAGNOSIS — M17.0 PRIMARY OSTEOARTHRITIS OF BOTH KNEES: ICD-10-CM

## 2024-03-21 DIAGNOSIS — M19.011 PRIMARY OSTEOARTHRITIS OF BOTH SHOULDERS: ICD-10-CM

## 2024-03-21 DIAGNOSIS — G89.29 CHRONIC BILATERAL LOW BACK PAIN WITHOUT SCIATICA: Primary | ICD-10-CM

## 2024-03-21 DIAGNOSIS — M54.50 CHRONIC BILATERAL LOW BACK PAIN WITHOUT SCIATICA: Primary | ICD-10-CM

## 2024-03-21 PROCEDURE — 97110 THERAPEUTIC EXERCISES: CPT | Mod: PN

## 2024-03-21 PROCEDURE — 97112 NEUROMUSCULAR REEDUCATION: CPT | Mod: PN

## 2024-03-21 NOTE — PROGRESS NOTES
[facility]   Physical Therapy Treatment Note     Name: Teresa Orozco  Clinic Number: [id number]    Therapy Diagnosis:   Encounter Diagnoses   Name Primary?    Chronic bilateral low back pain without sciatica Yes    Primary osteoarthritis of both knees     Primary osteoarthritis of both shoulders      Physician: Jaiden Joshi MD    Visit Date: 3/21/2024    Physician Orders: PT Eval and Treat   Medical Diagnosis from Referral:   M17.0 (ICD-10-CM) - Primary osteoarthritis of both knees   M19.011,M19.012 (ICD-10-CM) - Primary osteoarthritis of both shoulders      Evaluation Date: 3/1/2024  Authorization Period Expiration: 2/28/24  Plan of Care Expiration: 5/9/24  Visit # / Visits authorized: 7/20  Foto1/3 do on follow up  Progress note due 30 days from 3/1/24    PTA Visit #: 0/5     Time In: 1:02 pm  Time Out: 2:00 pm  Total Time: 47 minutes billable time from 1:10-1:57 one on one with PT)    Subjective     Patient reports: she is hurting all over today.  She was compliant with home exercise program.  Response to previous treatment: workout soreness  Functional change: n/a    Pain: 7/10 low back ; 4/10 knee and neck  Location: right knee      Objective      Objective Measures updated at progress report unless specified.     Treatment     Teresa received the treatments listed below:      therapeutic exercises to develop strength, endurance, ROM, and flexibility for 15 minutes including:    Shuttle 4 bands 3 min  Seated tailgators 4 # 3 min  Seated LAQ 4# 3 sec hold 2x 10    Deferred:  Nustep 6 minutes  Seated hamstring strengthening  DKTC - 2 x10  Seated heel/toe raises - 3 x10    manual therapy techniques: Joint mobilizations, Manual traction, Myofacial release, and Soft tissue Mobilization were applied to the: right knee for 0 minutes, including:    Deferred:  Right knee PROM  Soft Tissue Mobilization to thigh musculature  Soft Tissue Mobilization around patellar  Gentle patellar  "mobs    neuromuscular re-education activities to improve: Balance, Coordination, Kinesthetic, and Proprioception for 43 minutes. The following activities were included:    PPT 2x 10  Rib lowering training  PPT with rib lowering and deep exhaling 2x 10  PPT with rib lowering with single leg table top hold 5x 10 sec per leg  Standing core engaged hip abduction 2x 10 B  Side lying R glut med clams 3x 10 R  PEC bridges 2x 10  Shrugs 10 # 3x 10  Sit to stand goblet hold 10 # 2x 10 with theraband with purple theraband engaging R hip abduction   Seated shoulder external rotation with red theraband (with scapula engaged) 2x 10      Deferred:  Quad sets 5" x15 towel roll under ankle  Bridges; 3 x 6  Heel Digs into ball; 5" 2 x10  Sidelying clams; 2 x10 each  Tandem Stance 2 x15" each    therapeutic activities to improve functional performance for 0  minutes, including:    Deferred:  Step ups no riser; BUE A as needed x10 each      Moist heat to medial thigh and ice to medial knee post treatment for 00 minutes    Patient Education and Home Exercises       Education provided:     Written Home Exercises Provided: Patient instructed to cont prior HEP. Exercises were reviewed and Teresa was able to demonstrate them prior to the end of the session.  Teresa demonstrated good  understanding of the education provided. See Electronic Medical Record under Patient Instructions for exercises provided during therapy sessions    Assessment   Patient was successfully able to reduce R medial knee pain with PT using purple band to engage the R lateral hip. PT noted the R quadratus lumborum was tight and the R hip hiked so PT added in more glute med work as well as PT training the pt to use the pelvis better. Pt complete prescribed activities and increased reps of lower extremity strengthening activities without limitations of medial knee pain being present.    Teresa Is progressing well towards her goals.   Patient prognosis is Good.     Patient " will continue to benefit from skilled outpatient physical therapy to address the deficits listed in the problem list box on initial evaluation, provide pt/family education and to maximize pt's level of independence in the home and community environment.     Patient's spiritual, cultural and educational needs considered and pt agreeable to plan of care and goals.     Anticipated barriers to physical therapy: none    Goals: Short Term Goals: In 4 weeks:  1.Pt to be educated on HEP. met  2.Patient to demo increased AROM to knee flexion to 125 or better. progressing  3.Patient to increase strength hip abduction to 4/5 or better.  4.Patient to have decreased pain to 4/10 or better.  5.Patient to increase LE balance to single leg stand up to 15 sec or better.  6.Patient to improve score on the FOTO by 10%.    Long Term Goals: In 10 weeks  1. Patient to perform daily activities including sit to stand without guarding and dead lifting up to 30 # for home management.  2. Patient to demonstrate hip abduction strength to 5/5.  3. Patient to have decreased pain to 3/10 or better in the R knee.  5. Patient to improve score on the FOTO to 44.        Plan   Plan of care Certification: 3/1/2024 to 5/9/24.     Outpatient Physical Therapy 2 times weekly for 10 weeks to include the following interventions: Cervical/Lumbar Traction, Electrical Stimulation IFC, NMES, Gait Training, Manual Therapy, Moist Heat/ Ice, Neuromuscular Re-ed, Patient Education, Self Care, Therapeutic Activities, Therapeutic Exercise, and DN.     Daphne Mccollum, PT

## 2024-03-25 ENCOUNTER — CLINICAL SUPPORT (OUTPATIENT)
Dept: REHABILITATION | Facility: HOSPITAL | Age: 79
End: 2024-03-25
Payer: MEDICARE

## 2024-03-25 DIAGNOSIS — R60.9 EDEMA, UNSPECIFIED TYPE: ICD-10-CM

## 2024-03-25 DIAGNOSIS — G89.29 CHRONIC BILATERAL LOW BACK PAIN WITHOUT SCIATICA: Primary | ICD-10-CM

## 2024-03-25 DIAGNOSIS — M54.50 CHRONIC BILATERAL LOW BACK PAIN WITHOUT SCIATICA: Primary | ICD-10-CM

## 2024-03-25 PROCEDURE — 97110 THERAPEUTIC EXERCISES: CPT | Mod: PN,CQ

## 2024-03-25 PROCEDURE — 97112 NEUROMUSCULAR REEDUCATION: CPT | Mod: PN,CQ

## 2024-03-25 NOTE — PROGRESS NOTES
"[facility]   Physical Therapy Treatment Note     Name: Teresa Orozco  Clinic Number: [id number]    Therapy Diagnosis:   Encounter Diagnosis   Name Primary?    Chronic bilateral low back pain without sciatica Yes     Physician: Jaiden Joshi MD    Visit Date: 3/25/2024    Physician Orders: PT Eval and Treat   Medical Diagnosis from Referral:   M17.0 (ICD-10-CM) - Primary osteoarthritis of both knees   M19.011,M19.012 (ICD-10-CM) - Primary osteoarthritis of both shoulders      Evaluation Date: 3/1/2024  Authorization Period Expiration: 2/28/24  Plan of Care Expiration: 5/9/24  Visit # / Visits authorized: 7/20  FOTO: 2/3 last performed 3/25/24  Progress note due 30 days from 3/1/24    PTA Visit #: 1/5     Time In: 12:34 pm  Time Out: 1:33 pm  Total Time: 59 minutes billable time    Subjective     Patient reports: feeling overall well today with only complaint of pain being in right knee.   She was compliant with home exercise program.  Response to previous treatment: soreness and tiredness  Functional change: n/a    Pain: 1/10 low back and neck; 2/10 knee    Objective      Objective Measures updated at progress report unless specified.     Treatment     Teresa received the treatments listed below:      therapeutic exercises to develop strength, endurance, ROM, and flexibility for 17 minutes including:    Nustep 6 minutes  Shuttle 4 bands 3 min  Seated tailgators 4 # 3 min  Seated LAQ 4# 3 sec hold 2x 10  Seated QL stretch; 3 x15" to left   Standing heel/toe raises    manual therapy techniques: Joint mobilizations, Manual traction, Myofacial release, and Soft tissue Mobilization were applied to the: right knee for 05 minutes, including:    Right knee PROM  Soft Tissue Mobilization to thigh musculature  Soft Tissue Mobilization around patellar  Gentle patellar mobs    neuromuscular re-education activities to improve: Balance, Coordination, Kinesthetic, and Proprioception for 39 minutes. The " following activities were included:    PPT 2x 10  PPT with abdominal bracing combo marches;2 x10  Standing core engaged hip abduction 2x 10 B  Side lying R glut med clams 3x 10 R  PEC bridges 2x 10  Sit to stand goblet hold 10 # 2x 10 with theraband with purple theraband engaging R hip abduction   Hip Hikes right off step; 2 x10    Not Today:  Seated shoulder external rotation with red theraband (with scapula engaged) 2x 10  Shrugs 10 # 3x 10    therapeutic activities to improve functional performance for 0  minutes, including:    Deferred:  Step ups no riser; BUE A as needed x10 each      Moist heat to medial thigh and ice to medial knee post treatment for 00 minutes    Patient Education and Home Exercises       Education provided:     Written Home Exercises Provided: Patient instructed to cont prior HEP. Exercises were reviewed and Teresa was able to demonstrate them prior to the end of the session.  Teresa demonstrated good  understanding of the education provided. See Electronic Medical Record under Patient Instructions for exercises provided during therapy sessions    Assessment     Continued work of previous visit attempting to improve strength throughout back and lower extremities. Included quadratus lumborum stretching as well as standing hip hiking to treatment today in attempt to relieve myofascial tension present in right lumbar. Patient was able to complete all prescribed activities without limitations of pain being present. Patient did demonstrate pain in bilateral knees inferior to patellars following sit to stands but after a momentary break was able to stand and walk without antalgic gait being present.    Teersa Is progressing well towards her goals.   Patient prognosis is Good.     Patient will continue to benefit from skilled outpatient physical therapy to address the deficits listed in the problem list box on initial evaluation, provide pt/family education and to maximize pt's level of independence in  the home and community environment.     Patient's spiritual, cultural and educational needs considered and pt agreeable to plan of care and goals.     Anticipated barriers to physical therapy: none    Goals: Short Term Goals: In 4 weeks:  1.Pt to be educated on HEP. met  2.Patient to demo increased AROM to knee flexion to 125 or better. progressing  3.Patient to increase strength hip abduction to 4/5 or better.  4.Patient to have decreased pain to 4/10 or better.  5.Patient to increase LE balance to single leg stand up to 15 sec or better.  6.Patient to improve score on the FOTO by 10%.    Long Term Goals: In 10 weeks  1. Patient to perform daily activities including sit to stand without guarding and dead lifting up to 30 # for home management.  2. Patient to demonstrate hip abduction strength to 5/5.  3. Patient to have decreased pain to 3/10 or better in the R knee.  5. Patient to improve score on the FOTO to 44.        Plan   Plan of care Certification: 3/1/2024 to 5/9/24.     Outpatient Physical Therapy 2 times weekly for 10 weeks to include the following interventions: Cervical/Lumbar Traction, Electrical Stimulation IFC, NMES, Gait Training, Manual Therapy, Moist Heat/ Ice, Neuromuscular Re-ed, Patient Education, Self Care, Therapeutic Activities, Therapeutic Exercise, and DN.     Harpreet Amin, PTA

## 2024-03-25 NOTE — TELEPHONE ENCOUNTER
No care due was identified.  Health [facility] Embedded Care Due Messages. Reference number: [id number].   3/25/2024 9:06:35 AM CDT

## 2024-03-26 NOTE — TELEPHONE ENCOUNTER
Refill Routing Note   Medication(s) are not appropriate for processing by [facility] for the following reason(s):        Drug-disease interaction    [facility] action(s):  Defer        Medication Therapy Plan: hydroCHLOROthiazide and Hypokalemia    Pharmacist review requested: Yes     Appointments  past 12m or future 3m with PCP    Date Provider   Last Visit   2/28/2024 Jaiden Joshi MD   Next Visit   5/28/2024 Jaiden Joshi MD   ED visits in past 90 days: 0        Note composed:12:03 PM 03/26/2024

## 2024-03-27 RX ORDER — HYDROCHLOROTHIAZIDE 25 MG/1
25 TABLET ORAL
Qty: 90 TABLET | Refills: 3 | Status: SHIPPED | OUTPATIENT
Start: 2024-03-27

## 2024-03-28 ENCOUNTER — CLINICAL SUPPORT (OUTPATIENT)
Dept: REHABILITATION | Facility: HOSPITAL | Age: 79
End: 2024-03-28
Payer: MEDICARE

## 2024-03-28 DIAGNOSIS — G89.29 CHRONIC BILATERAL LOW BACK PAIN WITHOUT SCIATICA: Primary | ICD-10-CM

## 2024-03-28 DIAGNOSIS — M54.50 CHRONIC BILATERAL LOW BACK PAIN WITHOUT SCIATICA: Primary | ICD-10-CM

## 2024-03-28 PROCEDURE — 97110 THERAPEUTIC EXERCISES: CPT | Mod: PN,CQ

## 2024-03-28 PROCEDURE — 97112 NEUROMUSCULAR REEDUCATION: CPT | Mod: PN,CQ

## 2024-03-28 NOTE — PROGRESS NOTES
"[facility]   Physical Therapy Treatment Note     Name: Teresa Orozco  Clinic Number: [id number]    Therapy Diagnosis:   Encounter Diagnosis   Name Primary?    Chronic bilateral low back pain without sciatica Yes     Physician: Jaiden Joshi MD    Visit Date: 3/28/2024    Physician Orders: PT Eval and Treat   Medical Diagnosis from Referral:   M17.0 (ICD-10-CM) - Primary osteoarthritis of both knees   M19.011,M19.012 (ICD-10-CM) - Primary osteoarthritis of both shoulders      Evaluation Date: 3/1/2024  Authorization Period Expiration: 2/28/24  Plan of Care Expiration: 5/9/24  Visit # / Visits authorized: 8/20  FOTO: 2/3 last performed 3/25/24  Progress note due 30 days from 3/1/24    PTA Visit #: 1/5     Time In: 12:32 pm  Time Out: 1:30 pm  Total Time: 32 minutes billable time    Subjective     Patient reports: hurting a fair amount all of over the day after last visit. Patient reports going all day yesterday with no pain at all that has continued into today. Patient reports continuing to find pain in right medial knee.   She was compliant with home exercise program.  Response to previous treatment: workout soreness  Functional change: n/a    Pain: 0/10 low back and neck; 1/10 knee    Objective      Objective Measures updated at progress report unless specified.     Treatment     Teresa received the treatments listed below:      therapeutic exercises to develop strength, endurance, ROM, and flexibility for 12 minutes including:    Nustep 6 minutes  Shuttle 4 bands 3 min  Seated tailgators 4 # 3 min  Seated LAQ 4# 3 sec hold 2x 10  Seated QL stretch; 3 x15" to left   Standing heel/toe raises    manual therapy techniques: Joint mobilizations, Manual traction, Myofacial release, and Soft tissue Mobilization were applied to the: right knee for 05 minutes, including:    Right knee PROM  Soft Tissue Mobilization to thigh musculature  Soft Tissue Mobilization around patellar  Gentle patellar " mobs    neuromuscular re-education activities to improve: Balance, Coordination, Kinesthetic, and Proprioception for 15 minutes. The following activities were included:    PPT with abdominal bracing combo marches;2 x10  Standing core engaged hip abduction 2x 10 B  Side lying R glut med clams 3x 10 R  PEC bridges 2x 10  SLR; 3 x6  Sit to stand goblet hold 10 # 2x 10 with theraband with purple theraband engaging R hip abduction   Hip Hikes right off step; 2 x10    therapeutic activities to improve functional performance for 0  minutes, including:    Deferred:  Step ups no riser; BUE A as needed x10 each      Moist heat to medial thigh and ice to medial knee post treatment for 00 minutes    Patient Education and Home Exercises       Education provided:     Written Home Exercises Provided: Patient instructed to cont prior HEP. Exercises were reviewed and Teresa was able to demonstrate them prior to the end of the session.  Teresa demonstrated good  understanding of the education provided. See Electronic Medical Record under Patient Instructions for exercises provided during therapy sessions    Assessment     Patient demonstrates decreased antalgic gait while weightbearing through Right Lower Extremity today compared to last visit. Patient demonstrates a popping in right medial patellar when reaching full extension and initiating flexion from extension but denied pain. Treatment not progressed from last visit following patient's subjective reports of pain all over followed by no pain at all. Patient was able to complete all prescribed activities without limitations of pain being present.     Teresa Is progressing well towards her goals.   Patient prognosis is Good.     Patient will continue to benefit from skilled outpatient physical therapy to address the deficits listed in the problem list box on initial evaluation, provide pt/family education and to maximize pt's level of independence in the home and community environment.      Patient's spiritual, cultural and educational needs considered and pt agreeable to plan of care and goals.     Anticipated barriers to physical therapy: none    Goals: Short Term Goals: In 4 weeks:  1.Pt to be educated on HEP. met  2.Patient to demo increased AROM to knee flexion to 125 or better. progressing  3.Patient to increase strength hip abduction to 4/5 or better.  4.Patient to have decreased pain to 4/10 or better.  5.Patient to increase LE balance to single leg stand up to 15 sec or better.  6.Patient to improve score on the FOTO by 10%.    Long Term Goals: In 10 weeks  1. Patient to perform daily activities including sit to stand without guarding and dead lifting up to 30 # for home management.  2. Patient to demonstrate hip abduction strength to 5/5.  3. Patient to have decreased pain to 3/10 or better in the R knee.  5. Patient to improve score on the FOTO to 44.        Plan   Plan of care Certification: 3/1/2024 to 5/9/24.     Outpatient Physical Therapy 2 times weekly for 10 weeks to include the following interventions: Cervical/Lumbar Traction, Electrical Stimulation IFC, NMES, Gait Training, Manual Therapy, Moist Heat/ Ice, Neuromuscular Re-ed, Patient Education, Self Care, Therapeutic Activities, Therapeutic Exercise, and DN.     Harpreet Amin, PTA

## 2024-04-01 ENCOUNTER — CLINICAL SUPPORT (OUTPATIENT)
Dept: REHABILITATION | Facility: HOSPITAL | Age: 79
End: 2024-04-01
Payer: MEDICARE

## 2024-04-01 DIAGNOSIS — G89.29 CHRONIC BILATERAL LOW BACK PAIN WITHOUT SCIATICA: Primary | ICD-10-CM

## 2024-04-01 DIAGNOSIS — M54.50 CHRONIC BILATERAL LOW BACK PAIN WITHOUT SCIATICA: Primary | ICD-10-CM

## 2024-04-01 PROCEDURE — 97530 THERAPEUTIC ACTIVITIES: CPT | Mod: PN,CQ

## 2024-04-01 PROCEDURE — 97112 NEUROMUSCULAR REEDUCATION: CPT | Mod: PN,CQ

## 2024-04-01 PROCEDURE — 97110 THERAPEUTIC EXERCISES: CPT | Mod: PN,CQ

## 2024-04-01 NOTE — PROGRESS NOTES
"[facility]   Physical Therapy Treatment Note     Name: Teresa Orozco  Clinic Number: [id number]    Therapy Diagnosis:   Encounter Diagnosis   Name Primary?    Chronic bilateral low back pain without sciatica Yes     Physician: Jaiden Joshi MD    Visit Date: 4/1/2024    Physician Orders: PT Eval and Treat   Medical Diagnosis from Referral:   M17.0 (ICD-10-CM) - Primary osteoarthritis of both knees   M19.011,M19.012 (ICD-10-CM) - Primary osteoarthritis of both shoulders      Evaluation Date: 3/1/2024  Authorization Period Expiration: 2/28/24  Plan of Care Expiration: 5/9/24  Visit # / Visits authorized: 9/20  FOTO: 2/3 last performed 3/25/24  Progress note due 30 days from 3/1/24    PTA Visit #: 2/5     Time In: 12:34 pm  Time Out: 1:30 pm  Total Time: 53 minutes billable time    Subjective     Patient reports: doing well today with minimal complaints of pain in right knee. Patient reports getting to interact with friends and family on Easter Sunday without any exacerbations of pain.  She was compliant with home exercise program.  Response to previous treatment: good workout  Functional change: n/a    Pain: 0/10 low back and neck; 1/10 knee    Objective      Objective Measures updated at progress report unless specified.     Treatment     Teresa received the treatments listed below:      therapeutic exercises to develop strength, endurance, ROM, and flexibility for 16 minutes including:    Nustep 6 minutes  Shuttle 4 bands 3 min  Seated LAQ 4# 3 sec hold 2x 10  Seated QL stretch; 3 x15" to left   Standing heel/toe raises; 3 x10  Slantboard gastroc stretch 3 x20"    manual therapy techniques: Joint mobilizations, Manual traction, Myofacial release, and Soft tissue Mobilization were applied to the: right knee for 00 minutes, including:    Right knee PROM  Soft Tissue Mobilization to thigh musculature  Soft Tissue Mobilization around patellar  Gentle patellar mobs    neuromuscular re-education " "activities to improve: Balance, Coordination, Kinesthetic, and Proprioception for 29 minutes. The following activities were included:    PPT with abdominal bracing combo marches;2 x10  Standing core engaged hip abduction 2x 10 B  Side lying R glut med clams 3x 10 R  PEC bridges 2x 10  SLR; 3 x6  Hip Hikes right off step; 2 x10  Tandem stance; 3 x15" each  Tandem Gait; 2 laps  Retrowalking; 2 laps    therapeutic activities to improve functional performance for 08  minutes, including:    Step ups no riser; BUE A as needed x10 each  Sit to stands from hi lo; 2 x10    Moist heat to medial thigh and ice to medial knee post treatment for 00 minutes    Patient Education and Home Exercises       Education provided:     Written Home Exercises Provided: Patient instructed to cont prior HEP. Exercises were reviewed and Teresa was able to demonstrate them prior to the end of the session.  Teresa demonstrated good  understanding of the education provided. See Electronic Medical Record under Patient Instructions for exercises provided during therapy sessions    Assessment     Continued work of previous visit focusing on attempting to continue improving mobility, stability, and tolerance to activity. Patient demonstrated minor limitation of right medial knee pain but overall improvements compared to previous sessions. Patient demonstrates improvements of abdominal bracing as well as breathing throughout exercises requiring minimal cueing. Plan to continue progression of treatment as patient is able tolerate.    Teresa Is progressing well towards her goals.   Patient prognosis is Good.     Patient will continue to benefit from skilled outpatient physical therapy to address the deficits listed in the problem list box on initial evaluation, provide pt/family education and to maximize pt's level of independence in the home and community environment.     Patient's spiritual, cultural and educational needs considered and pt agreeable to plan " of care and goals.     Anticipated barriers to physical therapy: none    Goals: Short Term Goals: In 4 weeks:  1.Pt to be educated on HEP. met  2.Patient to demo increased AROM to knee flexion to 125 or better. progressing  3.Patient to increase strength hip abduction to 4/5 or better.  4.Patient to have decreased pain to 4/10 or better.  5.Patient to increase LE balance to single leg stand up to 15 sec or better.  6.Patient to improve score on the FOTO by 10%.    Long Term Goals: In 10 weeks  1. Patient to perform daily activities including sit to stand without guarding and dead lifting up to 30 # for home management.  2. Patient to demonstrate hip abduction strength to 5/5.  3. Patient to have decreased pain to 3/10 or better in the R knee.  5. Patient to improve score on the FOTO to 44.        Plan   Plan of care Certification: 3/1/2024 to 5/9/24.     Outpatient Physical Therapy 2 times weekly for 10 weeks to include the following interventions: Cervical/Lumbar Traction, Electrical Stimulation IFC, NMES, Gait Training, Manual Therapy, Moist Heat/ Ice, Neuromuscular Re-ed, Patient Education, Self Care, Therapeutic Activities, Therapeutic Exercise, and DN.     Harpreet Amin, PTA

## 2024-04-04 ENCOUNTER — CLINICAL SUPPORT (OUTPATIENT)
Dept: REHABILITATION | Facility: HOSPITAL | Age: 79
End: 2024-04-04
Payer: MEDICARE

## 2024-04-04 DIAGNOSIS — G89.29 CHRONIC BILATERAL LOW BACK PAIN WITHOUT SCIATICA: Primary | ICD-10-CM

## 2024-04-04 DIAGNOSIS — M54.50 CHRONIC BILATERAL LOW BACK PAIN WITHOUT SCIATICA: Primary | ICD-10-CM

## 2024-04-04 PROCEDURE — 97112 NEUROMUSCULAR REEDUCATION: CPT | Mod: PN,CQ

## 2024-04-04 PROCEDURE — 97110 THERAPEUTIC EXERCISES: CPT | Mod: PN,CQ

## 2024-04-04 NOTE — PROGRESS NOTES
"[facility]   Physical Therapy Treatment Note     Name: Teresa Orozco  Clinic Number: [id number]    Therapy Diagnosis:   Encounter Diagnosis   Name Primary?    Chronic bilateral low back pain without sciatica Yes     Physician: Jaiden Joshi MD    Visit Date: 4/4/2024    Physician Orders: PT Eval and Treat   Medical Diagnosis from Referral:   M17.0 (ICD-10-CM) - Primary osteoarthritis of both knees   M19.011,M19.012 (ICD-10-CM) - Primary osteoarthritis of both shoulders      Evaluation Date: 3/1/2024  Authorization Period Expiration: 2/28/24  Plan of Care Expiration: 5/9/24  Visit # / Visits authorized: 10/20  FOTO: 2/3 last performed 3/25/24  Progress note due 30 days from 3/1/24    PTA Visit #: 4/5     Time In: 12:34 pm  Time Out: 1:30 pm  Total Time: 32 minutes billable time    Subjective     Patient reports: doing well today with minimal complaints of pain in right knee. Patient reports getting to interact with friends and family on Easter Sunday without any exacerbations of pain.  She was compliant with home exercise program.  Response to previous treatment: good workout  Functional change: n/a    Pain: 0/10 low back and neck; 1/10 knee    Objective      Objective Measures updated at progress report unless specified.     Treatment     Teresa received the treatments listed below:      therapeutic exercises to develop strength, endurance, ROM, and flexibility for 10 minutes including:    Nustep 6 minutes  Shuttle 5 bands 3 min  Seated LAQ 5# 3 sec hold 2x 10  Seated QL stretch; 3 x15" to left   Standing heel/toe raises; 3 x10  Slantboard gastroc stretch 3 x20"    manual therapy techniques: Joint mobilizations, Manual traction, Myofacial release, and Soft tissue Mobilization were applied to the: right knee for 00 minutes, including:    Right knee PROM  Soft Tissue Mobilization to thigh musculature  Soft Tissue Mobilization around patellar  Gentle patellar mobs    neuromuscular " "re-education activities to improve: Balance, Coordination, Kinesthetic, and Proprioception for 17 minutes. The following activities were included:    PPT with abdominal bracing combo marches;2 x10 Red Theraband   Standing core engaged hip abduction Yellow Theraband 2x 10 B  Side lying R glut med clams Red Theraband 3x 10 R  PEC bridges 2x 12  SLR; 2 x10  Hip Hikes right off step; 2 x10  Tandem stance; 3 x15" each  Tandem Gait; 2 laps  Retrowalking; 2 laps    therapeutic activities to improve functional performance for 05 minutes, including:    Step ups no riser; BUE A as needed x10 each  Sit to stands from hi lo; 2 x10    Moist heat to medial thigh and ice to medial knee post treatment for 00 minutes    Patient Education and Home Exercises       Education provided:     Written Home Exercises Provided: Patient instructed to cont prior HEP. Exercises were reviewed and Teresa was able to demonstrate them prior to the end of the session.  Teresa demonstrated good  understanding of the education provided. See Electronic Medical Record under Patient Instructions for exercises provided during therapy sessions    Assessment     Treatment progressed as patient demonstrated tolerance to with increased resistance of previous exercises. Patient demonstrates strength improvements with no limitations of pain present. Patient does demonstrate discomfort in right medial knee with standing activities and shuttle squats but denied exacerbations. Plan to continue strengthening into further function as tolerated.     Teresa Is progressing well towards her goals.   Patient prognosis is Good.     Patient will continue to benefit from skilled outpatient physical therapy to address the deficits listed in the problem list box on initial evaluation, provide pt/family education and to maximize pt's level of independence in the home and community environment.     Patient's spiritual, cultural and educational needs considered and pt agreeable to plan " of care and goals.     Anticipated barriers to physical therapy: none    Goals: Short Term Goals: In 4 weeks:  1.Pt to be educated on HEP. met  2.Patient to demo increased AROM to knee flexion to 125 or better. progressing  3.Patient to increase strength hip abduction to 4/5 or better.  4.Patient to have decreased pain to 4/10 or better.  5.Patient to increase LE balance to single leg stand up to 15 sec or better.  6.Patient to improve score on the FOTO by 10%.    Long Term Goals: In 10 weeks  1. Patient to perform daily activities including sit to stand without guarding and dead lifting up to 30 # for home management.  2. Patient to demonstrate hip abduction strength to 5/5.  3. Patient to have decreased pain to 3/10 or better in the R knee.  5. Patient to improve score on the FOTO to 44.        Plan   Plan of care Certification: 3/1/2024 to 5/9/24.     Outpatient Physical Therapy 2 times weekly for 10 weeks to include the following interventions: Cervical/Lumbar Traction, Electrical Stimulation IFC, NMES, Gait Training, Manual Therapy, Moist Heat/ Ice, Neuromuscular Re-ed, Patient Education, Self Care, Therapeutic Activities, Therapeutic Exercise, and DN.     Harpreet Amin, PTA

## 2024-04-08 ENCOUNTER — CLINICAL SUPPORT (OUTPATIENT)
Dept: REHABILITATION | Facility: HOSPITAL | Age: 79
End: 2024-04-08
Payer: MEDICARE

## 2024-04-08 DIAGNOSIS — M54.50 CHRONIC BILATERAL LOW BACK PAIN WITHOUT SCIATICA: Primary | ICD-10-CM

## 2024-04-08 DIAGNOSIS — G89.29 CHRONIC BILATERAL LOW BACK PAIN WITHOUT SCIATICA: Primary | ICD-10-CM

## 2024-04-08 PROCEDURE — 97110 THERAPEUTIC EXERCISES: CPT | Mod: PN,CQ

## 2024-04-08 PROCEDURE — 97112 NEUROMUSCULAR REEDUCATION: CPT | Mod: PN,CQ

## 2024-04-08 PROCEDURE — 97530 THERAPEUTIC ACTIVITIES: CPT | Mod: PN,CQ

## 2024-04-08 NOTE — PROGRESS NOTES
"[facility]   Physical Therapy Treatment Note     Name: Teresa Orozco  Clinic Number: [id number]    Therapy Diagnosis:   Encounter Diagnosis   Name Primary?    Chronic bilateral low back pain without sciatica Yes     Physician: Jaiden Joshi MD    Visit Date: 4/8/2024    Physician Orders: PT Eval and Treat   Medical Diagnosis from Referral:   M17.0 (ICD-10-CM) - Primary osteoarthritis of both knees   M19.011,M19.012 (ICD-10-CM) - Primary osteoarthritis of both shoulders      Evaluation Date: 3/1/2024  Authorization Period Expiration: 2/28/24  Plan of Care Expiration: 5/9/24  Visit # / Visits authorized: 10/20  FOTO: 2/3 last performed 3/25/24  Progress note due 30 days from 3/1/24    PTA Visit #: 4/5     Time In: 12:34 pm  Time Out: 1:30 pm  Total Time: 32 minutes billable time    Subjective     Patient reports: doing well today with minimal complaints of pain in right knee. Patient reports getting to interact with friends and family on Easter Sunday without any exacerbations of pain.  She was compliant with home exercise program.  Response to previous treatment: good workout  Functional change: n/a    Pain: 0/10 low back and neck; 1/10 knee    Objective      Objective Measures updated at progress report unless specified.     Treatment     Teresa received the treatments listed below:      therapeutic exercises to develop strength, endurance, ROM, and flexibility for 17 minutes including:    Nustep 6 minutes  Shuttle 5 bands 3 min  Seated LAQ 5# 3 sec hold 2x 10  Seated QL stretch; 3 x15" to left   Standing heel/toe raises; 3 x10  Standing hamstring curls; 2 x10    manual therapy techniques: Joint mobilizations, Manual traction, Myofacial release, and Soft tissue Mobilization were applied to the: right knee for 00 minutes, including:    Right knee PROM  Soft Tissue Mobilization to thigh musculature  Soft Tissue Mobilization around patellar  Gentle patellar mobs    neuromuscular re-education " "activities to improve: Balance, Coordination, Kinesthetic, and Proprioception for 28 minutes. The following activities were included:    PPT with abdominal bracing combo marches;2 x10 Red Theraband   Single knee fall out Blue Theraband; x15 each  PEC bridges 2x 12  SLR; 2 x10  Hip Hikes right off step; 2 x10  Tandem stance; 3 x15" each  Tandem Gait; 2 laps  Retrowalking; 2 laps    therapeutic activities to improve functional performance for 08 minutes, including:    Step ups no riser; BUE A as needed x10 each  Lateral step ups; x10 each side  Sit to stands from 20 inch with 7.5#; 2 x10    Moist heat to medial thigh and ice to medial knee post treatment for 00 minutes    Patient Education and Home Exercises       Education provided:     Written Home Exercises Provided: Patient instructed to cont prior HEP. Exercises were reviewed and Teresa was able to demonstrate them prior to the end of the session.  Teresa demonstrated good  understanding of the education provided. See Electronic Medical Record under Patient Instructions for exercises provided during therapy sessions    Assessment     Continued work attempting to improve core stability, hip/lower extremity strength, as well as functional tolerance to activity. Patient demonstrated limitations of pain in bilateral knees towards the end of sit to stands. Patient was able to complete all other prescribed activities without any other complaints. Patient demonstrates gradually improving tolerance to therapy requiring less frequent rest breaks. Plan to continue strengthening into further function as patient is able to tolerate.     Teresa Is progressing well towards her goals.   Patient prognosis is Good.     Patient will continue to benefit from skilled outpatient physical therapy to address the deficits listed in the problem list box on initial evaluation, provide pt/family education and to maximize pt's level of independence in the home and community environment. "     Patient's spiritual, cultural and educational needs considered and pt agreeable to plan of care and goals.     Anticipated barriers to physical therapy: none    Goals: Short Term Goals: In 4 weeks:  1.Pt to be educated on HEP. met  2.Patient to demo increased AROM to knee flexion to 125 or better. progressing  3.Patient to increase strength hip abduction to 4/5 or better.  4.Patient to have decreased pain to 4/10 or better.  5.Patient to increase LE balance to single leg stand up to 15 sec or better.  6.Patient to improve score on the FOTO by 10%.    Long Term Goals: In 10 weeks  1. Patient to perform daily activities including sit to stand without guarding and dead lifting up to 30 # for home management.  2. Patient to demonstrate hip abduction strength to 5/5.  3. Patient to have decreased pain to 3/10 or better in the R knee.  5. Patient to improve score on the FOTO to 44.        Plan   Plan of care Certification: 3/1/2024 to 5/9/24.     Outpatient Physical Therapy 2 times weekly for 10 weeks to include the following interventions: Cervical/Lumbar Traction, Electrical Stimulation IFC, NMES, Gait Training, Manual Therapy, Moist Heat/ Ice, Neuromuscular Re-ed, Patient Education, Self Care, Therapeutic Activities, Therapeutic Exercise, and DN.     Harpreet Amin, PTA

## 2024-04-11 ENCOUNTER — CLINICAL SUPPORT (OUTPATIENT)
Dept: REHABILITATION | Facility: HOSPITAL | Age: 79
End: 2024-04-11
Payer: MEDICARE

## 2024-04-11 DIAGNOSIS — M54.50 CHRONIC BILATERAL LOW BACK PAIN WITHOUT SCIATICA: Primary | ICD-10-CM

## 2024-04-11 DIAGNOSIS — G89.29 CHRONIC BILATERAL LOW BACK PAIN WITHOUT SCIATICA: Primary | ICD-10-CM

## 2024-04-11 PROCEDURE — 97112 NEUROMUSCULAR REEDUCATION: CPT | Mod: PN

## 2024-04-11 PROCEDURE — 97535 SELF CARE MNGMENT TRAINING: CPT | Mod: PN

## 2024-04-11 PROCEDURE — 97140 MANUAL THERAPY 1/> REGIONS: CPT | Mod: PN

## 2024-04-11 NOTE — PROGRESS NOTES
[facility]   Physical Therapy Treatment Note and Progress Note     Name: Teresa Orozco  Clinic Number: [id number]    Therapy Diagnosis:   Encounter Diagnosis   Name Primary?    Chronic bilateral low back pain without sciatica Yes       Physician: Jiaden Joshi MD    Visit Date: 4/11/2024    Physician Orders: PT Eval and Treat   Medical Diagnosis from Referral:   M17.0 (ICD-10-CM) - Primary osteoarthritis of both knees   M19.011,M19.012 (ICD-10-CM) - Primary osteoarthritis of both shoulders      Evaluation Date: 3/1/2024  Authorization Period Expiration: 2/28/24  Plan of Care Expiration: 5/9/24  Visit # / Visits authorized: 13/20  FOTO: 2/3 last performed 3/25/24  Progress note due 30 days from 4/11/24    PTA Visit #: 0/5     Time In: 1:01 pm  Time Out: 2:25 pm  Total Time: 60 minutes billable time    Subjective     Patient reports: overall moving forward.  She was compliant with home exercise program.  Response to previous treatment: good workout  Functional change: n/a    Pain: 0/10 low back and neck; 1/10 knee    Objective    Balance: R LE single leg stand= 3 sec, L LE single leg stand = 8 sec, hip drop in single leg stand on both sides on eval but today 6 sec and 10 sec;                Sensation: Sensation intact to light touch B LE's      Cervical ROM at eval; today  Flexion 35;60  Extension 40; 50  R side flexion 40; 45  L side flexion  45; 45  R rotation 70; 85  L rotation 80; 85     Shoulder ROM is limited with extension R at 40 and 45 degrees to the L today 65 R and 60 degrees L    Internal/external is full but L internal rotation is at 60 degrees but 70 on L at eval and today         At eval  Today  Knee AROM:                                                  (R)       (L) R L                                      Flexion                         120      124 122 120                                      Extension                    10        0 8 0                                     "                  Hip AROM:                 Flexion                         90        100 120 120                                      ER                               20        20 45 40                                       IR                                 20        20 30 35                                      ABD                             20        20 25 25     Ankle AROM:             Ankle DF(davey)            0          0 2 2                                               At eval  Today  Strength:                                                        R          L R L  Hip flexors L2                          4+/5     4+/5 5 5  Quadriceps L3                        5/5       5/5       5 5                            Hamstrings S1                        4/5       3+/5 4+ 4                          Dorsiflexion L4                        5/5       5/5         5 5                                    Plantar flexion S1                    2/5       2/5 2+ 2+                          Gluteus Medius L45S1           3+/5     4/5 4 4+                          Gluteus Rashid L5S12        3+/5     4/5 4 4+                            Objective Measures updated at progress report unless specified.     Treatment     Teresa received the treatments listed below:      therapeutic exercises to develop strength, endurance, ROM, and flexibility for 13 minutes including:    Nustep 6 minutes  Shuttle 5 bands 3 min  Seated tailgators 3# 3 min    Deferred:  Seated QL stretch; 3 x15" to left   Standing heel/toe raises; 3 x10      manual therapy techniques: Joint mobilizations, Manual traction, Myofacial release, and Soft tissue Mobilization were applied to the: right knee for 15 minutes, including:    Tibial mobs seated, hook lying and soft tissue mobs to the R lateral hamstring, calf and calf    neuromuscular re-education activities to improve: Balance, Coordination, Kinesthetic, and Proprioception for 42 minutes. The following activities " "were included:    SLR 2x 10  PEC bridges 2x 10; add ball squeeze 2x 10  Side lying hip internal rotation with ball squeeze 3x 10 per side  Standing hamstring curls; 2 x10 3# per leg  Seated LAQ 5# 3 sec hold 2x 10    Deferred:  PPT with abdominal bracing combo marches;2 x10 Red Theraband   Single knee fall out Blue Theraband; x15 each  Hip Hikes right off step; 2 x10  Tandem stance; 3 x15" each  Tandem Gait; 2 laps  Retrowalking; 2 laps    ADL/functional activities for 15 min including:     PT reviewed updated HEP with pt based on reassessment and discussed options in nutrition to reduce inflammation to help reduce knee pain and ways to help reduce pain from varicose issues in the legs from increased edema noted today.      therapeutic activities to improve functional performance for 0 minutes, including:    Deferred:  Step ups no riser; BUE A as needed x10 each  Lateral step ups; x10 each side  Sit to stands from 20 inch with 7.5#; 2 x10    Moist heat to medial thigh and ice to medial knee post treatment for 00 minutes    Patient Education and Home Exercises       Education provided:     Written Home Exercises Provided: Patient instructed to cont prior HEP. Exercises were reviewed and eTresa was able to demonstrate them prior to the end of the session.  Teresa demonstrated good  understanding of the education provided. See Electronic Medical Record under Patient Instructions for exercises provided during therapy sessions    Assessment   PT assessed pt mobility and shoulder and neck ROM are much improved allowing for less pain in the back and shoulders and neck for tasks such as sleeping but OA in the R knee resulted in her knee catching for a few minutes in the session after simple R side lying hip abduction. PT used manual therapy tibial mobs in a dependent position to release the knee and pt was able to straighten the knee following. PT updated and reviewed her HEP and other nutritional support things to help reduce " edema and pain as she has other issues such as trigger points and varicose pockets around the knee that may also be contributing to her complaints of pain. PT to advance more mid T and lower trap work as well as tasks to increase hip internal rotation strength to help unload the knee as hip internal rotation is blocking the screw home mechanism on the R knee some resulting in residual pain.     Teresa Is progressing well towards her goals.   Patient prognosis is Good.     Patient will continue to benefit from skilled outpatient physical therapy to address the deficits listed in the problem list box on initial evaluation, provide pt/family education and to maximize pt's level of independence in the home and community environment.     Patient's spiritual, cultural and educational needs considered and pt agreeable to plan of care and goals.     Anticipated barriers to physical therapy: none    Goals: Short Term Goals: In 4 weeks:  1.Pt to be educated on HEP. met  2.Patient to demo increased AROM to knee flexion to 125 or better. progressing  3.Patient to increase strength hip abduction to 4/5 or better. met  4.Patient to have decreased pain to 4/10 or better. met  5.Patient to increase LE balance to single leg stand up to 15 sec or better. progressing  6.Patient to improve score on the FOTO by 10%. met    Long Term Goals: In 10 weeks  1. Patient to perform daily activities including sit to stand without guarding and dead lifting up to 30 # for home management. progressing  2. Patient to demonstrate hip abduction strength to 5/5. progressing  3. Patient to have decreased pain to 3/10 or better in the R knee. progressing  5. Patient to improve score on the FOTO to 44. met        Plan   Plan of care Certification: 3/1/2024 to 5/9/24.     Outpatient Physical Therapy 2 times weekly for 10 weeks to include the following interventions: Cervical/Lumbar Traction, Electrical Stimulation IFC, NMES, Gait Training, Manual Therapy,  Moist Heat/ Ice, Neuromuscular Re-ed, Patient Education, Self Care, Therapeutic Activities, Therapeutic Exercise, and DN.     Daphne Mccollum, PT

## 2024-04-30 ENCOUNTER — TELEPHONE (OUTPATIENT)
Dept: REHABILITATION | Facility: HOSPITAL | Age: 79
End: 2024-04-30
Payer: MEDICARE

## 2024-04-30 NOTE — TELEPHONE ENCOUNTER
Pt wants to continue PT but did not know to add more appt so she is considering it but at this time feels she is doing ok and will see how it goes.

## 2024-05-23 ENCOUNTER — OFFICE VISIT (OUTPATIENT)
Dept: OTOLARYNGOLOGY | Facility: CLINIC | Age: 79
End: 2024-05-23
Payer: MEDICARE

## 2024-05-23 VITALS — WEIGHT: 199.5 LBS | BODY MASS INDEX: 33.24 KG/M2 | TEMPERATURE: 98 F | HEIGHT: 65 IN

## 2024-05-23 DIAGNOSIS — H61.23 BILATERAL IMPACTED CERUMEN: ICD-10-CM

## 2024-05-23 DIAGNOSIS — R09.81 NASAL CONGESTION: Primary | ICD-10-CM

## 2024-05-23 PROCEDURE — 69210 REMOVE IMPACTED EAR WAX UNI: CPT | Mod: S$PBB,,, | Performed by: PHYSICIAN ASSISTANT

## 2024-05-23 PROCEDURE — 69210 REMOVE IMPACTED EAR WAX UNI: CPT | Mod: PBBFAC | Performed by: PHYSICIAN ASSISTANT

## 2024-05-23 PROCEDURE — 99213 OFFICE O/P EST LOW 20 MIN: CPT | Mod: 25,S$PBB,, | Performed by: PHYSICIAN ASSISTANT

## 2024-05-23 PROCEDURE — 99999 PR PBB SHADOW E&M-EST. PATIENT-LVL III: CPT | Mod: PBBFAC,,, | Performed by: PHYSICIAN ASSISTANT

## 2024-05-23 PROCEDURE — 99213 OFFICE O/P EST LOW 20 MIN: CPT | Mod: PBBFAC,25 | Performed by: PHYSICIAN ASSISTANT

## 2024-05-23 NOTE — PROGRESS NOTES
Subjective     Patient ID: Teresa Orozco is a 78 y.o. female.    Chief Complaint: Cerumen Impaction (Ear cleaning)    Patient is a pleasant 78 year old female here to see me today for evaluation of her ears.  She's had issues with cerumen impactions in the past and feels like both ears need to be cleaned again.  No ear pain or drainage.  She denies dizziness.  She also asks about her nasal spray (Flonase).  She has used it in the past for nasal congestion with some benefit but stopped it after having increased sensation of mucus in her throat after using it at night.  She is now having some nasal congestion again when lying down; seems to alternate sides (right is worse).  No sinus pain or purulent nasal drainage.      Review of Systems   Constitutional: Negative.    HENT:  Positive for hearing loss and postnasal drip. Negative for ear discharge and ear pain.    Eyes: Negative.    Cardiovascular: Negative.    Gastrointestinal: Negative.    Endocrine: Negative.    Genitourinary: Negative.    Musculoskeletal:  Positive for back pain and neck pain.   Integumentary:  Negative.   Allergic/Immunologic: Positive for food allergies.   Neurological: Negative.    Hematological: Negative.    Psychiatric/Behavioral: Negative.            Objective     Physical Exam  Constitutional:       Appearance: Normal appearance.   HENT:      Head: Normocephalic and atraumatic.      Right Ear: External ear normal. There is impacted cerumen (removal described below).      Left Ear: External ear normal. There is impacted cerumen.      Nose: Nose normal. No congestion or rhinorrhea.      Right Turbinates: Not enlarged.      Left Turbinates: Not enlarged.      Mouth/Throat:      Mouth: Mucous membranes are moist.      Tongue: No lesions.      Pharynx: Oropharynx is clear. No posterior oropharyngeal erythema.   Eyes:      Pupils: Pupils are equal, round, and reactive to light.   Neck:      Comments: Single suture at right posterior  neck  Pulmonary:      Effort: Pulmonary effort is normal.   Neurological:      General: No focal deficit present.      Mental Status: She is alert.   Psychiatric:         Behavior: Behavior is cooperative.       Procedure Note    CHIEF COMPLAINT:  Cerumen Impaction    Description:  The patient was seated in an exam chair.  An ear speculum was placed in the right EAC and was examined under the microscope.  Suction and/or loop curettes were used to remove a large cerumen impaction.  The tympanic membrane was visualized and was normal in appearance.  The procedure was repeated on the left side in a similar fashion.  The TM was intact and normal on this side as well.  The patient tolerated the procedure well.  She also asked me to remove suture from right posterior neck.   Attempted but knot is tight & scissors are dull.  Recommend she get PCP or Derm to remove soon.              Assessment and Plan     1. Nasal congestion    2. Bilateral impacted cerumen         Recommend she resume use of her Flonase (steroid nasal spray) in the mornings and saline nasal spray at night.  We discussed in detail the proper mechanism of use directing the spray away from the nasal septum.  In addition, we also discussed that it will take two to three weeks of daily use of Flonase to achieve maximal effectiveness.      Cerumen impaction:  Removed today without difficulty.  I would recommend the use of a wax softening drop, either over the counter Debrox or mineral oil, on a weekly basis.  I also instructed the patient to avoid Qtips.           No follow-ups on file.

## 2024-05-28 ENCOUNTER — OFFICE VISIT (OUTPATIENT)
Dept: INTERNAL MEDICINE | Facility: CLINIC | Age: 79
End: 2024-05-28
Payer: MEDICARE

## 2024-05-28 ENCOUNTER — LAB VISIT (OUTPATIENT)
Dept: LAB | Facility: HOSPITAL | Age: 79
End: 2024-05-28
Attending: FAMILY MEDICINE
Payer: MEDICARE

## 2024-05-28 VITALS
HEIGHT: 65 IN | OXYGEN SATURATION: 98 % | BODY MASS INDEX: 33.02 KG/M2 | SYSTOLIC BLOOD PRESSURE: 110 MMHG | HEART RATE: 70 BPM | TEMPERATURE: 98 F | WEIGHT: 198.19 LBS | DIASTOLIC BLOOD PRESSURE: 80 MMHG

## 2024-05-28 DIAGNOSIS — R79.9 ABNORMAL FINDING OF BLOOD CHEMISTRY, UNSPECIFIED: ICD-10-CM

## 2024-05-28 DIAGNOSIS — E78.5 HYPERLIPIDEMIA, UNSPECIFIED HYPERLIPIDEMIA TYPE: Primary | ICD-10-CM

## 2024-05-28 DIAGNOSIS — M17.0 PRIMARY OSTEOARTHRITIS OF BOTH KNEES: ICD-10-CM

## 2024-05-28 DIAGNOSIS — E78.5 HYPERLIPIDEMIA, UNSPECIFIED HYPERLIPIDEMIA TYPE: ICD-10-CM

## 2024-05-28 DIAGNOSIS — Z48.02 VISIT FOR SUTURE REMOVAL: ICD-10-CM

## 2024-05-28 LAB
ALBUMIN SERPL BCP-MCNC: 3.6 G/DL (ref 3.5–5.2)
ALP SERPL-CCNC: 95 U/L (ref 55–135)
ALT SERPL W/O P-5'-P-CCNC: 27 U/L (ref 10–44)
ANION GAP SERPL CALC-SCNC: 9 MMOL/L (ref 8–16)
AST SERPL-CCNC: 25 U/L (ref 10–40)
BILIRUB SERPL-MCNC: 0.5 MG/DL (ref 0.1–1)
BUN SERPL-MCNC: 10 MG/DL (ref 8–23)
CALCIUM SERPL-MCNC: 9.7 MG/DL (ref 8.7–10.5)
CHLORIDE SERPL-SCNC: 104 MMOL/L (ref 95–110)
CHOLEST SERPL-MCNC: 235 MG/DL (ref 120–199)
CHOLEST/HDLC SERPL: 5.6 {RATIO} (ref 2–5)
CO2 SERPL-SCNC: 28 MMOL/L (ref 23–29)
CREAT SERPL-MCNC: 0.8 MG/DL (ref 0.5–1.4)
EST. GFR  (NO RACE VARIABLE): >60 ML/MIN/1.73 M^2
ESTIMATED AVG GLUCOSE: 126 MG/DL (ref 68–131)
GLUCOSE SERPL-MCNC: 102 MG/DL (ref 70–110)
HBA1C MFR BLD: 6 % (ref 4–5.6)
HDLC SERPL-MCNC: 42 MG/DL (ref 40–75)
HDLC SERPL: 17.9 % (ref 20–50)
LDLC SERPL CALC-MCNC: 152 MG/DL (ref 63–159)
NONHDLC SERPL-MCNC: 193 MG/DL
POTASSIUM SERPL-SCNC: 3.8 MMOL/L (ref 3.5–5.1)
PROT SERPL-MCNC: 6.7 G/DL (ref 6–8.4)
SODIUM SERPL-SCNC: 141 MMOL/L (ref 136–145)
TRIGL SERPL-MCNC: 205 MG/DL (ref 30–150)

## 2024-05-28 PROCEDURE — 36415 COLL VENOUS BLD VENIPUNCTURE: CPT | Performed by: FAMILY MEDICINE

## 2024-05-28 PROCEDURE — 80061 LIPID PANEL: CPT | Performed by: FAMILY MEDICINE

## 2024-05-28 PROCEDURE — 83036 HEMOGLOBIN GLYCOSYLATED A1C: CPT | Performed by: FAMILY MEDICINE

## 2024-05-28 PROCEDURE — 99999 PR PBB SHADOW E&M-EST. PATIENT-LVL III: CPT | Mod: PBBFAC,,, | Performed by: FAMILY MEDICINE

## 2024-05-28 PROCEDURE — 80053 COMPREHEN METABOLIC PANEL: CPT | Performed by: FAMILY MEDICINE

## 2024-05-28 PROCEDURE — 99214 OFFICE O/P EST MOD 30 MIN: CPT | Mod: S$PBB,,, | Performed by: FAMILY MEDICINE

## 2024-05-28 PROCEDURE — 99213 OFFICE O/P EST LOW 20 MIN: CPT | Mod: PBBFAC | Performed by: FAMILY MEDICINE

## 2024-05-28 RX ORDER — AMOXICILLIN AND CLAVULANATE POTASSIUM 875; 125 MG/1; MG/1
1 TABLET, FILM COATED ORAL 2 TIMES DAILY
COMMUNITY
Start: 2024-02-21

## 2024-05-28 RX ORDER — SODIUM FLUORIDE 6 MG/ML
PASTE, DENTIFRICE DENTAL
COMMUNITY

## 2024-05-28 NOTE — PROGRESS NOTES
Subjective:       Patient ID: Teresa Orozco is a 78 y.o. female.    Chief Complaint: Follow-up    Has ongoing right knee pain of an intermittent nature.  She has using a cane to ambulate.  This was evaluated by Orthopedic some years ago.  Synvisc injections was recommended but she deferred.  She has had recent physical therapy that helped to a degree.  Additionally she had a lesion neurofibroma removed posterior neck and has a suture still present to be removed.  Surgery was done 2 weeks ago.  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  The margins with possibility of extension.  Dermatology is following this.  She needs lab to include lipid profile CMP and A1c due to pre diabetes hyperlipidemia hypokalemia    Follow-up  Associated symptoms include arthralgias. Pertinent negatives include no abdominal pain, chest pain, chills, coughing or fever.     Review of Systems   Constitutional:  Negative for chills and fever.   Respiratory:  Negative for cough, chest tightness, shortness of breath and wheezing.    Cardiovascular:  Negative for chest pain, palpitations and leg swelling.   Gastrointestinal:  Negative for abdominal distention and abdominal pain.   Musculoskeletal:  Positive for arthralgias.       Objective:      Physical Exam  Constitutional:       General: She is not in acute distress.     Appearance: She is not ill-appearing or diaphoretic.   Cardiovascular:      Rate and Rhythm: Normal rate and regular rhythm.   Pulmonary:      Effort: Pulmonary effort is normal. No respiratory distress.   Lymphadenopathy:      Cervical: No cervical adenopathy.   Skin:     Comments: Posterior neck suture removed with no difficulty   Neurological:      Mental Status: She is alert.         Lab Visit on 02/28/2024   Component Date Value Ref Range Status    Cholesterol 02/28/2024 231 (H)  120 - 199 mg/dL Final    Triglycerides 02/28/2024 212 (H)  30 - 150 mg/dL Final    HDL  02/28/2024 44  40 - 75 mg/dL Final    LDL Cholesterol 02/28/2024 144.6  63.0 - 159.0 mg/dL Final    HDL/Cholesterol Ratio 02/28/2024 19.0 (L)  20.0 - 50.0 % Final    Total Cholesterol/HDL Ratio 02/28/2024 5.3 (H)  2.0 - 5.0 Final    Non-HDL Cholesterol 02/28/2024 187  mg/dL Final    Sed Rate 02/28/2024 35 (H)  0 - 20 mm/Hr Final     Assessment:       1. Hyperlipidemia, unspecified hyperlipidemia type    2. Abnormal finding of blood chemistry, unspecified    3. Primary osteoarthritis of both knees    4. Visit for suture removal        Plan:     Orthopedic follow-up regards her knee.  She is currently having dental work being done.  Lab was ordered.  Follow-up in 3 months.    Hyperlipidemia, unspecified hyperlipidemia type  -     Lipid Panel; Future; Expected date: 05/28/2024  -     Hemoglobin A1C; Future; Expected date: 05/28/2024    Abnormal finding of blood chemistry, unspecified  -     Hemoglobin A1C; Future; Expected date: 05/28/2024  -     Comprehensive Metabolic Panel; Future; Expected date: 05/28/2024    Primary osteoarthritis of both knees    Visit for suture removal

## 2024-06-03 DIAGNOSIS — E87.6 HYPOKALEMIA: ICD-10-CM

## 2024-06-03 RX ORDER — POTASSIUM CHLORIDE 1500 MG/1
20 TABLET, EXTENDED RELEASE ORAL
Qty: 90 TABLET | Refills: 3 | Status: SHIPPED | OUTPATIENT
Start: 2024-06-03

## 2024-08-22 ENCOUNTER — TELEPHONE (OUTPATIENT)
Dept: INTERNAL MEDICINE | Facility: CLINIC | Age: 79
End: 2024-08-22

## 2024-08-22 ENCOUNTER — OFFICE VISIT (OUTPATIENT)
Dept: INTERNAL MEDICINE | Facility: CLINIC | Age: 79
End: 2024-08-22
Payer: MEDICARE

## 2024-08-22 VITALS
SYSTOLIC BLOOD PRESSURE: 110 MMHG | BODY MASS INDEX: 32.87 KG/M2 | HEIGHT: 65 IN | TEMPERATURE: 98 F | WEIGHT: 197.31 LBS | HEART RATE: 72 BPM | OXYGEN SATURATION: 98 % | DIASTOLIC BLOOD PRESSURE: 80 MMHG

## 2024-08-22 DIAGNOSIS — E78.5 HYPERLIPIDEMIA, UNSPECIFIED HYPERLIPIDEMIA TYPE: ICD-10-CM

## 2024-08-22 DIAGNOSIS — R60.9 FLUID RETENTION: ICD-10-CM

## 2024-08-22 DIAGNOSIS — M25.562 PAIN IN BOTH KNEES, UNSPECIFIED CHRONICITY: Primary | ICD-10-CM

## 2024-08-22 DIAGNOSIS — M25.561 PAIN IN BOTH KNEES, UNSPECIFIED CHRONICITY: Primary | ICD-10-CM

## 2024-08-22 DIAGNOSIS — M17.0 PRIMARY OSTEOARTHRITIS OF BOTH KNEES: ICD-10-CM

## 2024-08-22 PROCEDURE — 99214 OFFICE O/P EST MOD 30 MIN: CPT | Mod: PBBFAC | Performed by: FAMILY MEDICINE

## 2024-08-22 PROCEDURE — 99999 PR PBB SHADOW E&M-EST. PATIENT-LVL IV: CPT | Mod: PBBFAC,,, | Performed by: FAMILY MEDICINE

## 2024-08-22 PROCEDURE — 99214 OFFICE O/P EST MOD 30 MIN: CPT | Mod: S$PBB,,, | Performed by: FAMILY MEDICINE

## 2024-08-22 NOTE — TELEPHONE ENCOUNTER
----- Message from Loan Ruvalcaba sent at 8/22/2024  3:09 PM CDT -----  Regarding: Ambulatory referral/consult to Orthopedics  8/22/2024       Hello,      I received a call from MOSHE OROZCO [[id number]]  regarding scheduling an Orthopedics Referral. The providers available requires an X-Ray of the affected area before scheduling. Please put a referral in the system for an X-Ray so she can be scheduled in Orthopedics. Ms. Orozco can be reached at [phone].       Thank you,   Loan MCGEE   Access Navigator

## 2024-08-22 NOTE — TELEPHONE ENCOUNTER
Orthopedic surgery is requesting hand X-ray be completed before scheduling patient. Please advise.

## 2024-08-22 NOTE — TELEPHONE ENCOUNTER
----- Message from Loan Ruvalcaba sent at 8/22/2024  3:09 PM CDT -----  Regarding: Ambulatory referral/consult to Orthopedics  8/22/2024       Hello,      I received a call from MOSHE OROZCO [[id number]]  regarding scheduling an Orthopedics Referral. The providers available requires an X-Ray of the affected area before scheduling. Please put a referral in the system for an X-Ray so she can be scheduled in Orthopedics. Ms. Orozco can be reached at [phone].       Thank you,   Loan MCGEE   Access Navigator

## 2024-08-22 NOTE — PROGRESS NOTES
"Patient ID: Teresa Orozco is a 78 y.o. female.    Chief Complaint: Follow-up    History of Present Illness    Ms. Orozco presents today for follow up.    She reports orthopedic issues affecting multiple areas of her body. Her primary concern is her knees, with the right knee more severely affected, occasionally requiring the use of a cane. She describes an intermittent "catch" in the right knee that prevents weight-bearing when it occurs. She also mentions issues with her neck, both shoulders, and lower back. Her previously problematic wrist is not currently bothering her.    She reports recent swelling in her ankles and lower legs, with the left ankle being the worst affected. She stopped taking hydrochlorothiazide (HCTZ) for two weeks during a trip and noticed improvement in the swelling. She has a history of fluid retention when stopping HCTZ. Home blood pressure monitoring typically shows normal readings around 110/80.    She has been off HCTZ for two weeks. She was previously taking a potassium supplement with HCTZ due to low potassium levels and is uncertain about continuing it. She is not currently taking Lipitor or any other cholesterol-lowering medication.    She reports a history of elevated cholesterol since her teenage years, with levels consistently above 220 mg/dL. Her cholesterol level was 176 mg/dL a year ago but has recently increased to 235 mg/dL. She developed cholesterol stones in her gallbladder after her second child, which was subsequently removed. Previous use of tamoxifen significantly increased her triglyceride levels, which have been decreasing since discontinuation.    She reports making dietary changes to reduce salt intake, including switching to unsalted butter and stopping consumption of soft drinks. She denies using salt while cooking, adding salt to food, or using a salt shaker when using salted butter. She acknowledges the need to improve her diet further to address elevated " cholesterol levels.    ROS:  General: -fever, -chills, -fatigue, -weight gain, -weight loss  Eyes: -vision changes, -redness, -discharge  ENT: -ear pain, -nasal congestion, -sore throat  Cardiovascular: -chest pain, -palpitations, -lower extremity edema  Respiratory: -cough, -shortness of breath  Gastrointestinal: -abdominal pain, -nausea, -vomiting, -diarrhea, -constipation, -blood in stool  Genitourinary: -dysuria, -hematuria, -frequency  Musculoskeletal: +joint pain, -muscle pain  Skin: -rash, -lesion  Neurological: -headache, -dizziness, -numbness, -tingling         Physical Exam    General: In no acute distress. Not ill-appearing or diaphoretic.  Neck: Normal thyroid. No cervical lymphadenopathy. 2+ carotid pulses.  Cardiovascular: Normal rate and regular  rhythm. No murmur heard. No gallop.  Pulmonary: Pulmonary effort is normal. No respiratory distress. No wheezing. No rhonchi. No rales.  Abdominal: Soft. Nontender. Nondistended. No mass.  Musculoskeletal: No swelling. No tenderness. No deformity.  Neurological: Alert.  Psychiatric: Mood normal. Behavior normal.         Assessment & Plan    E78.5 Hyperlipidemia, unspecified  M25.569 Pain in unspecified knee  M25.561 Pain in right knee  M25.562 Pain in left knee  Z79.82 Long term (current) use of aspirin  Z87.11 Personal history of peptic ulcer disease  R26.2 Difficulty in walking, not elsewhere classified  FLUID RETENTION:  - Assessed patient's fluid retention and swelling, noting improvement after discontinuation of HCTZ.  - Explained the body's adjustment process when reducing salt intake.  - Discussed the relationship between sodium intake, fluid retention, and hot weather.  - MsKameron Chaudhrye to eliminate salt intake as much as possible.  - Discontinued daily HCTZ.  - Take HCTZ only when needed for fluid retention.  - Take potassium supplement only when taking HCTZ.  HYPERLIPIDEMIA:  - Evaluated cholesterol levels, which have increased from 176 to 235 over the  past year.  - Considered restarting cholesterol medication but opted for dietary intervention first.  - Recommend improving diet to address elevated cholesterol levels.  HYPERTENSION:  - Unable to auscultate blood pressure, relied on clinical judgment and patient's home readings.  - Ms. Orozco to continue monitoring blood pressure at home.  ORTHOPEDICS REFERRAL:  - Referred to Orthopedics for knee evaluation.  FOLLOW UP:  - Follow up in 3 months for fasting cholesterol recheck and reevaluation.                Follow up in about 3 months (around 11/22/2024).    This note was generated with the assistance of ambient listening technology. Verbal consent was obtained by the patient and accompanying visitor(s) for the recording of patient appointment to facilitate this note. I attest to having reviewed and edited the generated note for accuracy, though some syntax or spelling errors may persist. Please contact the author of this note for any clarification.

## 2024-08-23 ENCOUNTER — TELEPHONE (OUTPATIENT)
Dept: INTERNAL MEDICINE | Facility: CLINIC | Age: 79
End: 2024-08-23
Payer: MEDICARE

## 2024-08-23 ENCOUNTER — HOSPITAL ENCOUNTER (OUTPATIENT)
Dept: RADIOLOGY | Facility: HOSPITAL | Age: 79
Discharge: HOME | End: 2024-08-23
Attending: FAMILY MEDICINE
Payer: MEDICARE

## 2024-08-23 DIAGNOSIS — M25.562 CHRONIC PAIN OF BOTH KNEES: ICD-10-CM

## 2024-08-23 DIAGNOSIS — M25.561 CHRONIC PAIN OF BOTH KNEES: Primary | ICD-10-CM

## 2024-08-23 DIAGNOSIS — G89.29 CHRONIC PAIN OF BOTH KNEES: ICD-10-CM

## 2024-08-23 DIAGNOSIS — G89.29 CHRONIC PAIN OF BOTH KNEES: Primary | ICD-10-CM

## 2024-08-23 DIAGNOSIS — M25.562 CHRONIC PAIN OF BOTH KNEES: Primary | ICD-10-CM

## 2024-08-23 DIAGNOSIS — M25.561 CHRONIC PAIN OF BOTH KNEES: ICD-10-CM

## 2024-08-23 PROCEDURE — 73562 X-RAY EXAM OF KNEE 3: CPT | Mod: TC,50

## 2024-08-23 PROCEDURE — 73562 X-RAY EXAM OF KNEE 3: CPT | Mod: 26,50,, | Performed by: RADIOLOGY

## 2024-08-23 NOTE — TELEPHONE ENCOUNTER
Called patient to inform of orders placed for X-ray, no answer left voice message awaiting return call.

## 2024-10-04 ENCOUNTER — OFFICE VISIT (OUTPATIENT)
Dept: ORTHOPEDICS | Facility: CLINIC | Age: 79
End: 2024-10-04
Payer: MEDICARE

## 2024-10-04 VITALS
BODY MASS INDEX: 31.65 KG/M2 | HEIGHT: 65 IN | WEIGHT: 190 LBS | DIASTOLIC BLOOD PRESSURE: 87 MMHG | SYSTOLIC BLOOD PRESSURE: 145 MMHG | HEART RATE: 76 BPM

## 2024-10-04 DIAGNOSIS — M17.0 BILATERAL PRIMARY OSTEOARTHRITIS OF KNEE: Primary | ICD-10-CM

## 2024-10-04 DIAGNOSIS — M25.561 PAIN IN BOTH KNEES, UNSPECIFIED CHRONICITY: ICD-10-CM

## 2024-10-04 DIAGNOSIS — M25.562 PAIN IN BOTH KNEES, UNSPECIFIED CHRONICITY: ICD-10-CM

## 2024-10-04 PROCEDURE — 99999 PR PBB SHADOW E&M-EST. PATIENT-LVL IV: CPT | Mod: PBBFAC,,, | Performed by: PHYSICIAN ASSISTANT

## 2024-10-04 PROCEDURE — 99214 OFFICE O/P EST MOD 30 MIN: CPT | Mod: PBBFAC | Performed by: PHYSICIAN ASSISTANT

## 2024-10-04 NOTE — PROGRESS NOTES
Patient ID: Teresa Orozco is a 79 y.o. female.    Chief Complaint: Pain of the Right Knee and Pain of the Left Knee      HPI: Teresa Orozco  is a 79 y.o. female who c/o Pain of the Right Knee and Pain of the Left Knee       Patient is a new patient who presents to me today with chief complaint of bilateral knee pain right greater than left.  Patient states pain started roughly in 2007 when she twisted her right leg.  She states since that time she has had pain to the medial aspect.  This was disrupted in 2008 when she was diagnosed with cancer and has since been focused on this which several recurrences.  She was started on tamoxifen which caused increased joint pain throughout her body especially at night.  Ultimately she had to stop this medication due to side effects.  This year she has started seeing the right knee catch more at times where she can not even put weight on it at all.  She has progressed to using a cane for assistance with ambulation.  She is presently in PT and is seeing some results from this.  It has caught several times and PT as well and they have just had to wait it out.  She was taking NSAIDs in the form of Mobic and Voltaren but this has been stopped by her PCP.  She has been doing elevation ice and massage.  She was being seen by by an Wayne prior to his alf.  She was given sleeves and a cane never provided any injections to the knee.  She notes she is starting to have pain now to the lateral side as well with knots forming and catching.    When talking about discussion of treatment the patient gave mentioned that she has been having headaches intermittently with increasing visits changes which she thought might be related to high blood pressure as she has been seeing rising readings in the 190s.  Today blood pressure was 145 systolic.  Ask that she keep a monitor on this and please follow up with her primary care.  Due to this we will hold off on any short-acting steroid  today    Patient is presently denying any shortness of breath, chest pain, fever/chills, nausea/vomiting, loss of taste or smell, numbness/tingling or sensation changes, loss of bladder or bowel function.    Past Medical History:   Diagnosis Date    Anxiety     Arthritis     Breast cancer     Depression     Fluid retention     Headache     Hyperlipidemia     IBS (irritable bowel syndrome)     Inflammatory bowel disease     lymphocitic colitis s/p radiation    Low back pain, non-specific 10/2/2019    Lymphocytic colitis 2008    in remission    Obesity     Pneumonia     around age 42, did not require hospitalization    Vertigo     intermittent       Past Surgical History:   Procedure Laterality Date    ADENOIDECTOMY      APPENDECTOMY      BREAST SURGERY Bilateral 2018    bilateral mastectomy    CHOLECYSTECTOMY      COLON SURGERY      colonoscopy, polyp removed    COLONOSCOPY W/ BIOPSIES AND POLYPECTOMY  2011    EYE SURGERY      cataracts    HYSTERECTOMY      OOPHORECTOMY Left     TONSILLECTOMY      TUBAL LIGATION         Family History   Problem Relation Name Age of Onset    Cancer Mother Tomasa De Dios     Parkinsonism Mother Tomasa De Dios     Cancer Father Frank De Dios         prostate    Heart disease Father Frank De Dios     Arthritis Father Frank De Dios     No Known Problems Sister      Diabetes Brother Sunny De Dios         currently controlled through diet    Cancer Brother Sunny De Dios         prostate    No Known Problems Son Sergey     Thyroid disease Neg Hx      Migraines Neg Hx      Asthma Neg Hx         Social History     Socioeconomic History    Marital status:     Number of children: 2   Tobacco Use    Smoking status: Former     Current packs/day: 0.00     Average packs/day: 1 pack/day for 5.6 years (5.6 ttl pk-yrs)     Types: Cigarettes     Start date: 1968     Quit date: 1973     Years since quittin.1    Smokeless tobacco: Never   Substance and Sexual Activity    Alcohol use: No    Drug  use: Never    Sexual activity: Yes     Partners: Male     Birth control/protection: Post-menopausal, See Surgical Hx     Social Drivers of Health     Financial Resource Strain: Patient Declined (2/24/2024)    Overall Financial Resource Strain (CARDIA)     Difficulty of Paying Living Expenses: Patient declined   Food Insecurity: Patient Declined (2/24/2024)    Hunger Vital Sign     Worried About Running Out of Food in the Last Year: Patient declined     Ran Out of Food in the Last Year: Patient declined   Transportation Needs: Patient Declined (2/24/2024)    PRAPARE - Transportation     Lack of Transportation (Medical): Patient declined     Lack of Transportation (Non-Medical): Patient declined   Physical Activity: Patient Declined (2/24/2024)    Exercise Vital Sign     Days of Exercise per Week: Patient declined     Minutes of Exercise per Session: Patient declined   Stress: Patient Declined (2/24/2024)    [employer] [place] [employer] - Occupational Stress Questionnaire     Feeling of Stress : Patient declined   Housing Stability: Patient Declined (2/24/2024)    Housing Stability Vital Sign     Unable to Pay for Housing in the Last Year: Patient declined     Number of Places Lived in the Last Year: 1     Unstable Housing in the Last Year: Patient declined       Medication List with Changes/Refills   Current Medications    ACETAMINOPHEN (TYLENOL) 325 MG TABLET    Take 325 mg by mouth as needed.     C/SOURCHERRY/CELERY/GRAPE SEED (TART JEFFERSON ORAL)    Take by mouth.    ESOMEPRAZOLE (NEXIUM) 40 MG CAPSULE    Take 40 mg by mouth once daily.    FLAXSEED OIL OIL    by Misc.(Non-Drug; Combo Route) route.    FLUORIDE, SODIUM, (PREVIDENT 5000) 1.1 % PSTE    Place onto teeth.    FLUTICASONE PROPIONATE (FLONASE) 50 MCG/ACTUATION NASAL SPRAY    2 sprays (100 mcg total) by Each Nostril route once daily.    HYDROCHLOROTHIAZIDE (HYDRODIURIL) 25 MG TABLET    TAKE 1 TABLET ONCE DAILY    KLOR-CON M20 20 MEQ TABLET    TAKE  1 TABLET ONCE DAILY    LORAZEPAM (ATIVAN) 0.5 MG TABLET    Take 0.5 mg by mouth every 6 (six) hours as needed.    MULTIVITAMIN CAPSULE    Take 1 capsule by mouth once daily.    PROPRANOLOL (INDERAL LA) 120 MG 24 HR CAPSULE    Take 1 capsule (120 mg total) by mouth once daily.       Review of patient's allergies indicates:   Allergen Reactions    Sutures Swelling    Adhesive tape-silicones Dermatitis and Blisters     Also allergic to anchor sutures    Phipps flavor      Can't eat any berries except strawberries. Strawberries does not cause any problems.    Demerol [meperidine]     Pantoprazole      Face flush/ tight chest    Shellfish containing products          Objective:     Right Lower Extremity    KNEE:  ROM: passive flex/ ext full  Patella midling, moderate to severe crepitus noted   Ligaments stable  Pain on palpation to anterior patella medial and lateral aspect  No defect in the patellar or quadriceps tendon  Calf NT, soft  (-) Maye sign  DF/PF full  Wiggles toes  Sensation intact to light touch   No pitting edema appreciated   NVI  Cap refill < 2 sec    Skin warm to touch, no obvious lesion noted       IMAGING:    XRAY:  FINDINGS: Advanced tricompartment degenerative changes bilaterally with joint space narrowing, sclerosis and marginal osteophytes greatest within the medial compartments, right greater than left.  Trace left knee joint effusion.  No fracture, suspicious bone lesion, erosive change, osteochondral lesion, or loose osteochondral body is identified.  Mild valgus configuration        Impression:   Advanced degenerative change    Kellgren Douglas scale : 4         Assessment:       Encounter Diagnosis   Name Primary?    Pain in both knees, unspecified chronicity           Plan:       Teresa was seen today for pain and pain.    Diagnoses and all orders for this visit:    Pain in both knees, unspecified chronicity  -     Ambulatory referral/consult to Orthopedics        Teresa Orozco is a new patient  who presents to me today with chief complaint of bilateral knee pain, right greater than left. We had a long discussion today regarding degenerative arthritis in the knees. The patient understands that arthritis is chronic and will worsen over time.  The patient also understands that arthritis may cause episodic flare-ups in pain. Management or if arthritis is achieved through a multi-modal approach including weight loss in obese individuals, activity modification, NSAIDs (topical vs oral) where appropriate, periodic intra-articular steroid injections, viscosupplementation, physical therapy, knee bracing, ambulatory aids, as well as geniculate nerve blocks.  We will hold off on NSAIDs as this was recently stopped by her PCP due to concerns of lab.  She may take Tylenol 650 mg up to 3 times a day on an as-needed basis.  She may continue to use the cane and sleeves to assist with ambulation.  Due to her recent blood pressure readings and headaches and vision changes we will hold off on any short-acting steroid today.  I would like to get her approved for Zilretta as I do believe this is in her best interest.  She has been told in the past that she may be a candidate for gel injections.  With the degree of arthritis and cartilage tear in her knee I do not believe this will provide any additional relief.  We discussed briefly surgical intervention and the patient wishes to hold off at this time.  We will exhaust all non operative means.  I would like to see her back in close follow-up once Zilretta has been approved.  She may call with any questions or concerns in the interim.    Dr. Lomax is aware of the patient & current presentation. He agrees with the current plan above.       Patient verbalized understanding of all instructions and agreed with the above plan.    No follow-ups on file.    The patient understands, chooses and consents to this plan and accepts all   the risks which include but are not limited to  the risks mentioned above.     Disclaimer: This note was prepared using a voice recognition system and is likely to have sound alike errors within the text.

## 2024-10-29 DIAGNOSIS — Z00.00 ENCOUNTER FOR MEDICARE ANNUAL WELLNESS EXAM: ICD-10-CM

## 2024-11-01 ENCOUNTER — PATIENT MESSAGE (OUTPATIENT)
Dept: ORTHOPEDICS | Facility: CLINIC | Age: 79
End: 2024-11-01
Payer: MEDICARE

## 2024-11-04 ENCOUNTER — TELEPHONE (OUTPATIENT)
Dept: PAIN MEDICINE | Facility: CLINIC | Age: 79
End: 2024-11-04
Payer: MEDICARE

## 2024-11-04 ENCOUNTER — OFFICE VISIT (OUTPATIENT)
Dept: ORTHOPEDICS | Facility: CLINIC | Age: 79
End: 2024-11-04
Payer: MEDICARE

## 2024-11-04 VITALS — WEIGHT: 190.06 LBS | BODY MASS INDEX: 31.67 KG/M2 | HEIGHT: 65 IN

## 2024-11-04 DIAGNOSIS — M50.022 CERVICAL DISC DISORDER AT C5-C6 LEVEL WITH MYELOPATHY: ICD-10-CM

## 2024-11-04 DIAGNOSIS — M17.0 BILATERAL PRIMARY OSTEOARTHRITIS OF KNEE: ICD-10-CM

## 2024-11-04 DIAGNOSIS — M25.50 POLYARTHRALGIA: Primary | ICD-10-CM

## 2024-11-04 PROCEDURE — 99999 PR PBB SHADOW E&M-EST. PATIENT-LVL IV: CPT | Mod: PBBFAC,,, | Performed by: PHYSICIAN ASSISTANT

## 2024-11-04 PROCEDURE — 99214 OFFICE O/P EST MOD 30 MIN: CPT | Mod: PBBFAC | Performed by: PHYSICIAN ASSISTANT

## 2024-11-04 PROCEDURE — 99213 OFFICE O/P EST LOW 20 MIN: CPT | Mod: S$PBB,,, | Performed by: PHYSICIAN ASSISTANT

## 2024-11-04 NOTE — PROGRESS NOTES
Patient ID: Teresa Orozco is a 79 y.o. female.    Chief Complaint: Pain of the Right Knee      HPI: Teresa Orozco  is a 79 y.o. female who c/o Pain of the Right Knee       Patient is a new patient who presents to me today with chief complaint of bilateral knee pain right greater than left.  Patient states pain started roughly in 2007 when she twisted her right leg.  She states since that time she has had pain to the medial aspect.  This was disrupted in 2008 when she was diagnosed with cancer and has since been focused on this which several recurrences.  She was started on tamoxifen which caused increased joint pain throughout her body especially at night.  Ultimately she had to stop this medication due to side effects.  This year she has started seeing the right knee catch more at times where she can not even put weight on it at all.  She has progressed to using a cane for assistance with ambulation.  She is presently in PT and is seeing some results from this.  It has caught several times and PT as well and they have just had to wait it out.  She was taking NSAIDs in the form of Mobic and Voltaren but this has been stopped by her PCP.  She has been doing elevation ice and massage.  She was being seen by by an Black prior to his senior care.  She was given sleeves and a cane never provided any injections to the knee.  She notes she is starting to have pain now to the lateral side as well with knots forming and catching.    When talking about discussion of treatment the patient gave mentioned that she has been having headaches intermittently with increasing visits changes which she thought might be related to high blood pressure as she has been seeing rising readings in the 190s.  Today blood pressure was 145 systolic.  Ask that she keep a monitor on this and please follow up with her primary care.  Due to this we will hold off on any short-acting steroid today    Patient is presently denying any shortness of  "breath, chest pain, fever/chills, nausea/vomiting, loss of taste or smell, numbness/tingling or sensation changes, loss of bladder or bowel function.    11/4/2024    Patient presents today for further evaluation of right knee pain.  Patient states pain presently as a 4/10 affecting activity of daily living and thus her quality of life.  She presents having been approved for [facility] but wishes to hold off on further discuss her health today.  She states since her last visit she did reach out to her primary care and they were concerns of a possible TIA for which she has seen neurology and is due to undergo an MRI of the brain for further evaluation next month.  The patient became tearful in this discussion.  She states they have been making some changes with her potassium and blood pressure with her PCP and she has a close follow up in December for this as well.  She additionally notes concerns of polyarthralgias states she has seen 2 rheumatologists and feels that she has been "brushed off" and not heard.  She inquires if there is anyone else she can see within the [facility] as her previous [facility] rheumatologist has left.  She is unable to take NSAIDs due to kidney disease and esophageal conditions.  She is taking Tylenol 650 mg on an as-needed basis.    Patient is presently denying any shortness of breath, chest pain, fever/chills, nausea/vomiting, loss of taste or smell, numbness/tingling or sensation changes, loss of bladder or bowel function, loss of taste/smell.     Past Medical History:   Diagnosis Date    Anxiety     Arthritis     Breast cancer 2008    Depression     Fluid retention     Headache     Hyperlipidemia     IBS (irritable bowel syndrome)     Inflammatory bowel disease     lymphocitic colitis s/p radiation    Low back pain, non-specific 10/2/2019    Lymphocytic colitis 2008    in remission    Obesity     Pneumonia     around age 42, did not require hospitalization    Vertigo     intermittent "       Past Surgical History:   Procedure Laterality Date    ADENOIDECTOMY      APPENDECTOMY      BREAST SURGERY Bilateral 2018    bilateral mastectomy    CHOLECYSTECTOMY  2002    COLON SURGERY      colonoscopy, polyp removed    COLONOSCOPY W/ BIOPSIES AND POLYPECTOMY  2011    EYE SURGERY      cataracts    HYSTERECTOMY      OOPHORECTOMY Left     TONSILLECTOMY      TUBAL LIGATION         Family History   Problem Relation Name Age of Onset    Cancer Mother Tomasa De Dios     Parkinsonism Mother Tomasa De Dios     Cancer Father Frank De Dios         prostate    Heart disease Father Frank De Dios     Arthritis Father Frank De Dios     No Known Problems Sister      Diabetes Brother Sunny De Dios         currently controlled through diet    Cancer Brother Sunny De Dios         prostate    No Known Problems Son Sergey     Thyroid disease Neg Hx      Migraines Neg Hx      Asthma Neg Hx         Social History     Socioeconomic History    Marital status:     Number of children: 2   Tobacco Use    Smoking status: Former     Current packs/day: 0.00     Average packs/day: 1 pack/day for 5.6 years (5.6 ttl pk-yrs)     Types: Cigarettes     Start date: 1968     Quit date: 1973     Years since quittin.2    Smokeless tobacco: Never   Substance and Sexual Activity    Alcohol use: No    Drug use: Never    Sexual activity: Yes     Partners: Male     Birth control/protection: Post-menopausal, See Surgical Hx     Social Drivers of Health     Financial Resource Strain: Patient Declined (2024)    Overall Financial Resource Strain (CARDIA)     Difficulty of Paying Living Expenses: Patient declined   Food Insecurity: Patient Declined (2024)    Hunger Vital Sign     Worried About Running Out of Food in the Last Year: Patient declined     Ran Out of Food in the Last Year: Patient declined   Transportation Needs: Patient Declined (2024)    PRAPARE - Transportation     Lack of Transportation (Medical): Patient declined     Lack  of Transportation (Non-Medical): Patient declined   Physical Activity: Patient Declined (2/24/2024)    Exercise Vital Sign     Days of Exercise per Week: Patient declined     Minutes of Exercise per Session: Patient declined   Stress: Patient Declined (2/24/2024)    [employer] [place] [employer] - Occupational Stress Questionnaire     Feeling of Stress : Patient declined   Housing Stability: Patient Declined (2/24/2024)    Housing Stability Vital Sign     Unable to Pay for Housing in the Last Year: Patient declined     Number of Places Lived in the Last Year: 1     Unstable Housing in the Last Year: Patient declined       Medication List with Changes/Refills   Current Medications    ACETAMINOPHEN (TYLENOL) 325 MG TABLET    Take 325 mg by mouth as needed.     C/SOURCHERRY/CELERY/GRAPE SEED (TART JEFFERSON ORAL)    Take by mouth.    ESOMEPRAZOLE (NEXIUM) 40 MG CAPSULE    Take 40 mg by mouth once daily.    FLAXSEED OIL OIL    by Misc.(Non-Drug; Combo Route) route.    FLUORIDE, SODIUM, (PREVIDENT 5000) 1.1 % PSTE    Place onto teeth.    FLUTICASONE PROPIONATE (FLONASE) 50 MCG/ACTUATION NASAL SPRAY    2 sprays (100 mcg total) by Each Nostril route once daily.    HYDROCHLOROTHIAZIDE (HYDRODIURIL) 25 MG TABLET    TAKE 1 TABLET ONCE DAILY    KLOR-CON M20 20 MEQ TABLET    TAKE 1 TABLET ONCE DAILY    LORAZEPAM (ATIVAN) 0.5 MG TABLET    Take 0.5 mg by mouth every 6 (six) hours as needed.    MULTIVITAMIN CAPSULE    Take 1 capsule by mouth once daily.    PROPRANOLOL (INDERAL LA) 120 MG 24 HR CAPSULE    Take 1 capsule (120 mg total) by mouth once daily.       Review of patient's allergies indicates:   Allergen Reactions    Sutures Swelling    Adhesive tape-silicones Dermatitis and Blisters     Also allergic to anchor sutures    Phipps flavor      Can't eat any berries except strawberries. Strawberries does not cause any problems.    Demerol [meperidine]     Pantoprazole      Face flush/ tight chest    Shellfish containing  products          Objective:     Right Lower Extremity    KNEE:  ROM: passive flex/ ext full  Patella midling, moderate to severe crepitus noted   Ligaments stable  Pain on palpation to anterior patella medial and lateral aspect  No defect in the patellar or quadriceps tendon  Calf NT, soft  (-) Maye sign  DF/PF full  Wiggles toes  Sensation intact to light touch   No pitting edema appreciated   NVI  Cap refill < 2 sec    Skin warm to touch, no obvious lesion noted       IMAGING:    XRAY:  FINDINGS: Advanced tricompartment degenerative changes bilaterally with joint space narrowing, sclerosis and marginal osteophytes greatest within the medial compartments, right greater than left.  Trace left knee joint effusion.  No fracture, suspicious bone lesion, erosive change, osteochondral lesion, or loose osteochondral body is identified.  Mild valgus configuration        Impression:   Advanced degenerative change    Kellgren Douglas scale : 4         Assessment:       Encounter Diagnoses   Name Primary?    Bilateral primary osteoarthritis of knee     Cervical disc disorder at C5-C6 level with myelopathy     Polyarthralgia Yes          Plan:       Teresa was seen today for pain.    Diagnoses and all orders for this visit:    Polyarthralgia  -     Ambulatory referral/consult to Rheumatology; Future    Bilateral primary osteoarthritis of knee    Cervical disc disorder at C5-C6 level with myelopathy  -     Ambulatory referral/consult to Back [facility] Spine Clinic; Future    Other orders  -     Cancel: Large Joint Aspiration/Injection: R knee        Teresa Orozco presents for follow up of right knee pain.. We had a long discussion today regarding degenerative arthritis in the knees. The patient understands that arthritis is chronic and will worsen over time.  The patient also understands that arthritis may cause episodic flare-ups in pain. Management or if arthritis is achieved through a multi-modal approach including weight loss in  obese individuals, activity modification, NSAIDs (topical vs oral) where appropriate, periodic intra-articular steroid injections, viscosupplementation, physical therapy, knee bracing, ambulatory aids, as well as geniculate nerve blocks.  She has been approved for Zilretta which is through late of next year.  We will hold off at this time with her recent further evaluation of potential TIA with neurology blood pressure and potassium medication changes with close follow up by PCP.  We reviewed and discussed patient has polyarthralgia.  I have placed a referral for both back and spine regarding cervical disease and Rheumatology for further evaluation.  I appreciate their assistance.  She may reach out to the office when she is ready to move forward with right knee Zilretta.  She may call with any questions or concerns in the interim.    Patient verbalized understanding of all instructions and agreed with the above plan.    No follow-ups on file.    The patient understands, chooses and consents to this plan and accepts all   the risks which include but are not limited to the risks mentioned above.     Disclaimer: This note was prepared using a voice recognition system and is likely to have sound alike errors within the text.

## 2024-11-04 NOTE — PROCEDURES
Large Joint Aspiration/Injection: R knee    Date/Time: 11/4/2024 10:00 AM    Performed by: Desiree Collins PA  Authorized by: Desiree Collins PA    Consent Done?:  Yes (Verbal)  Indications:  Arthritis, joint swelling and pain  Site marked: the procedure site was marked      Local anesthesia used?: Yes    Local anesthetic:  Topical anesthetic    Details:  Needle Size:  21 G  Approach:  Anterolateral  Location:  Knee  Site:  R knee  Medications:  32 mg triamcinolone acetonide 32 mg  Patient tolerance:  Patient tolerated the procedure well with no immediate complications     Verbal consent was obtained  The patient's ID, site, side was verified  The site was sterile prepped in standard fashion  The injection was performed in the jose-lateral side without complication  A sterile Band-Aid was applied    Patient was directed to apply ice today at roughly 15 minutes at a time as needed.  It was discussed that they may be sore for the next few days or so.  Please avoid strenuous activity over the next 24 hours.  It was also discussed that the patient may have a increase in glucose if diabetic and should monitor levels.  Patient was instructed to call as needed.

## 2024-11-05 ENCOUNTER — TELEPHONE (OUTPATIENT)
Dept: PAIN MEDICINE | Facility: CLINIC | Age: 79
End: 2024-11-05
Payer: MEDICARE

## 2024-11-12 ENCOUNTER — OFFICE VISIT (OUTPATIENT)
Dept: INTERNAL MEDICINE | Facility: CLINIC | Age: 79
End: 2024-11-12
Payer: MEDICARE

## 2024-11-12 ENCOUNTER — LAB VISIT (OUTPATIENT)
Dept: LAB | Facility: HOSPITAL | Age: 79
End: 2024-11-12
Attending: FAMILY MEDICINE
Payer: MEDICARE

## 2024-11-12 VITALS
WEIGHT: 192.69 LBS | BODY MASS INDEX: 32.1 KG/M2 | HEART RATE: 77 BPM | DIASTOLIC BLOOD PRESSURE: 84 MMHG | OXYGEN SATURATION: 96 % | RESPIRATION RATE: 18 BRPM | HEIGHT: 65 IN | TEMPERATURE: 99 F | SYSTOLIC BLOOD PRESSURE: 138 MMHG

## 2024-11-12 DIAGNOSIS — I10 PRIMARY HYPERTENSION: Primary | ICD-10-CM

## 2024-11-12 DIAGNOSIS — R60.9 FLUID RETENTION: ICD-10-CM

## 2024-11-12 DIAGNOSIS — E78.5 HYPERLIPIDEMIA, UNSPECIFIED HYPERLIPIDEMIA TYPE: ICD-10-CM

## 2024-11-12 DIAGNOSIS — I10 PRIMARY HYPERTENSION: ICD-10-CM

## 2024-11-12 DIAGNOSIS — R41.9 UNSPECIFIED SYMPTOMS AND SIGNS INVOLVING COGNITIVE FUNCTIONS AND AWARENESS: ICD-10-CM

## 2024-11-12 LAB
ALBUMIN SERPL BCP-MCNC: 3.8 G/DL (ref 3.5–5.2)
ALP SERPL-CCNC: 104 U/L (ref 40–150)
ALT SERPL W/O P-5'-P-CCNC: 21 U/L (ref 10–44)
ANION GAP SERPL CALC-SCNC: 12 MMOL/L (ref 8–16)
AST SERPL-CCNC: 20 U/L (ref 10–40)
BILIRUB SERPL-MCNC: 0.5 MG/DL (ref 0.1–1)
BUN SERPL-MCNC: 14 MG/DL (ref 8–23)
CALCIUM SERPL-MCNC: 10.2 MG/DL (ref 8.7–10.5)
CHLORIDE SERPL-SCNC: 100 MMOL/L (ref 95–110)
CHOLEST SERPL-MCNC: 259 MG/DL (ref 120–199)
CHOLEST/HDLC SERPL: 5.6 {RATIO} (ref 2–5)
CO2 SERPL-SCNC: 30 MMOL/L (ref 23–29)
CREAT SERPL-MCNC: 0.8 MG/DL (ref 0.5–1.4)
EST. GFR  (NO RACE VARIABLE): >60 ML/MIN/1.73 M^2
FOLATE SERPL-MCNC: 17 NG/ML (ref 4–24)
GLUCOSE SERPL-MCNC: 113 MG/DL (ref 70–110)
HDLC SERPL-MCNC: 46 MG/DL (ref 40–75)
HDLC SERPL: 17.8 % (ref 20–50)
LDLC SERPL CALC-MCNC: 172 MG/DL (ref 63–159)
NONHDLC SERPL-MCNC: 213 MG/DL
POTASSIUM SERPL-SCNC: 4.2 MMOL/L (ref 3.5–5.1)
PROT SERPL-MCNC: 7.4 G/DL (ref 6–8.4)
SODIUM SERPL-SCNC: 142 MMOL/L (ref 136–145)
TRIGL SERPL-MCNC: 205 MG/DL (ref 30–150)

## 2024-11-12 PROCEDURE — 82746 ASSAY OF FOLIC ACID SERUM: CPT | Performed by: FAMILY MEDICINE

## 2024-11-12 PROCEDURE — 36415 COLL VENOUS BLD VENIPUNCTURE: CPT | Performed by: FAMILY MEDICINE

## 2024-11-12 PROCEDURE — 80061 LIPID PANEL: CPT | Performed by: FAMILY MEDICINE

## 2024-11-12 PROCEDURE — 80053 COMPREHEN METABOLIC PANEL: CPT | Performed by: FAMILY MEDICINE

## 2024-11-12 PROCEDURE — 99214 OFFICE O/P EST MOD 30 MIN: CPT | Mod: S$PBB,,, | Performed by: FAMILY MEDICINE

## 2024-11-12 PROCEDURE — 99999 PR PBB SHADOW E&M-EST. PATIENT-LVL IV: CPT | Mod: PBBFAC,,, | Performed by: FAMILY MEDICINE

## 2024-11-12 PROCEDURE — 99214 OFFICE O/P EST MOD 30 MIN: CPT | Mod: PBBFAC | Performed by: FAMILY MEDICINE

## 2024-11-12 NOTE — PROGRESS NOTES
Patient ID: Teresa Orozco is a 79 y.o. female.    Chief Complaint: Follow-up    History of Present Illness    Ms. Orozco presents today for follow-up on multiple health concerns.    She reports a recent blood pressure incident that a neurologist suggested might have been a TIA. Planned knee injections were canceled due to elevated blood pressure at the appointment. She has been monitoring her blood pressure at home, noting readings generally lower than those observed during today's visit. She was off hydrochlorothiazide (HCTZ) and potassium for about six weeks prior to this incident but has since resumed both medications. She has a history of low potassium levels resulting in previous hospitalizations. She initially discontinued these medications after noticing reduced ankle swelling during a trip when she had forgotten to take them.    She experienced a severe headache recently, which resolved on its own, following a pattern similar to her typical migraine episodes. Despite resolution, she sought medical attention due to concern. She reports some memory issues but states they are not concerning to her and denies noticing significant differences compared to her , who is close in age.    She reports a history of neck and shoulder pain. An MRI of the cervical spine four years ago showed conflicting interpretations: an [facility] radiologist reported possible spinal cord compression due to a disk, while a neurologist reviewing the same MRI did not concur with this finding.    She expresses concern about potential folic acid deficiency after discussing it with a friend and inquires about testing for this condition. She denies experiencing concerning memory issues and acknowledges having multiple health issues but does not specify any symptoms related to folic acid deficiency.      ROS:  General: -fever, -chills, -fatigue, -weight gain, -weight loss  Eyes: -vision changes, -redness, -discharge  ENT: -ear pain, -nasal  congestion, -sore throat  Cardiovascular: -chest pain, -palpitations, -lower extremity edema  Respiratory: -cough, -shortness of breath  Gastrointestinal: -abdominal pain, -nausea, -vomiting, -diarrhea, -constipation, -blood in stool  Genitourinary: -dysuria, -hematuria, -frequency  Musculoskeletal: +joint pain, -muscle pain, +neck pain  Skin: -rash, -lesion  Neurological: +headache, -dizziness, -numbness, -tingling  Psychiatric: +memory problems         Physical Exam    General: In no acute distress. Not ill-appearing or diaphoretic.  Neck: Normal thyroid. No cervical lymphadenopathy. 2+ carotid pulses.  Cardiovascular: Normal rate and regular  rhythm. No murmur heard. No gallop.  Pulmonary: Pulmonary effort is normal. No respiratory distress. No wheezing. No rhonchi. No rales.  Abdominal: Soft. Nontender. Nondistended. No mass.    Neurological: Alert.  Psychiatric: Mood normal. Behavior normal.  Vitals: Blood pressure: 138/84.         Assessment & Plan    POTENTIAL TIA:  Reviewed patient's concern about potential TIA.  Considered patient's blood pressure readings and recent medication changes.    SPINAL CORD COMPRESSION:  Reviewed patient's concern about spinal cord compression.  Assessed previous MRI results, noting no significant spinal cord stenosis or compression.  Explained that disk contacting spinal cord without significant deformity is not concerning.  Discussed that significant spinal cord compression typically presents with bladder or bowel incontinence.    MEMORY ISSUES:  Evaluated patient's memory issues in context of overall health concerns.    FOLIC ACID DEFICIENCY:  Determined folic acid deficiency test warranted based on patient's concerns.  Folic acid test ordered.    HYPERTENSION:  Ms. Orozco to continue monitoring blood pressure at home.  Ms. Orozco to bring blood pressure monitor to next appointment.  Continued HCTZ.  Continued potassium supplement with HCTZ.          1. Primary hypertension   Comprehensive Metabolic Panel    Lipid Panel    Folate      2. Unspecified symptoms and signs involving cognitive functions and awareness  Folate      3. Fluid retention        4. Hyperlipidemia, unspecified hyperlipidemia type              Follow up in about 1 month (around 12/12/2024).    This note was generated with the assistance of ambient listening technology. Verbal consent was obtained by the patient and accompanying visitor(s) for the recording of patient appointment to facilitate this note. I attest to having reviewed and edited the generated note for accuracy, though some syntax or spelling errors may persist. Please contact the author of this note for any clarification.

## 2024-11-27 RX ORDER — PROPRANOLOL HYDROCHLORIDE 120 MG/1
120 CAPSULE, EXTENDED RELEASE ORAL
Qty: 90 CAPSULE | Refills: 3 | Status: SHIPPED | OUTPATIENT
Start: 2024-11-27

## 2024-11-27 NOTE — TELEPHONE ENCOUNTER
No care due was identified.  Health [facility] Embedded Care Due Messages. Reference number: [id number].   11/27/2024 10:05:36 AM CST

## 2024-11-27 NOTE — TELEPHONE ENCOUNTER
Refill Decision Note   Teresa Orozco  is requesting a refill authorization.  Brief Assessment and Rationale for Refill:        Medication Therapy Plan:            Comments:     Note composed:12:22 PM 11/27/2024

## 2024-12-12 ENCOUNTER — OFFICE VISIT (OUTPATIENT)
Dept: INTERNAL MEDICINE | Facility: CLINIC | Age: 79
End: 2024-12-12
Payer: MEDICARE

## 2024-12-12 VITALS
TEMPERATURE: 97 F | OXYGEN SATURATION: 97 % | DIASTOLIC BLOOD PRESSURE: 84 MMHG | HEART RATE: 81 BPM | BODY MASS INDEX: 31.92 KG/M2 | SYSTOLIC BLOOD PRESSURE: 138 MMHG | WEIGHT: 191.81 LBS | RESPIRATION RATE: 16 BRPM

## 2024-12-12 DIAGNOSIS — E78.5 HYPERLIPIDEMIA, UNSPECIFIED HYPERLIPIDEMIA TYPE: ICD-10-CM

## 2024-12-12 DIAGNOSIS — E66.9 OBESITY (BMI 30-39.9): ICD-10-CM

## 2024-12-12 DIAGNOSIS — R41.9 UNSPECIFIED SYMPTOMS AND SIGNS INVOLVING COGNITIVE FUNCTIONS AND AWARENESS: ICD-10-CM

## 2024-12-12 DIAGNOSIS — I10 PRIMARY HYPERTENSION: Primary | ICD-10-CM

## 2024-12-12 DIAGNOSIS — G43.909 MIGRAINE WITHOUT STATUS MIGRAINOSUS, NOT INTRACTABLE, UNSPECIFIED MIGRAINE TYPE: ICD-10-CM

## 2024-12-12 PROCEDURE — 99999 PR PBB SHADOW E&M-EST. PATIENT-LVL IV: CPT | Mod: PBBFAC,,,

## 2024-12-12 PROCEDURE — 99214 OFFICE O/P EST MOD 30 MIN: CPT | Mod: PBBFAC

## 2024-12-12 NOTE — PROGRESS NOTES
Teresa Orozco  12/12/2024  [id number]    Jaiden Joshi MD  Patient Care Team:  Jaiden Joshi MD as PCP - General (Family Medicine)  Hank Escoto MD as Consulting Physician (Gastroenterology)  Teresa Spicer as Consulting Physician (Surgery)  Kritsal Bucio MD as Consulting Physician (Internal Medicine)  Dorina Henley MD as Consulting Physician (Ophthalmology)  Norberto Moore MD as Consulting Physician (Ophthalmology)          Visit Type:a scheduled routine follow-up visit    Chief Complaint:  Chief Complaint   Patient presents with    Follow-up       History of Present Illness:    History of Present Illness    CHIEF COMPLAINT:  Ms. Orozco presents today for follow-up.    CARDIOVASCULAR:  She reports slightly elevated cholesterol in recent labs and has a history of high cholesterol. She previously used Lipitor but stopped during COVID. She has some Lipitor remaining but is unsure about restarting it, recalling initial difficulty when starting it concurrently with Tamoxifen. In September, she experienced an episode of high blood pressure coinciding with a migraine, which normalized the following day. She continues to monitor her blood pressure at home.    MIGRAINES:  She has a history of migraines since childhood. Previously treated with propranolol in middle age, which significantly reduced frequency. Recently, she's experienced changes in migraine pattern, noting decreased pain but increased visual disturbances. A full migraine episode occurred in July. She identifies triggers including certain bath product scents and her 's presence. She conducted research in her youth about certain esters in perfumes that may contribute to migraine triggers.    CANCER HISTORY:  She previously used Tamoxifen as part of cancer treatment, which she has since completed. She expresses uncertainty about Tamoxifen's effects on her overall health, noting multiple issues while on the  medication.    RECENT MEDICAL EVALUATIONS:  She had a recent appointment with a neurologist and underwent [facility] screening tests, including an evaluation for peripheral artery disease. She had carotid US done as well. Did not show stenosis         ROS:  General: -fever, -chills, -fatigue, -weight gain, -weight loss  Eyes: +vision changes, -redness, -discharge  ENT: -ear pain, -nasal congestion, -sore throat  Cardiovascular: -chest pain, -palpitations, -lower extremity edema  Respiratory: -cough, -shortness of breath  Gastrointestinal: -abdominal pain, -nausea, -vomiting, -diarrhea, -constipation, -blood in stool  Genitourinary: -dysuria, -hematuria, -frequency  Musculoskeletal: -joint pain, -muscle pain  Skin: -rash, -lesion  Neurological: +headache, -dizziness, -numbness, -tingling  Psychiatric: -anxiety, -depression, -sleep difficulty            History:  Past Medical History:   Diagnosis Date    Anxiety     Arthritis     Breast cancer 2008    Depression     Fluid retention     Headache     Hyperlipidemia     IBS (irritable bowel syndrome)     Inflammatory bowel disease     lymphocitic colitis s/p radiation    Low back pain, non-specific 10/2/2019    Lymphocytic colitis 2008    in remission    Obesity     Pneumonia     around age 42, did not require hospitalization    Vertigo     intermittent     Past Surgical History:   Procedure Laterality Date    ADENOIDECTOMY  1951    APPENDECTOMY      BREAST SURGERY Bilateral 03/2018    bilateral mastectomy    CHOLECYSTECTOMY  2002    COLON SURGERY      colonoscopy, polyp removed    COLONOSCOPY W/ BIOPSIES AND POLYPECTOMY  07/2011    EYE SURGERY      cataracts    HYSTERECTOMY      OOPHORECTOMY Left     TONSILLECTOMY      TUBAL LIGATION       Family History   Problem Relation Name Age of Onset    Cancer Mother Tomasa Lanre     Parkinsonism Mother Tomasa De Dios     Cancer Father Frank De Dios         prostate    Heart disease Father Frank De Dios     Arthritis Father Frank De Dios     No  Known Problems Sister      Diabetes Brother Sunny De Dios         currently controlled through diet    Cancer Brother Sunny De Dios         prostate    No Known Problems Son Sergey     Thyroid disease Neg Hx      Migraines Neg Hx      Asthma Neg Hx       Social History     Socioeconomic History    Marital status:     Number of children: 2   Tobacco Use    Smoking status: Former     Current packs/day: 0.00     Average packs/day: 1 pack/day for 5.6 years (5.6 ttl pk-yrs)     Types: Cigarettes     Start date: 1968     Quit date: 1973     Years since quittin.3    Smokeless tobacco: Never   Substance and Sexual Activity    Alcohol use: No    Drug use: Never    Sexual activity: Yes     Partners: Male     Birth control/protection: Post-menopausal, See Surgical Hx     Social Drivers of Health     Financial Resource Strain: Patient Declined (2024)    Overall Financial Resource Strain (CARDIA)     Difficulty of Paying Living Expenses: Patient declined   Food Insecurity: Patient Declined (2024)    Hunger Vital Sign     Worried About Running Out of Food in the Last Year: Patient declined     Ran Out of Food in the Last Year: Patient declined   Transportation Needs: Patient Declined (2024)    PRAPARE - Transportation     Lack of Transportation (Medical): Patient declined     Lack of Transportation (Non-Medical): Patient declined   Physical Activity: Patient Declined (2024)    Exercise Vital Sign     Days of Exercise per Week: Patient declined     Minutes of Exercise per Session: Patient declined   Stress: Patient Declined (2024)    [employer] - Occupational Stress Questionnaire     Feeling of Stress : Patient declined   Housing Stability: Patient Declined (2024)    Housing Stability Vital Sign     Unable to Pay for Housing in the Last Year: Patient declined     Number of Places Lived in the Last Year: 1     Unstable Housing in the Last Year: Patient declined      Patient Active Problem List   Diagnosis    IBS (irritable bowel syndrome)    Vertigo    Hyperlipidemia    Headache    Anxiety    Fluid retention    Bilateral malignant neoplasm of breast in female, estrogen receptor positive    Lymphocytic colitis    Arthritis    Chronic pain of both knees    History of migraine headaches    Edema    Hypokalemia    Migraine without status migrainosus, not intractable    Vitamin D deficiency disease    Low back pain, non-specific    Arthralgia    Fullness of supraclavicular fossa    Hearing loss of right ear    Other spondylosis, cervical region    Cervical disc disorder at C5-C6 level with myelopathy    Chondritis    Closed fracture of multiple ribs of left side with routine healing    Asymptomatic menopausal state     Osteoporosis, post-menopausal    Decreased hearing of both ears    Osteopenia    Hyperglycemia    Gastroesophageal reflux disease    Skin lesion    Impacted cerumen    COVID-19 virus infection    Fever    Primary osteoarthritis of both shoulders    Primary osteoarthritis of both knees    Primary osteoarthritis of right hand    Chronic bilateral low back pain without sciatica    Visit for suture removal    Abnormal finding of blood chemistry, unspecified    Pain in both knees    Unspecified symptoms and signs involving cognitive functions and awareness     Review of patient's allergies indicates:   Allergen Reactions    Sutures Swelling    Adhesive tape-silicones Dermatitis and Blisters     Also allergic to anchor sutures    Phipps flavor      Can't eat any berries except strawberries. Strawberries does not cause any problems.    Demerol [meperidine]     Pantoprazole      Face flush/ tight chest    Shellfish containing products        The following were reviewed at this visit: active problem list, medication list, allergies, family history, social history, and health maintenance.    Medications:  Current Outpatient Medications on File Prior to Visit   Medication Sig  Dispense Refill    acetaminophen (TYLENOL) 325 MG tablet Take 325 mg by mouth as needed.       C/sourcherry/celery/grape seed (TART CHERRY ORAL) Take by mouth.      esomeprazole (NEXIUM) 40 MG capsule Take 40 mg by mouth once daily.      flaxseed oiL Oil by Misc.(Non-Drug; Combo Route) route.      fluoride, sodium, (PREVIDENT 5000) 1.1 % Pste Place onto teeth.      fluticasone propionate (FLONASE) 50 mcg/actuation nasal spray 2 sprays (100 mcg total) by Each Nostril route once daily. 48 g 3    hydroCHLOROthiazide (HYDRODIURIL) 25 MG tablet TAKE 1 TABLET ONCE DAILY 90 tablet 3    KLOR-CON M20 20 mEq tablet TAKE 1 TABLET ONCE DAILY 90 tablet 3    LORazepam (ATIVAN) 0.5 MG tablet Take 0.5 mg by mouth every 6 (six) hours as needed.      multivitamin capsule Take 1 capsule by mouth once daily.      propranoloL (INDERAL LA) 120 MG 24 hr capsule TAKE 1 CAPSULE ONCE DAILY. 90 capsule 3     No current facility-administered medications on file prior to visit.       Medications have been reviewed and reconciled with patient at this visit.  Barriers to medications reviewed with patient.    Adverse reactions to current medications reviewed with patient..    Over the counter medications reviewed and reconciled with patient.    Exam:  Wt Readings from Last 3 Encounters:   12/12/24 87 kg (191 lb 12.8 oz)   11/12/24 87.4 kg (192 lb 10.9 oz)   11/04/24 86.2 kg (190 lb 0.6 oz)     Temp Readings from Last 3 Encounters:   12/12/24 96.6 °F (35.9 °C) (Tympanic)   11/12/24 98.6 °F (37 °C) (Tympanic)   08/22/24 98 °F (36.7 °C) (Temporal)     BP Readings from Last 3 Encounters:   12/12/24 138/84   11/12/24 138/84   10/04/24 (!) 145/87     Pulse Readings from Last 3 Encounters:   12/12/24 81   11/12/24 77   10/04/24 76     Body mass index is 31.92 kg/m².    Physical Exam  Nursing note reviewed.   Constitutional:       Appearance: She is obese.   HENT:      Head: Normocephalic.   Cardiovascular:      Rate and Rhythm: Normal rate and regular  rhythm.      Heart sounds: Normal heart sounds.   Pulmonary:      Effort: Pulmonary effort is normal. No respiratory distress.      Breath sounds: Normal breath sounds.   Neurological:      Mental Status: She is alert and oriented to person, place, and time.   Psychiatric:         Mood and Affect: Mood normal.         Behavior: Behavior normal.         Thought Content: Thought content normal.         Judgment: Judgment normal.           Laboratory Reviewed ({Yes)  Lab Results   Component Value Date    WBC 10.01 08/28/2023    HGB 13.3 08/28/2023    HCT 41.8 08/28/2023     08/28/2023    CHOL 259 (H) 11/12/2024    TRIG 205 (H) 11/12/2024    HDL 46 11/12/2024    ALT 21 11/12/2024    AST 20 11/12/2024     11/12/2024    K 4.2 11/12/2024     11/12/2024    CREATININE 0.8 11/12/2024    BUN 14 11/12/2024    CO2 30 (H) 11/12/2024    TSH 2.085 10/02/2019    GLUF 107 10/17/2022    HGBA1C 6.0 (H) 05/28/2024     Teresa was seen today for follow-up.    Diagnoses and all orders for this visit:    Primary hypertension  At visit, Blood pressure is at goal. Continue current medications      Obesity (BMI 30-39.9)  Encouraged healthy diet and exercise as tolerated to help bring BMI into normal range.      Unspecified symptoms and signs involving cognitive functions and awareness    Migraine without status migrainosus, not intractable, unspecified migraine type    Hyperlipidemia, unspecified hyperlipidemia type           Assessment & Plan    MIGRAINE:  - Considered patient's history of migraines and recent episode of elevated blood pressure during a migraine attack.    Had MRI with her neurologist  Follow up appt in [place]  Neurologist explained episode could have been from a TIA or possibly a migraine     HYPERTENSION:  - Evaluated blood pressure, which improved on recheck during the visit.    HYPERLIPIDEMIA:  - Assessed cholesterol management, noting patient's history with Tamoxifen and Lipitor.  - Will maintain current  management without restarting cholesterol medication at this time, given borderline values and patient preference.      FOLLOW-UP:  - Follow up in 6 months with Dr. Joshi   - Contact the office sooner if needed.            Care Plan/Goals: Reviewed    Goals    None         Follow up: No follow-ups on file.    After visit summary was printed and given to patient upon discharge today.  Patient goals and care plan are included in After Visit Summary.

## 2024-12-13 ENCOUNTER — PATIENT MESSAGE (OUTPATIENT)
Dept: INTERNAL MEDICINE | Facility: CLINIC | Age: 79
End: 2024-12-13
Payer: MEDICARE

## 2025-01-11 DIAGNOSIS — R60.9 EDEMA, UNSPECIFIED TYPE: ICD-10-CM

## 2025-01-11 NOTE — TELEPHONE ENCOUNTER
No care due was identified.  Health [facility] Embedded Care Due Messages. Reference number: [id number].   1/11/2025 10:46:34 AM CST

## 2025-01-12 NOTE — TELEPHONE ENCOUNTER
Refill Routing Note   Medication(s) are not appropriate for processing by [facility] for the following reason(s):        Drug-disease interaction    [facility] action(s):  Defer        Medication Therapy Plan: Drug-Disease: hydroCHLOROthiazide and Hypokalemia      Appointments  past 12m or future 3m with PCP    Date Provider   Last Visit   11/12/2024 Jaiden Joshi MD   Next Visit   6/16/2025 Jaiden Joshi MD   ED visits in past 90 days: 0        Note composed:8:03 PM 01/11/2025

## 2025-01-13 RX ORDER — HYDROCHLOROTHIAZIDE 25 MG/1
25 TABLET ORAL
Qty: 90 TABLET | Refills: 3 | Status: SHIPPED | OUTPATIENT
Start: 2025-01-13 | End: 2025-01-28

## 2025-01-28 DIAGNOSIS — R60.9 EDEMA, UNSPECIFIED TYPE: ICD-10-CM

## 2025-01-28 RX ORDER — HYDROCHLOROTHIAZIDE 25 MG/1
25 TABLET ORAL DAILY
Qty: 90 TABLET | Refills: 3 | Status: SHIPPED | OUTPATIENT
Start: 2025-01-28

## 2025-01-28 NOTE — TELEPHONE ENCOUNTER
No care due was identified.  [facility] Embedded Care Due Messages. Reference number: [id number].   1/28/2025 2:58:00 PM CST

## 2025-01-29 NOTE — TELEPHONE ENCOUNTER
Refill Routing Note   Medication(s) are not appropriate for processing by [facility] for the following reason(s):        Drug-disease interaction    [facility] action(s):  Defer        Medication Therapy Plan: Pharmacy states they did not get the rx sent on 1/24/25; Drug-Disease: hydroCHLOROthiazide and Hypokalemia    Pharmacist review requested: Yes     Appointments  past 12m or future 3m with PCP    Date Provider   Last Visit   11/12/2024 Jaiden Joshi MD   Next Visit   6/16/2025 Jaiden Joshi MD   ED visits in past 90 days: 0        Note composed:6:49 PM 01/28/2025

## 2025-01-29 NOTE — TELEPHONE ENCOUNTER
Refill Decision Note   Teresa Orozco  is requesting a refill authorization.  Brief Assessment and Rationale for Refill:  Approve     Medication Therapy Plan:  Pharmacy states they did not get the rx sent on 1/24/25; Drug-Disease: hydroCHLOROthiazide and Hypokalemia    Medication Reconciliation Completed: No   Comments:     No Care Gaps recommended.     Note composed:8:16 PM 01/28/2025

## 2025-02-03 DIAGNOSIS — R60.9 EDEMA, UNSPECIFIED TYPE: ICD-10-CM

## 2025-02-03 NOTE — TELEPHONE ENCOUNTER
No care due was identified.  [facility] Embedded Care Due Messages. Reference number: [id number].   2/03/2025 3:08:05 PM CST

## 2025-02-04 RX ORDER — HYDROCHLOROTHIAZIDE 25 MG/1
25 TABLET ORAL DAILY
Qty: 90 TABLET | Refills: 3 | Status: SHIPPED | OUTPATIENT
Start: 2025-02-04

## 2025-02-04 NOTE — TELEPHONE ENCOUNTER
Refill Decision Note   Teresa Orozco  is requesting a refill authorization.  Brief Assessment and Rationale for Refill:  Approve     Medication Therapy Plan:  Approved 1/28/25. Requesting to different pharmacy      Comments:     Note composed:2:18 AM 02/04/2025

## 2025-02-17 ENCOUNTER — OFFICE VISIT (OUTPATIENT)
Dept: PAIN MEDICINE | Facility: CLINIC | Age: 80
End: 2025-02-17
Payer: MEDICARE

## 2025-02-17 ENCOUNTER — LAB VISIT (OUTPATIENT)
Dept: LAB | Facility: HOSPITAL | Age: 80
End: 2025-02-17
Attending: PHYSICAL MEDICINE & REHABILITATION
Payer: MEDICARE

## 2025-02-17 VITALS
WEIGHT: 195.75 LBS | RESPIRATION RATE: 17 BRPM | HEART RATE: 69 BPM | SYSTOLIC BLOOD PRESSURE: 141 MMHG | HEIGHT: 65 IN | BODY MASS INDEX: 32.61 KG/M2 | DIASTOLIC BLOOD PRESSURE: 81 MMHG

## 2025-02-17 DIAGNOSIS — M50.022 CERVICAL DISC DISORDER AT C5-C6 LEVEL WITH MYELOPATHY: ICD-10-CM

## 2025-02-17 DIAGNOSIS — M35.9 AUTOIMMUNE DISEASE: ICD-10-CM

## 2025-02-17 DIAGNOSIS — M79.12 MYALGIA OF AUXILIARY MUSCLES, HEAD AND NECK: ICD-10-CM

## 2025-02-17 DIAGNOSIS — M79.2 NEUROPATHIC PAIN: Primary | ICD-10-CM

## 2025-02-17 LAB
CCP AB SER IA-ACNC: <0.5 U/ML
ERYTHROCYTE [SEDIMENTATION RATE] IN BLOOD BY WESTERGREN METHOD: 22 MM/HR (ref 0–20)

## 2025-02-17 PROCEDURE — 86431 RHEUMATOID FACTOR QUANT: CPT | Performed by: PHYSICAL MEDICINE & REHABILITATION

## 2025-02-17 PROCEDURE — 36415 COLL VENOUS BLD VENIPUNCTURE: CPT | Performed by: PHYSICAL MEDICINE & REHABILITATION

## 2025-02-17 PROCEDURE — 86038 ANTINUCLEAR ANTIBODIES: CPT | Performed by: PHYSICAL MEDICINE & REHABILITATION

## 2025-02-17 PROCEDURE — 86200 CCP ANTIBODY: CPT | Performed by: PHYSICAL MEDICINE & REHABILITATION

## 2025-02-17 PROCEDURE — 99214 OFFICE O/P EST MOD 30 MIN: CPT | Mod: PBBFAC | Performed by: PHYSICAL MEDICINE & REHABILITATION

## 2025-02-17 PROCEDURE — 85651 RBC SED RATE NONAUTOMATED: CPT | Performed by: PHYSICAL MEDICINE & REHABILITATION

## 2025-02-17 PROCEDURE — 86140 C-REACTIVE PROTEIN: CPT | Performed by: PHYSICAL MEDICINE & REHABILITATION

## 2025-02-17 RX ORDER — GABAPENTIN 300 MG/1
CAPSULE ORAL
Qty: 90 CAPSULE | Refills: 2 | Status: SHIPPED | OUTPATIENT
Start: 2025-02-17

## 2025-02-17 NOTE — PROGRESS NOTES
New Patient Chronic Pain Note (Initial Visit)    Referring Physician: Desiree Collins PA    PCP: Jaiden Joshi MD    Chief Complaint:   Chief Complaint   Patient presents with    Cervical disc disorder at C5-C6 level with myelopathy    Knee Pain     right    Shoulder Pain    Joint Pain     All over         SUBJECTIVE:    Teresa Orozco is a 79 y.o. female, right-hand dominant, who presents to the clinic for the evaluation of neck pain.  She was referred by Orthopedics for further evaluation and management of this pain.  She has past medical history of migraine headaches, cervical DDD, anxiety, vertigo, hyperlipidemia, breast cancer history, osteoporosis, IBS, GERD, lymphocytic colitis, polyarthritis, multiple other medical comorbidities as listed in her chart.  The pain started several years ago and symptoms have been worsening.The pain is located in the cervical myofascial area and radiates to the bilateral shoulders.  She also locates pain in the bilateral hands and fingers along with her right knee pain and lower back pain.  The pain is described as  sharp, stabbing, aching  and is rated as 0/10. The pain is rated with a score of  0/10 on the BEST day and a score of 6/10 on the WORST day.  Symptoms interfere with daily activity. The pain is exacerbated by activities and she also states that she has hand and finger stiffness and pain when she 1st wakes up.  The pain is mitigated by nothing in particular.    Patient denies night fever/night sweats, urinary incontinence, bowel incontinence, significant weight loss, significant motor weakness, and loss of sensations.    Pain Disability Index Review:         2/17/2025     9:28 AM   Last 3 PDI Scores   Pain Disability Index (PDI) 0       Non-Pharmacologic Treatments:  Physical Therapy/Home Exercise: yes, currently active with daily home exercises  Ice/Heat:yes  TENS: no  Acupuncture: no  Massage: yes  Chiropractic: yes    Other: no      Pain Medications:  -  Opioids:  None recently  - Adjuvant Medications:  Lorazepam, Tylenol  - Anti-Coagulants:  None     report:  Reviewed and consistent with medication use as prescribed.    Pain Procedures:   Denies      Imaging:   MRI cervical spine 09/23/2020:  Alignment: Straightening of the normal cervical lordosis.     Vertebrae: Cervical vertebral body heights appear maintained.  There are minor endplate degenerative changes at C5-C6 and C6-C7.  Additional degenerative change with mild edema at C4-C5.  No evidence of acute fracture.  Marrow signal is otherwise within normal limits.     Discs: Disc desiccation throughout the cervical spine.  Disc height loss is most pronounced at C5-C6 and C6-C7.     Cord: No abnormal cord signal.     Skull base and craniocervical junction: Normal.     Degenerative findings:     C2-C3: Tiny central disc osteophyte complex.  No significant spinal or neural foraminal stenosis.     C3-C4: Broad-based disc osteophyte complex effaces the anterior thecal sac and contacts the ventral cord without significant deformity.  Bilateral right greater than left uncovertebral spurring contributes to moderate right and mild left-sided neural foraminal stenosis.  No significant spinal canal stenosis.     C4-C5: Broad-based disc osteophyte complex contacts the ventral cord with minor deformity.  Prominent right-sided uncovertebral spurring contributes to mild right-sided neural foraminal stenosis.  No significant spinal canal stenosis.     C5-C6: Broad-based disc osteophyte complex with superimposed small central disc protrusion contacts and deforms the ventral cord.  Mild facet arthropathy, ligamentum flavum hypertrophy and uncovertebral spurring contribute to mild spinal canal stenosis and moderate left-sided neural foraminal stenosis.     C6-C7: Broad-based disc osteophyte complex.  Minor effacement of the anterior thecal sac.  No cord contact or mass effect.  Bilateral uncovertebral spurring contributes to  moderate bilateral neural foraminal stenosis.  No significant spinal canal stenosis.     C7-T1: Mild broad-based disc osteophyte complex slightly effaces anterior thecal sac.  Bilateral facet arthropathy and ligamentum flavum hypertrophy contribute to mild bilateral neural foraminal stenosis.  No significant spinal canal stenosis.     Paraspinal muscles & soft tissues: Unremarkable.     Past Medical History:   Diagnosis Date    Anxiety     Arthritis     Breast cancer 2008    Depression     Fluid retention     Headache     Hyperlipidemia     IBS (irritable bowel syndrome)     Inflammatory bowel disease     lymphocitic colitis s/p radiation    Low back pain, non-specific 10/2/2019    Lymphocytic colitis 2008    in remission    Obesity     Pneumonia     around age 42, did not require hospitalization    Vertigo     intermittent     Past Surgical History:   Procedure Laterality Date    ADENOIDECTOMY  1951    APPENDECTOMY      BREAST SURGERY Bilateral 03/2018    bilateral mastectomy    CHOLECYSTECTOMY  2002    COLON SURGERY      colonoscopy, polyp removed    COLONOSCOPY W/ BIOPSIES AND POLYPECTOMY  07/2011    EYE SURGERY      cataracts    HYSTERECTOMY      OOPHORECTOMY Left     TONSILLECTOMY      TUBAL LIGATION       Social History[1]  Family History   Problem Relation Name Age of Onset    Cancer Mother Tomasa De Dios     Parkinsonism Mother Tomasa De Dios     Cancer Father Frank Osullivanz         prostate    Heart disease Father Frank Osullivanz     Arthritis Father Frank De Dios     No Known Problems Sister      Diabetes Brother Sunny De Dios         currently controlled through diet    Cancer Brother Sunny De Dios         prostate    No Known Problems Son Sergey     Thyroid disease Neg Hx      Migraines Neg Hx      Asthma Neg Hx         Review of patient's allergies indicates:   Allergen Reactions    Sutures Swelling    Adhesive tape-silicones Dermatitis and Blisters     Also allergic to anchor sutures    Phipps flavor      Can't eat any berries except  strawberries. Strawberries does not cause any problems.    Demerol [meperidine]     Pantoprazole      Face flush/ tight chest    Shellfish containing products        Current Outpatient Medications   Medication Sig    acetaminophen (TYLENOL) 325 MG tablet Take 325 mg by mouth as needed.     C/sourcherry/celery/grape seed (TART CHERRY ORAL) Take by mouth.    esomeprazole (NEXIUM) 40 MG capsule Take 40 mg by mouth once daily.    flaxseed oiL Oil by Misc.(Non-Drug; Combo Route) route.    fluoride, sodium, (PREVIDENT 5000) 1.1 % Pste Place onto teeth.    fluticasone propionate (FLONASE) 50 mcg/actuation nasal spray 2 sprays (100 mcg total) by Each Nostril route once daily.    hydroCHLOROthiazide (HYDRODIURIL) 25 MG tablet Take 1 tablet (25 mg total) by mouth once daily.    KLOR-CON M20 20 mEq tablet TAKE 1 TABLET ONCE DAILY    LORazepam (ATIVAN) 0.5 MG tablet Take 0.5 mg by mouth every 6 (six) hours as needed.    multivitamin capsule Take 1 capsule by mouth once daily.    propranoloL (INDERAL LA) 120 MG 24 hr capsule TAKE 1 CAPSULE ONCE DAILY.    gabapentin (NEURONTIN) 300 MG capsule Take 1 capsule every night at bedtime for 3 days THEN take 1 capsule by mouth twice daily for 3 days THEN take 1 capsule by mouth three times daily and continue. Stay at the most comfortable dose.     No current facility-administered medications for this visit.       Review of Systems     GENERAL:  No weight loss, malaise or fevers.  HEENT:   No recent changes in vision or hearing  NECK:  Negative for lumps, no difficulty with swallowing.  RESPIRATORY:  Negative for cough, wheezing or shortness of breath, patient denies any recent URI.  CARDIOVASCULAR:  Negative for chest pain, leg swelling or palpitations.  GI:  Negative for abdominal discomfort, blood in stools or black stools or change in bowel habits.  MUSCULOSKELETAL:  See HPI.  SKIN:  Negative for lesions, rash, and itching.  PSYCH:  No mood disorder or recent psychosocial stressors.   "Patients sleep is not disturbed secondary to pain.  HEMATOLOGY/LYMPHOLOGY:  Negative for prolonged bleeding, bruising easily or swollen nodes.  Patient is not currently taking any anti-coagulants  NEURO:   No history of headaches, syncope, paralysis, seizures or tremors.  All other reviewed and negative other than HPI.    OBJECTIVE:    BP (!) 141/81 (BP Location: Right arm, Patient Position: Sitting)   Pulse 69   Resp 17   Ht 5' 5" (1.651 m)   Wt 88.8 kg (195 lb 12.3 oz)   BMI 32.58 kg/m²         Physical Exam    GENERAL: Well appearing, in no acute distress, alert and oriented x3.  PSYCH:  Mood and affect appropriate.  SKIN: Skin color, texture, turgor normal, no rashes or lesions.  HEAD/FACE:  Normocephalic, atraumatic. Cranial nerves grossly intact.  NECK:  Moderate pain to palpation over the cervical paraspinous muscles. Spurling negative to radicular pain.  Mild-to-moderate pain with neck flexion, extension, and lateral flexion.   CV: RRR with palpation of the radial artery.  PULM: No evidence of respiratory difficulty, symmetric chest rise.  GI:  Soft and non-tender.  BACK:  Moderate pain to palpation over the facet joints of the lumbar spine and lumbar paraspinals.  Limited range of motion secondary to pain reproduction.   EXTREMITIES: No deformities, edema, or skin discoloration. Good capillary refill.  MUSCULOSKELETAL: Shoulder, hip, and knee provocative maneuvers are unremarkable.  There is moderate pain with palpation over the sacroiliac joints bilaterally.  FABERs test is equivocal.  FADIRs test is equivocal.   Bilateral upper and lower extremity strength is normal and symmetric.  No atrophy or tone abnormalities are noted.  NEURO: Bilateral upper and lower extremity coordination and muscle stretch reflexes are physiologic and symmetric.  Plantar response are downgoing. No clonus.  No loss of sensation is noted.  GAIT:  Slow, antalgic.      LABS:  Lab Results   Component Value Date    WBC 10.01 " 08/28/2023    HGB 13.3 08/28/2023    HCT 41.8 08/28/2023    MCV 92 08/28/2023     08/28/2023       CMP  Sodium   Date Value Ref Range Status   11/12/2024 142 136 - 145 mmol/L Final     Potassium   Date Value Ref Range Status   11/12/2024 4.2 3.5 - 5.1 mmol/L Final     Chloride   Date Value Ref Range Status   11/12/2024 100 95 - 110 mmol/L Final     CO2   Date Value Ref Range Status   11/12/2024 30 (H) 23 - 29 mmol/L Final     Glucose   Date Value Ref Range Status   11/12/2024 113 (H) 70 - 110 mg/dL Final     BUN   Date Value Ref Range Status   11/12/2024 14 8 - 23 mg/dL Final     Creatinine   Date Value Ref Range Status   11/12/2024 0.8 0.5 - 1.4 mg/dL Final     Calcium   Date Value Ref Range Status   11/12/2024 10.2 8.7 - 10.5 mg/dL Final     Total Protein   Date Value Ref Range Status   11/12/2024 7.4 6.0 - 8.4 g/dL Final     Albumin   Date Value Ref Range Status   11/12/2024 3.8 3.5 - 5.2 g/dL Final     Total Bilirubin   Date Value Ref Range Status   11/12/2024 0.5 0.1 - 1.0 mg/dL Final     Comment:     For infants and newborns, interpretation of results should be based  on gestational age, weight and in agreement with clinical  observations.    Premature Infant recommended reference ranges:  Up to 24 hours.............<8.0 mg/dL  Up to 48 hours............<12.0 mg/dL  3-5 days..................<15.0 mg/dL  6-29 days.................<15.0 mg/dL       Alkaline Phosphatase   Date Value Ref Range Status   11/12/2024 104 40 - 150 U/L Final     AST   Date Value Ref Range Status   11/12/2024 20 10 - 40 U/L Final     ALT   Date Value Ref Range Status   11/12/2024 21 10 - 44 U/L Final     Anion Gap   Date Value Ref Range Status   11/12/2024 12 8 - 16 mmol/L Final     eGFR if    Date Value Ref Range Status   10/18/2021 >60.0 >60 mL/min/1.73 m^2 Final     eGFR if non    Date Value Ref Range Status   10/18/2021 >60.0 >60 mL/min/1.73 m^2 Final     Comment:     Calculation used to obtain  the estimated glomerular filtration  rate (eGFR) is the CKD-EPI equation.          Lab Results   Component Value Date    HGBA1C 6.0 (H) 05/28/2024             ASSESSMENT: 79 y.o. year old female with chronic neck, shoulder, and lower back pain, consistent with     1. Neuropathic pain        2. Cervical disc disorder at C5-C6 level with myelopathy  Ambulatory referral[facility]consult [facility]      3. Autoimmune disease  REYES    RHEUMATOID FACTOR    C-REACTIVE PROTEIN    Sedimentation rate    CYCLIC CITRUL PEPTIDE ANTIBODY, IGG      4. Myalgia of auxiliary muscles, head and neck              PLAN:   - Interventions:  None at this time     - Anticoagulation use:  None    - Medications: I have stressed the importance of physical activity and a home exercise plan to help with pain and improve health., Patient can continue with medications for now since they are providing benefits, using them appropriately, and without side effects., and Start Neurontin 300mg gradually to three times a day to help with radicular pain.           - Therapy:  Provide the patient with home exercises to implement into her regular routine.    - Psychological:  Discussed coping mechanisms to help address chronic pain issues    - Labs:  Reviewed.  We will obtain autoimmune workup.    - Imaging: Reviewed available imaging with patient and answered any questions they had regarding study.    - Consults/Referrals:  None at this time    - Records:  Reviewed/Obtain old records from outside physicians and imaging    - Follow up visit: return to clinic in 10-12 weeks or as needed    - Counseled patient regarding the importance of activity modification and physical therapy    - This condition does not require this patient to take time off of work, and the primary goal of our Pain Management services is to improve the patient's functional capacity.    - Patient Questions: Answered all of the patient's questions regarding diagnosis, therapy, and  treatment        The above plan and management options were discussed at length with patient. Patient is in agreement with the above and verbalized understanding.    I discussed the goals of interventional chronic pain management with the patient on today's visit.  I explained the utility of injections for diagnostic and therapeutic purposes.  We discussed a multimodal approach to pain including treating the patient's given worst pain at any given time.  We will use a systematic approach to addressing pain.  We will also adopt a multimodal approach that includes injections, adjuvant medications, physical therapy, at times psychiatry.  There may be a limited role for opioid use intermittently in the treatment of pain, more particularly for acute pain although no one approach can be used as a sole treatment modality.    I emphasized the importance of regular exercise, core strengthening and stretching, diet and weight loss as a cornerstone of long-term pain management.      Visit today included increased complexity associated with the care of the episodic problem of chronic pain which was addressed and continue to manage the longitudinal care of the patient due to the serious and/or complex managed problem(s) listed above.      Julio Cesar Henriquez MD  Interventional Pain Management  [facility]    Disclaimer:  This note was prepared using voice recognition system and is likely to have sound alike errors that may have been overlooked even after proof reading.  Please call me with any questions         [1]   Social History  Socioeconomic History    Marital status:     Number of children: 2   Tobacco Use    Smoking status: Former     Current packs/day: 0.00     Average packs/day: 1 pack/day for 5.6 years (5.6 ttl pk-yrs)     Types: Cigarettes     Start date: 1968     Quit date: 1973     Years since quittin.5    Smokeless tobacco: Never   Substance and Sexual Activity    Alcohol use: No    Drug  use: Never    Sexual activity: Yes     Partners: Male     Birth control/protection: Post-menopausal, See Surgical Hx     Social Drivers of Health     Financial Resource Strain: Patient Declined (2/24/2024)    Overall Financial Resource Strain (CARDIA)     Difficulty of Paying Living Expenses: Patient declined   Food Insecurity: Patient Declined (2/24/2024)    Hunger Vital Sign     Worried About Running Out of Food in the Last Year: Patient declined     Ran Out of Food in the Last Year: Patient declined   Transportation Needs: Patient Declined (2/24/2024)    PRAPARE - Transportation     Lack of Transportation (Medical): Patient declined     Lack of Transportation (Non-Medical): Patient declined   Physical Activity: Patient Declined (2/24/2024)    Exercise Vital Sign     Days of Exercise per Week: Patient declined     Minutes of Exercise per Session: Patient declined   Stress: Patient Declined (2/24/2024)    [employer] [place] [employer] - Occupational Stress Questionnaire     Feeling of Stress : Patient declined   Housing Stability: Patient Declined (2/24/2024)    Housing Stability Vital Sign     Unable to Pay for Housing in the Last Year: Patient declined     Number of Places Lived in the Last Year: 1     Unstable Housing in the Last Year: Patient declined

## 2025-02-18 LAB
ANA SER QL IF: NORMAL
CRP SERPL-MCNC: 5.3 MG/L (ref 0–8.2)
RHEUMATOID FACT SERPL-ACNC: <13 IU/ML (ref 0–15)

## 2025-02-22 DIAGNOSIS — Z00.00 ENCOUNTER FOR MEDICARE ANNUAL WELLNESS EXAM: ICD-10-CM

## 2025-03-11 ENCOUNTER — PATIENT MESSAGE (OUTPATIENT)
Dept: PAIN MEDICINE | Facility: CLINIC | Age: 80
End: 2025-03-11
Payer: MEDICARE

## 2025-03-12 RX ORDER — GABAPENTIN 300 MG/1
300 CAPSULE ORAL 2 TIMES DAILY
Qty: 180 CAPSULE | Refills: 3 | Status: SHIPPED | OUTPATIENT
Start: 2025-03-12

## 2025-03-13 ENCOUNTER — PATIENT MESSAGE (OUTPATIENT)
Dept: RHEUMATOLOGY | Facility: CLINIC | Age: 80
End: 2025-03-13
Payer: MEDICARE

## 2025-03-24 ENCOUNTER — PATIENT MESSAGE (OUTPATIENT)
Dept: INTERNAL MEDICINE | Facility: CLINIC | Age: 80
End: 2025-03-24

## 2025-03-24 ENCOUNTER — OFFICE VISIT (OUTPATIENT)
Dept: INTERNAL MEDICINE | Facility: CLINIC | Age: 80
End: 2025-03-24
Payer: MEDICARE

## 2025-03-24 VITALS
HEIGHT: 66 IN | OXYGEN SATURATION: 97 % | BODY MASS INDEX: 32.27 KG/M2 | HEART RATE: 76 BPM | WEIGHT: 200.81 LBS | TEMPERATURE: 98 F | SYSTOLIC BLOOD PRESSURE: 134 MMHG | DIASTOLIC BLOOD PRESSURE: 82 MMHG

## 2025-03-24 DIAGNOSIS — R30.0 DYSURIA: Primary | ICD-10-CM

## 2025-03-24 LAB
BACTERIA #/AREA URNS AUTO: ABNORMAL /HPF
BILIRUB UR QL STRIP.AUTO: NEGATIVE
CLARITY UR: ABNORMAL
COLOR UR AUTO: YELLOW
GLUCOSE UR QL STRIP: NEGATIVE
HGB UR QL STRIP: ABNORMAL
HYALINE CASTS UR QL AUTO: 0 /LPF (ref 0–1)
KETONES UR QL STRIP: NEGATIVE
LEUKOCYTE ESTERASE UR QL STRIP: ABNORMAL
MICROSCOPIC COMMENT: ABNORMAL
NITRITE UR QL STRIP: POSITIVE
PH UR STRIP: 7 [PH]
PROT UR QL STRIP: ABNORMAL
RBC #/AREA URNS AUTO: >100 /HPF (ref 0–4)
SP GR UR STRIP: 1.02
SQUAMOUS #/AREA URNS AUTO: 1 /HPF
UROBILINOGEN UR STRIP-ACNC: NEGATIVE EU/DL
WBC #/AREA URNS AUTO: >100 /HPF (ref 0–5)
WBC CLUMPS UR QL AUTO: ABNORMAL

## 2025-03-24 PROCEDURE — G2211 COMPLEX E/M VISIT ADD ON: HCPCS | Mod: S$PBB,,, | Performed by: INTERNAL MEDICINE

## 2025-03-24 PROCEDURE — 99214 OFFICE O/P EST MOD 30 MIN: CPT | Mod: PBBFAC | Performed by: INTERNAL MEDICINE

## 2025-03-24 PROCEDURE — 87086 URINE CULTURE/COLONY COUNT: CPT | Performed by: INTERNAL MEDICINE

## 2025-03-24 PROCEDURE — 99214 OFFICE O/P EST MOD 30 MIN: CPT | Mod: S$PBB,,, | Performed by: INTERNAL MEDICINE

## 2025-03-24 PROCEDURE — 99999 PR PBB SHADOW E&M-EST. PATIENT-LVL IV: CPT | Mod: PBBFAC,,, | Performed by: INTERNAL MEDICINE

## 2025-03-24 PROCEDURE — 81003 URINALYSIS AUTO W/O SCOPE: CPT | Performed by: INTERNAL MEDICINE

## 2025-03-24 RX ORDER — NAPROXEN SODIUM 220 MG/1
81 TABLET, FILM COATED ORAL DAILY
COMMUNITY

## 2025-03-24 NOTE — PROGRESS NOTES
Teresa Orozco  79 y.o. White female  [id number]    Chief Complaint:  Chief Complaint   Patient presents with    Urinary Tract Infection     Pt states of blood in urine and burning when she urinates. Pt states burning on the outer area. Pt symptoms has been going on for over week .       History of Present Illness:  History of Present Illness    CHIEF COMPLAINT:  Ms. Orozco presents today for urinary symptoms with blood in urine.    URINARY SYMPTOMS:  She reports burning with urination and pink-colored blood on toilet paper after urination but denies blood in underwear. She has been taking cranberry supplements, initially one tablet daily then increased to six tablets daily as indicated on the bottle. She ran out of cranberry supplements yesterday.    ASSOCIATED SYMPTOMS:  She reports right-sided flank pain in the kidney region, described as a pulling sensation. The discomfort is located towards the front and side of the body rather than the back. She denies fever or chills.         Review of Systems   Constitutional:  Negative for chills and fever.   Genitourinary:  Positive for dysuria and hematuria.   Musculoskeletal:  Positive for back pain.       History:  Past Medical History:   Diagnosis Date    Anxiety     Arthritis     Breast cancer 2008    Depression     Fluid retention     Headache     Hyperlipidemia     IBS (irritable bowel syndrome)     Inflammatory bowel disease     lymphocitic colitis s/p radiation    Low back pain, non-specific 10/2/2019    Lymphocytic colitis 2008    in remission    Obesity     Pneumonia     around age 42, did not require hospitalization    Vertigo     intermittent       Medications:  Medications Ordered Prior to Encounter[1]    Allergies:  Review of patient's allergies indicates:   Allergen Reactions    Sutures Swelling    Adhesive tape-silicones Dermatitis and Blisters     Also allergic to anchor sutures    Phipps flavor      Can't eat any berries except strawberries. Strawberries does  not cause any problems.    Demerol [meperidine]     Pantoprazole      Face flush/ tight chest    Shellfish containing products        Exam:  Vitals:    03/24/25 1048   BP: 134/82   Pulse: 76   Temp: 98.2 °F (36.8 °C)     Weight: 91.1 kg (200 lb 13.4 oz)   Body mass index is 32.42 kg/m².      Physical Exam    Vitals: Reviewed.  Constitutional: No acute distress. Well-developed. Not ill-appearing.  Eyes: No scleral icterus.  Cardiovascular: Normal rate.  Pulmonary: Pulmonary effort is normal. No respiratory distress. Abdomen: No CVA tenderness.  Neurological: Alert and oriented to person, place, and time.  Psychiatric: Behavior normal.         Assessment:  The encounter diagnosis was Dysuria.    Assessment & Plan    DYSURIA:  - Suspected urinary tract infection based on the patient's report of pink toilet paper after urination.  - Ordered urine test to evaluate suspected urinary tract infection, confirm diagnosis, and guide treatment.  - Ms. Orozco denies fever and chills.  - Noted that the patient has been taking cranberry supplements, initially 1 per day, then increased to 6 per day as recommended on the bottle.  - Instructed the patient to contact the office with urine test results.    HEMATURIA:  - Considered possibility of vaginal vs. uterine source of bleeding.  - Ordered urine test to evaluate the presence of blood in urine.  - Will follow up with results to address the potential hematuria.    KIDNEY DISORDER:  - Ms. Orozco reports right-sided discomfort in lower abdomen/back area, potentially related to kidney involvement.  - Performed physical exam of the kidney area.  - Ordered urine test to assess potential kidney issues.  - Will follow up with results to address potential kidney problems.                         [1]   Current Outpatient Medications on File Prior to Visit   Medication Sig Dispense Refill    acetaminophen (TYLENOL) 325 MG tablet Take 325 mg by mouth as needed.       aspirin 81 MG Chew Take 81  mg by mouth once daily.      C/sourcherry/celery/grape seed (TART CHERRY ORAL) Take by mouth.      esomeprazole (NEXIUM) 40 MG capsule Take 40 mg by mouth once daily.      flaxseed oiL Oil by Misc.(Non-Drug; Combo Route) route.      fluoride, sodium, (PREVIDENT 5000) 1.1 % Pste Place onto teeth.      fluticasone propionate (FLONASE) 50 mcg/actuation nasal spray 2 sprays (100 mcg total) by Each Nostril route once daily. 48 g 3    gabapentin (NEURONTIN) 300 MG capsule Take 1 capsule (300 mg total) by mouth 2 (two) times daily. 180 capsule 3    hydroCHLOROthiazide (HYDRODIURIL) 25 MG tablet Take 1 tablet (25 mg total) by mouth once daily. 90 tablet 3    KLOR-CON M20 20 mEq tablet TAKE 1 TABLET ONCE DAILY 90 tablet 3    LORazepam (ATIVAN) 0.5 MG tablet Take 0.5 mg by mouth every 6 (six) hours as needed.      multivitamin capsule Take 1 capsule by mouth once daily.      propranoloL (INDERAL LA) 120 MG 24 hr capsule TAKE 1 CAPSULE ONCE DAILY. 90 capsule 3     No current facility-administered medications on file prior to visit.

## 2025-03-25 ENCOUNTER — TELEPHONE (OUTPATIENT)
Dept: INTERNAL MEDICINE | Facility: CLINIC | Age: 80
End: 2025-03-25
Payer: MEDICARE

## 2025-03-25 ENCOUNTER — RESULTS FOLLOW-UP (OUTPATIENT)
Dept: INTERNAL MEDICINE | Facility: CLINIC | Age: 80
End: 2025-03-25

## 2025-03-25 RX ORDER — AMOXICILLIN AND CLAVULANATE POTASSIUM 875; 125 MG/1; MG/1
1 TABLET, FILM COATED ORAL 2 TIMES DAILY
Qty: 14 TABLET | Refills: 0 | Status: SHIPPED | OUTPATIENT
Start: 2025-03-25 | End: 2025-03-25 | Stop reason: SDUPTHER

## 2025-03-25 RX ORDER — AMOXICILLIN AND CLAVULANATE POTASSIUM 875; 125 MG/1; MG/1
1 TABLET, FILM COATED ORAL 2 TIMES DAILY
Qty: 14 TABLET | Refills: 0 | Status: SHIPPED | OUTPATIENT
Start: 2025-03-25 | End: 2025-04-02

## 2025-03-27 LAB — BACTERIA UR CULT: ABNORMAL

## 2025-04-08 NOTE — PROGRESS NOTES
Chronic Pain Note   Referring Physician: No ref. provider found    PCP: Jaiden Joshi MD    Chief Complaint:   Chief Complaint   Patient presents with    Knee Pain        SUBJECTIVE:  Interval History (4/15/2025):  Patient Teresa Orozco presents today for follow-up visit.  Patient is here today for followup. Chief complaint is bilateral knee pain. She has had xrays and seen ortho in the past but has not had any injections or procedures. Pain is a 3/10 today.   She also has some chronic neck and trapezius pain but has been doing some at home exercises which seems to have helped some.  She has chronic migraines- followed by neurology- with control with propranolol.  Patient denies any other complaints or concerns at this time.        2/17/2025  Teresa Orozco is a 79 y.o. female, right-hand dominant, who presents to the clinic for the evaluation of neck pain.  She was referred by Orthopedics for further evaluation and management of this pain.  She has past medical history of migraine headaches, cervical DDD, anxiety, vertigo, hyperlipidemia, breast cancer history, osteoporosis, IBS, GERD, lymphocytic colitis, polyarthritis, multiple other medical comorbidities as listed in her chart.  The pain started several years ago and symptoms have been worsening.The pain is located in the cervical myofascial area and radiates to the bilateral shoulders.  She also locates pain in the bilateral hands and fingers along with her right knee pain and lower back pain.  The pain is described as  sharp, stabbing, aching  and is rated as 0/10. The pain is rated with a score of  0/10 on the BEST day and a score of 6/10 on the WORST day.  Symptoms interfere with daily activity. The pain is exacerbated by activities and she also states that she has hand and finger stiffness and pain when she 1st wakes up.  The pain is mitigated by nothing in particular.    Patient denies night fever/night sweats, urinary incontinence, bowel incontinence,  significant weight loss, significant motor weakness, and loss of sensations.    Pain Disability Index Review:         4/15/2025    10:36 AM 2/17/2025     9:28 AM   Last 3 PDI Scores   Pain Disability Index (PDI) 21 0       Non-Pharmacologic Treatments:  Physical Therapy/Home Exercise: yes, currently active with daily home exercises  Ice/Heat:yes  TENS: no  Acupuncture: no  Massage: yes  Chiropractic: yes    Other: no      Pain Medications:  - Opioids:  None recently  - Adjuvant Medications:  Lorazepam, Tylenol  - Anti-Coagulants:  None     report:  Reviewed and consistent with medication use as prescribed.    Pain Procedures:   Denies      Imaging:   MRI cervical spine 09/23/2020:  Alignment: Straightening of the normal cervical lordosis.     Vertebrae: Cervical vertebral body heights appear maintained.  There are minor endplate degenerative changes at C5-C6 and C6-C7.  Additional degenerative change with mild edema at C4-C5.  No evidence of acute fracture.  Marrow signal is otherwise within normal limits.     Discs: Disc desiccation throughout the cervical spine.  Disc height loss is most pronounced at C5-C6 and C6-C7.     Cord: No abnormal cord signal.     Skull base and craniocervical junction: Normal.     Degenerative findings:     C2-C3: Tiny central disc osteophyte complex.  No significant spinal or neural foraminal stenosis.     C3-C4: Broad-based disc osteophyte complex effaces the anterior thecal sac and contacts the ventral cord without significant deformity.  Bilateral right greater than left uncovertebral spurring contributes to moderate right and mild left-sided neural foraminal stenosis.  No significant spinal canal stenosis.     C4-C5: Broad-based disc osteophyte complex contacts the ventral cord with minor deformity.  Prominent right-sided uncovertebral spurring contributes to mild right-sided neural foraminal stenosis.  No significant spinal canal stenosis.     C5-C6: Broad-based disc osteophyte  complex with superimposed small central disc protrusion contacts and deforms the ventral cord.  Mild facet arthropathy, ligamentum flavum hypertrophy and uncovertebral spurring contribute to mild spinal canal stenosis and moderate left-sided neural foraminal stenosis.     C6-C7: Broad-based disc osteophyte complex.  Minor effacement of the anterior thecal sac.  No cord contact or mass effect.  Bilateral uncovertebral spurring contributes to moderate bilateral neural foraminal stenosis.  No significant spinal canal stenosis.     C7-T1: Mild broad-based disc osteophyte complex slightly effaces anterior thecal sac.  Bilateral facet arthropathy and ligamentum flavum hypertrophy contribute to mild bilateral neural foraminal stenosis.  No significant spinal canal stenosis.     Paraspinal muscles & soft tissues: Unremarkable.     Past Medical History:   Diagnosis Date    Anxiety     Arthritis     Breast cancer 2008    Depression     Fluid retention     Headache     Hyperlipidemia     IBS (irritable bowel syndrome)     Inflammatory bowel disease     lymphocitic colitis s/p radiation    Low back pain, non-specific 10/2/2019    Lymphocytic colitis 2008    in remission    Obesity     Pneumonia     around age 42, did not require hospitalization    Vertigo     intermittent     Past Surgical History:   Procedure Laterality Date    ADENOIDECTOMY  1951    APPENDECTOMY      BREAST SURGERY Bilateral 03/2018    bilateral mastectomy    CHOLECYSTECTOMY  2002    COLON SURGERY      colonoscopy, polyp removed    COLONOSCOPY W/ BIOPSIES AND POLYPECTOMY  07/2011    EYE SURGERY      cataracts    HYSTERECTOMY      OOPHORECTOMY Left     TONSILLECTOMY      TUBAL LIGATION       Social History[1]  Family History   Problem Relation Name Age of Onset    Cancer Mother Tomasa De Dios     Parkinsonism Mother Tomasa De Dios     Cancer Father Frank De Dios         prostate    Heart disease Father Frank De Dios     Arthritis Father Frank De Dios     No Known Problems  Sister      Diabetes Brother Sunny De Dios         currently controlled through diet    Cancer Brother Sunny De Dios         prostate    No Known Problems Son Sergey     Thyroid disease Neg Hx      Migraines Neg Hx      Asthma Neg Hx         Review of patient's allergies indicates:   Allergen Reactions    Sutures Swelling    Adhesive tape-silicones Dermatitis and Blisters     Also allergic to anchor sutures    Phipps flavor      Can't eat any berries except strawberries. Strawberries does not cause any problems.    Demerol [meperidine]     Pantoprazole      Face flush/ tight chest    Shellfish containing products        Current Outpatient Medications   Medication Sig    acetaminophen (TYLENOL) 325 MG tablet Take 325 mg by mouth as needed.     aspirin 81 MG Chew Take 81 mg by mouth once daily.    C/sourcherry/celery/grape seed (TART CHERRY ORAL) Take by mouth.    esomeprazole (NEXIUM) 40 MG capsule Take 40 mg by mouth once daily.    flaxseed oiL Oil by Misc.(Non-Drug; Combo Route) route.    fluoride, sodium, (PREVIDENT 5000) 1.1 % Pste Place onto teeth.    fluticasone propionate (FLONASE) 50 mcg/actuation nasal spray 2 sprays (100 mcg total) by Each Nostril route once daily.    gabapentin (NEURONTIN) 300 MG capsule Take 1 capsule (300 mg total) by mouth 2 (two) times daily.    hydroCHLOROthiazide (HYDRODIURIL) 25 MG tablet Take 1 tablet (25 mg total) by mouth once daily.    KLOR-CON M20 20 mEq tablet TAKE 1 TABLET ONCE DAILY    LORazepam (ATIVAN) 0.5 MG tablet Take 0.5 mg by mouth every 6 (six) hours as needed.    multivitamin capsule Take 1 capsule by mouth once daily.    propranoloL (INDERAL LA) 120 MG 24 hr capsule TAKE 1 CAPSULE ONCE DAILY.     No current facility-administered medications for this visit.       Review of Systems     GENERAL:  No weight loss, malaise or fevers.  HEENT:   No recent changes in vision or hearing  NECK:  Negative for lumps, no difficulty with swallowing.  RESPIRATORY:  Negative for cough, wheezing  "or shortness of breath, patient denies any recent URI.  CARDIOVASCULAR:  Negative for chest pain, leg swelling or palpitations.  GI:  Negative for abdominal discomfort, blood in stools or black stools or change in bowel habits.  MUSCULOSKELETAL:  See HPI.  SKIN:  Negative for lesions, rash, and itching.  PSYCH:  No mood disorder or recent psychosocial stressors.  Patients sleep is not disturbed secondary to pain.  HEMATOLOGY/LYMPHOLOGY:  Negative for prolonged bleeding, bruising easily or swollen nodes.  Patient is not currently taking any anti-coagulants  NEURO:   No history of headaches, syncope, paralysis, seizures or tremors.  All other reviewed and negative other than HPI.    OBJECTIVE:    /77   Pulse 82   Ht 5' 6" (1.676 m)   Wt 90.9 kg (200 lb 6.4 oz)   BMI 32.35 kg/m²         Physical Exam    GENERAL: Well appearing, in no acute distress, alert and oriented x3.  PSYCH:  Mood and affect appropriate.  SKIN: Skin color, texture, turgor normal, no rashes or lesions.  HEAD/FACE:  Normocephalic, atraumatic. Cranial nerves grossly intact.  NECK:  Moderate pain to palpation over the cervical paraspinous muscles. Spurling negative to radicular pain.  Mild-to-moderate pain with neck flexion, extension, and lateral flexion.   CV: RRR with palpation of the radial artery.  PULM: No evidence of respiratory difficulty, symmetric chest rise.  GI:  Soft and non-tender.  BACK:  Moderate pain to palpation over the facet joints of the lumbar spine and lumbar paraspinals.  Limited range of motion secondary to pain reproduction.   EXTREMITIES: No deformities, edema, or skin discoloration. Good capillary refill.  MUSCULOSKELETAL: Shoulder and hip provocative maneuvers are unremarkable.  There is moderate pain with palpation over the sacroiliac joints bilaterally.  FABERs test is equivocal.  FADIRs test is equivocal.   Bilateral upper and lower extremity strength is normal and symmetric.  No atrophy or tone abnormalities " are noted. Bilateral knees with some pain with full extension and flexion.   NEURO: Bilateral upper and lower extremity coordination and muscle stretch reflexes are physiologic and symmetric.  Plantar response are downgoing. No clonus.  No loss of sensation is noted.  GAIT:  Slow, antalgic.      LABS:  Lab Results   Component Value Date    WBC 10.01 08/28/2023    HGB 13.3 08/28/2023    HCT 41.8 08/28/2023    MCV 92 08/28/2023     08/28/2023       CMP  Sodium   Date Value Ref Range Status   11/12/2024 142 136 - 145 mmol/L Final     Potassium   Date Value Ref Range Status   11/12/2024 4.2 3.5 - 5.1 mmol/L Final     Chloride   Date Value Ref Range Status   11/12/2024 100 95 - 110 mmol/L Final     CO2   Date Value Ref Range Status   11/12/2024 30 (H) 23 - 29 mmol/L Final     Glucose   Date Value Ref Range Status   11/12/2024 113 (H) 70 - 110 mg/dL Final     BUN   Date Value Ref Range Status   11/12/2024 14 8 - 23 mg/dL Final     Creatinine   Date Value Ref Range Status   11/12/2024 0.8 0.5 - 1.4 mg/dL Final     Calcium   Date Value Ref Range Status   11/12/2024 10.2 8.7 - 10.5 mg/dL Final     Total Protein   Date Value Ref Range Status   11/12/2024 7.4 6.0 - 8.4 g/dL Final     Albumin   Date Value Ref Range Status   11/12/2024 3.8 3.5 - 5.2 g/dL Final     Total Bilirubin   Date Value Ref Range Status   11/12/2024 0.5 0.1 - 1.0 mg/dL Final     Comment:     For infants and newborns, interpretation of results should be based  on gestational age, weight and in agreement with clinical  observations.    Premature Infant recommended reference ranges:  Up to 24 hours.............<8.0 mg/dL  Up to 48 hours............<12.0 mg/dL  3-5 days..................<15.0 mg/dL  6-29 days.................<15.0 mg/dL       Alkaline Phosphatase   Date Value Ref Range Status   11/12/2024 104 40 - 150 U/L Final     AST   Date Value Ref Range Status   11/12/2024 20 10 - 40 U/L Final     ALT   Date Value Ref Range Status   11/12/2024 21  10 - 44 U/L Final     Anion Gap   Date Value Ref Range Status   11/12/2024 12 8 - 16 mmol/L Final     eGFR if    Date Value Ref Range Status   10/18/2021 >60.0 >60 mL/min/1.73 m^2 Final     eGFR if non    Date Value Ref Range Status   10/18/2021 >60.0 >60 mL/min/1.73 m^2 Final     Comment:     Calculation used to obtain the estimated glomerular filtration  rate (eGFR) is the CKD-EPI equation.          Lab Results   Component Value Date    HGBA1C 6.0 (H) 05/28/2024             ASSESSMENT: 79 y.o. year old female with chronic neck, shoulder, and lower back pain, consistent with     1. Bilateral primary osteoarthritis of knee  Case Request-RAD/Other Procedure Area: Bilateral Knee joint injection      2. Neuropathic pain                PLAN:   - Interventions:  will schedule bilateral IA knee injections  Explained the risks and benefits of the procedure in detail with the patient today in clinic along with alternative treatment options, and the patient elected to pursue the intervention.      If fails to get relief consider viscosupplementation    - Anticoagulation use:  None    - Medications: I have stressed the importance of physical activity and a home exercise plan to help with pain and improve health., Patient can continue with medications for now since they are providing benefits, using them appropriately, and without side effects., and Start Neurontin 300mg gradually to three times a day to help with radicular pain.           - Therapy:  Provide the patient with home exercises to implement into her regular routine.    - Psychological:  Discussed coping mechanisms to help address chronic pain issues    - Labs:   Labs reviewed- minimally elevated sed rate, improved from previous testing    - Imaging: Reviewed available imaging with patient and answered any questions they had regarding study.  Xray bilateral knees reviewed    - Consults/Referrals:  None at this time    - Records:   Reviewed/Obtain old records from outside physicians and imaging    - Follow up visit: return to clinic 4 weeks after injection    - Counseled patient regarding the importance of activity modification and physical therapy    - This condition does not require this patient to take time off of work, and the primary goal of our Pain Management services is to improve the patient's functional capacity.    - Patient Questions: Answered all of the patient's questions regarding diagnosis, therapy, and treatment        The above plan and management options were discussed at length with patient. Patient is in agreement with the above and verbalized understanding.    I discussed the goals of interventional chronic pain management with the patient on today's visit.  I explained the utility of injections for diagnostic and therapeutic purposes.  We discussed a multimodal approach to pain including treating the patient's given worst pain at any given time.  We will use a systematic approach to addressing pain.  We will also adopt a multimodal approach that includes injections, adjuvant medications, physical therapy, at times psychiatry.  There may be a limited role for opioid use intermittently in the treatment of pain, more particularly for acute pain although no one approach can be used as a sole treatment modality.    I emphasized the importance of regular exercise, core strengthening and stretching, diet and weight loss as a cornerstone of long-term pain management.      Visit today included increased complexity associated with the care of the episodic problem of chronic pain which was addressed and continue to manage the longitudinal care of the patient due to the serious and/or complex managed problem(s) listed above.      Kirit Echavarria NP  Interventional Pain Management  [facility]    Disclaimer:  This note was prepared using voice recognition system and is likely to have sound alike errors that may have been  overlooked even after proof reading.  Please call me with any questions           [1]   Social History  Socioeconomic History    Marital status:     Number of children: 2   Tobacco Use    Smoking status: Former     Current packs/day: 0.00     Average packs/day: 1 pack/day for 5.6 years (5.6 ttl pk-yrs)     Types: Cigarettes     Start date: 1968     Quit date: 1973     Years since quittin.6    Smokeless tobacco: Never   Substance and Sexual Activity    Alcohol use: No    Drug use: Never    Sexual activity: Yes     Partners: Male     Birth control/protection: Post-menopausal, See Surgical Hx     Social Drivers of Health     Financial Resource Strain: Low Risk  (3/23/2025)    Overall Financial Resource Strain (CARDIA)     Difficulty of Paying Living Expenses: Not hard at all   Food Insecurity: No Food Insecurity (3/23/2025)    Hunger Vital Sign     Worried About Running Out of Food in the Last Year: Never true     Ran Out of Food in the Last Year: Never true   Transportation Needs: No Transportation Needs (3/23/2025)    PRAPARE - Transportation     Lack of Transportation (Medical): No     Lack of Transportation (Non-Medical): No   Physical Activity: Inactive (3/23/2025)    Exercise Vital Sign     Days of Exercise per Week: 0 days     Minutes of Exercise per Session: 0 min   Stress: No Stress Concern Present (3/23/2025)    [employer] - Occupational Stress Questionnaire     Feeling of Stress : Not at all   Housing Stability: Low Risk  (3/23/2025)    Housing Stability Vital Sign     Unable to Pay for Housing in the Last Year: No     Number of Times Moved in the Last Year: 0     Homeless in the Last Year: No

## 2025-04-15 ENCOUNTER — OFFICE VISIT (OUTPATIENT)
Dept: PAIN MEDICINE | Facility: CLINIC | Age: 80
End: 2025-04-15
Payer: MEDICARE

## 2025-04-15 VITALS
DIASTOLIC BLOOD PRESSURE: 77 MMHG | HEART RATE: 82 BPM | SYSTOLIC BLOOD PRESSURE: 137 MMHG | HEIGHT: 66 IN | WEIGHT: 200.38 LBS | BODY MASS INDEX: 32.2 KG/M2

## 2025-04-15 DIAGNOSIS — M79.2 NEUROPATHIC PAIN: ICD-10-CM

## 2025-04-15 DIAGNOSIS — M17.0 BILATERAL PRIMARY OSTEOARTHRITIS OF KNEE: Primary | ICD-10-CM

## 2025-04-15 PROCEDURE — 99999 PR PBB SHADOW E&M-EST. PATIENT-LVL III: CPT | Mod: PBBFAC,,, | Performed by: NURSE PRACTITIONER

## 2025-04-15 PROCEDURE — 99213 OFFICE O/P EST LOW 20 MIN: CPT | Mod: PBBFAC | Performed by: NURSE PRACTITIONER

## 2025-04-17 ENCOUNTER — PATIENT MESSAGE (OUTPATIENT)
Dept: OTOLARYNGOLOGY | Facility: CLINIC | Age: 80
End: 2025-04-17
Payer: MEDICARE

## 2025-04-17 ENCOUNTER — PATIENT MESSAGE (OUTPATIENT)
Dept: PAIN MEDICINE | Facility: CLINIC | Age: 80
End: 2025-04-17
Payer: MEDICARE

## 2025-04-17 ENCOUNTER — OFFICE VISIT (OUTPATIENT)
Dept: OTOLARYNGOLOGY | Facility: CLINIC | Age: 80
End: 2025-04-17
Payer: MEDICARE

## 2025-04-17 VITALS — HEIGHT: 66 IN | BODY MASS INDEX: 32.35 KG/M2

## 2025-04-17 DIAGNOSIS — H61.23 BILATERAL IMPACTED CERUMEN: Primary | ICD-10-CM

## 2025-04-17 DIAGNOSIS — K12.1 SOFT PALATE ULCERATION: ICD-10-CM

## 2025-04-17 PROCEDURE — 69210 REMOVE IMPACTED EAR WAX UNI: CPT | Mod: PBBFAC

## 2025-04-17 PROCEDURE — 99999 PR PBB SHADOW E&M-EST. PATIENT-LVL III: CPT | Mod: PBBFAC,,,

## 2025-04-17 PROCEDURE — 99213 OFFICE O/P EST LOW 20 MIN: CPT | Mod: PBBFAC

## 2025-04-17 NOTE — PROGRESS NOTES
Subjective:   Patient ID: Teresa Orozco is a 79 y.o. female.    Chief Complaint: Cerumen Impaction (Ear cleaning)    Patient is a pleasant 79 year old female presenting for possible cerumen impaction. She notes hearing loss bilaterally, but denies pain or drainage. She comes once a year for ear cleaning. She is not using any drops to soften the wax.     Also c/o pain at the roof of her mouth. Symptoms come and go. She has asked her dentist about it but it was not active at the time she asked. States it is an uncomfortable feeling. Denies any trauma to the area that she is aware of.       Review of patient's allergies indicates:   Allergen Reactions    Sutures Swelling    Adhesive tape-silicones Dermatitis and Blisters     Also allergic to anchor sutures    Phipps flavor      Can't eat any berries except strawberries. Strawberries does not cause any problems.    Demerol [meperidine]     Pantoprazole      Face flush/ tight chest    Shellfish containing products            Review of Systems   Constitutional: Negative.  Negative for chills, fatigue, fever and unexpected weight change.   HENT:  Positive for hearing loss and postnasal drip. Negative for congestion, dental problem, ear discharge, ear pain, facial swelling, nosebleeds, rhinorrhea, sinus pressure, sneezing, sore throat, tinnitus, trouble swallowing and voice change.    Eyes: Negative.  Negative for redness, itching and visual disturbance.   Respiratory: Negative.  Negative for cough, choking, shortness of breath and wheezing.    Cardiovascular: Negative.  Negative for chest pain and palpitations.   Gastrointestinal: Negative.  Negative for abdominal pain.        No reflux.   Endocrine: Negative.    Genitourinary: Negative.    Musculoskeletal:  Positive for back pain and neck pain. Negative for gait problem.   Skin: Negative.  Negative for rash.   Neurological:  Positive for dizziness, light-headedness and headaches.   Hematological: Negative.   "  Psychiatric/Behavioral: Negative.           Objective:   Ht 5' 6" (1.676 m)   BMI 32.35 kg/m²     Physical Exam  Constitutional:       General: She is not in acute distress.     Appearance: Normal appearance. She is not ill-appearing.   HENT:      Head: Normocephalic and atraumatic.      Right Ear: Tympanic membrane, ear canal and external ear normal. There is impacted cerumen.      Left Ear: Tympanic membrane, ear canal and external ear normal. There is impacted cerumen.      Nose: Nose normal. No congestion or rhinorrhea.      Mouth/Throat:      Mouth: Mucous membranes are moist.      Pharynx: Oropharynx is clear. Uvula midline. No oropharyngeal exudate or posterior oropharyngeal erythema.     Eyes:      Extraocular Movements: Extraocular movements intact.      Conjunctiva/sclera: Conjunctivae normal.      Pupils: Pupils are equal, round, and reactive to light.   Neck:      Thyroid: No thyroid mass.   Pulmonary:      Effort: Pulmonary effort is normal. No respiratory distress.   Musculoskeletal:         General: Normal range of motion.      Cervical back: Full passive range of motion without pain.   Lymphadenopathy:      Cervical: No cervical adenopathy.   Neurological:      General: No focal deficit present.      Mental Status: She is alert and oriented to person, place, and time.   Psychiatric:         Mood and Affect: Mood normal.         Behavior: Behavior normal. Behavior is cooperative.         Thought Content: Thought content normal.         Judgment: Judgment normal.                Procedure Note    CHIEF COMPLAINT:  Cerumen Impaction    Description:  The patient was seated in an exam chair.  An ear speculum was placed in the right EAC and was examined under the microscope.  Suction and/or loop curettes were used to remove a large cerumen impaction.  The tympanic membrane was visualized and was normal in appearance.  The procedure was repeated on the left side in a similar fashion.  The TM was intact " and normal on this side as well.  The patient tolerated the procedure well.     Assessment:     1. Bilateral impacted cerumen    2. Soft palate ulceration        Plan:     Bilateral impacted cerumen    Soft palate ulceration    Cerumen impaction:  Removed today without difficulty.  I would recommend the use of a wax softening drop, either over the counter Debrox or mineral oil, on a weekly basis.  I also instructed the patient to avoid Qtips.     We discussed that the spots on the soft palate could be secondary to trauma. I advised her to monitor symptoms and return to clinic if symptoms persist or worsen. I recommended that she try swishing and spitting equal parts Mylanta and Benadryl.

## 2025-04-24 NOTE — PRE-PROCEDURE INSTRUCTIONS
Spoke with patient regarding procedure scheduled on 4.29     Arrival time 0845     Has patient been sick with fever or on antibiotics within the last 7 days? No     Does the patient have any open wounds, sores or rashes? No     Does the patient have any recent fractures? no     Has patient received a vaccination within the last 7 days? No     Received the COVID vaccination? yes     Has the patient stopped all medications as directed? na     Does patient have a pacemaker, defibrillator, or implantable stimulator? No     Does the patient have a ride to and from procedure and someone reliable to remain with patient?  ebony      Is the patient diabetic? no      Does the patient have sleep apnea? Or use O2 at home? no     Is the patient receiving sedation? Yes      Is the patient instructed to remain NPO beginning at midnight the night before their procedure? yes     Procedure location confirmed with patient? Yes     Covid- Denies signs/symptoms. Instructed to notify PAT/MD if any changes.

## 2025-04-25 RX ORDER — FLUTICASONE PROPIONATE 50 MCG
2 SPRAY, SUSPENSION (ML) NASAL DAILY
Qty: 48 G | Refills: 3 | Status: SHIPPED | OUTPATIENT
Start: 2025-04-25

## 2025-04-29 ENCOUNTER — HOSPITAL ENCOUNTER (OUTPATIENT)
Facility: HOSPITAL | Age: 80
Discharge: HOME | End: 2025-04-29
Attending: PHYSICAL MEDICINE & REHABILITATION | Admitting: PHYSICAL MEDICINE & REHABILITATION
Payer: MEDICARE

## 2025-04-29 VITALS
BODY MASS INDEX: 32.42 KG/M2 | RESPIRATION RATE: 16 BRPM | HEART RATE: 74 BPM | SYSTOLIC BLOOD PRESSURE: 126 MMHG | DIASTOLIC BLOOD PRESSURE: 62 MMHG | WEIGHT: 201.75 LBS | HEIGHT: 66 IN | OXYGEN SATURATION: 95 % | TEMPERATURE: 98 F

## 2025-04-29 DIAGNOSIS — M17.9 KNEE OSTEOARTHRITIS: ICD-10-CM

## 2025-04-29 DIAGNOSIS — M17.0 PRIMARY OSTEOARTHRITIS OF BOTH KNEES: Primary | ICD-10-CM

## 2025-04-29 PROCEDURE — 20610 DRAIN/INJ JOINT/BURSA W/O US: CPT | Mod: 50 | Performed by: PHYSICAL MEDICINE & REHABILITATION

## 2025-04-29 PROCEDURE — 63600175 PHARM REV CODE 636 W HCPCS: Mod: JZ,TB | Performed by: PHYSICAL MEDICINE & REHABILITATION

## 2025-04-29 PROCEDURE — 20610 DRAIN/INJ JOINT/BURSA W/O US: CPT | Mod: 50,,, | Performed by: PHYSICAL MEDICINE & REHABILITATION

## 2025-04-29 PROCEDURE — 25000003 PHARM REV CODE 250: Performed by: PHYSICAL MEDICINE & REHABILITATION

## 2025-04-29 PROCEDURE — 25500020 PHARM REV CODE 255: Performed by: PHYSICAL MEDICINE & REHABILITATION

## 2025-04-29 PROCEDURE — 77002 NEEDLE LOCALIZATION BY XRAY: CPT | Mod: 26,,, | Performed by: PHYSICAL MEDICINE & REHABILITATION

## 2025-04-29 PROCEDURE — A9585 GADOBUTROL INJECTION: HCPCS | Performed by: PHYSICAL MEDICINE & REHABILITATION

## 2025-04-29 RX ORDER — METHYLPREDNISOLONE ACETATE 40 MG/ML
INJECTION, SUSPENSION INTRA-ARTICULAR; INTRALESIONAL; INTRAMUSCULAR; SOFT TISSUE
Status: DISCONTINUED | OUTPATIENT
Start: 2025-04-29 | End: 2025-04-29 | Stop reason: HOSPADM

## 2025-04-29 RX ORDER — GADOBUTROL 604.72 MG/ML
INJECTION INTRAVENOUS
Status: DISCONTINUED | OUTPATIENT
Start: 2025-04-29 | End: 2025-04-29 | Stop reason: HOSPADM

## 2025-04-29 RX ORDER — BUPIVACAINE HYDROCHLORIDE 2.5 MG/ML
INJECTION, SOLUTION EPIDURAL; INFILTRATION; INTRACAUDAL; PERINEURAL
Status: DISCONTINUED | OUTPATIENT
Start: 2025-04-29 | End: 2025-04-29 | Stop reason: HOSPADM

## 2025-04-29 RX ORDER — INDOMETHACIN 25 MG/1
CAPSULE ORAL
Status: DISCONTINUED | OUTPATIENT
Start: 2025-04-29 | End: 2025-04-29 | Stop reason: HOSPADM

## 2025-04-29 RX ORDER — ONDANSETRON HYDROCHLORIDE 2 MG/ML
4 INJECTION, SOLUTION INTRAVENOUS ONCE AS NEEDED
Status: DISCONTINUED | OUTPATIENT
Start: 2025-04-29 | End: 2025-04-29 | Stop reason: HOSPADM

## 2025-04-29 NOTE — DISCHARGE SUMMARY
Discharge Note  Short Stay      SUMMARY     Admit Date: 4/29/2025    Attending Physician: Julio Cesar Henriquez MD        Discharge Physician: Julio Cesar Henriquez MD        Discharge Date: 4/29/2025 10:28 AM    Procedure(s) (LRB):  Bilateral Knee joint injection (Bilateral)    Final Diagnosis: Bilateral primary osteoarthritis of knee [M17.0]    Disposition: Home or self care    Patient Instructions:   Current Discharge Medication List        CONTINUE these medications which have NOT CHANGED    Details   aspirin 81 MG Chew Take 81 mg by mouth once daily.      gabapentin (NEURONTIN) 300 MG capsule Take 1 capsule (300 mg total) by mouth 2 (two) times daily.  Qty: 180 capsule, Refills: 3      hydroCHLOROthiazide (HYDRODIURIL) 25 MG tablet Take 1 tablet (25 mg total) by mouth once daily.  Qty: 90 tablet, Refills: 3    Comments: .  Associated Diagnoses: Edema, unspecified type      LORazepam (ATIVAN) 0.5 MG tablet Take 0.5 mg by mouth every 6 (six) hours as needed.      propranoloL (INDERAL LA) 120 MG 24 hr capsule TAKE 1 CAPSULE ONCE DAILY.  Qty: 90 capsule, Refills: 3      acetaminophen (TYLENOL) 325 MG tablet Take 325 mg by mouth as needed.       C/sourcherry/celery/grape seed (TART CHERRY ORAL) Take by mouth.      esomeprazole (NEXIUM) 40 MG capsule Take 40 mg by mouth once daily.      flaxseed oiL Oil by Misc.(Non-Drug; Combo Route) route.      fluoride, sodium, (PREVIDENT 5000) 1.1 % Pste Place onto teeth.      fluticasone propionate (FLONASE) 50 mcg/actuation nasal spray 2 sprays (100 mcg total) by Each Nostril route once daily.  Qty: 48 g, Refills: 3      KLOR-CON M20 20 mEq tablet TAKE 1 TABLET ONCE DAILY  Qty: 90 tablet, Refills: 3    Associated Diagnoses: Hypokalemia      multivitamin capsule Take 1 capsule by mouth once daily.                 Discharge Diagnosis: Bilateral primary osteoarthritis of knee [M17.0]  Condition on Discharge: Stable with no complications to procedure   Diet on Discharge: Same as  before.  Activity: as per instruction sheet.  Discharge to: Home with a responsible adult.  Follow up: 2-4 weeks       Please call the office at [phone] if you experience any weakness or loss of sensation, fever > 101.5, pain uncontrolled with oral medications, persistent nausea/vomiting/or diarrhea, redness or drainage from the incisions, or any other worrisome concerns. If physician on call was not reached or could not communicate with our office for any reason please go to the nearest emergency department       Leno Prieto (DO)  Orthopaedic Surgery  [address] [place] [address]  Phone: [phone]  Fax: [phone]  Follow Up Time:

## 2025-04-29 NOTE — PLAN OF CARE
Patient d/c home in stable condition via wheelchair with ride. Verbalized understanding of d/c instructions. Patient voiced no complaints. Patient stood at side of bed, walked steps with no new motor deficits. Neuro intact.

## 2025-04-29 NOTE — H&P
"HPI  Patient presenting for Procedure(s) (LRB):  Bilateral Knee joint injection (Bilateral)     No health changes since previous encounter    Past Medical History:   Diagnosis Date    Anxiety     Arthritis     Breast cancer 2008    Depression     Fluid retention     Headache     Hyperlipidemia     IBS (irritable bowel syndrome)     Inflammatory bowel disease     lymphocitic colitis s/p radiation    Low back pain, non-specific 10/2/2019    Lymphocytic colitis 2008    in remission    Obesity     Pneumonia     around age 42, did not require hospitalization    Vertigo     intermittent     Past Surgical History:   Procedure Laterality Date    ADENOIDECTOMY  1951    APPENDECTOMY      BREAST SURGERY Bilateral 03/2018    bilateral mastectomy    CHOLECYSTECTOMY  2002    COLON SURGERY      colonoscopy, polyp removed    COLONOSCOPY W/ BIOPSIES AND POLYPECTOMY  07/2011    EYE SURGERY      cataracts    HYSTERECTOMY      OOPHORECTOMY Left     TONSILLECTOMY      TUBAL LIGATION       Review of patient's allergies indicates:   Allergen Reactions    Sutures Swelling    Adhesive tape-silicones Dermatitis and Blisters     Also allergic to anchor sutures    Phipps flavor      Can't eat any berries except strawberries. Strawberries does not cause any problems.    Demerol [meperidine]     Pantoprazole      Face flush/ tight chest    Shellfish containing products         Medications Ordered Prior to Encounter[1]     PMHx, PSHx, Allergies, Medications reviewed in epic    ROS negative except pain complaints in HPI    OBJECTIVE:    BP (!) 179/82 (BP Location: Right arm, Patient Position: Sitting)   Pulse 70   Temp 97.7 °F (36.5 °C) (Temporal)   Resp 18   Ht 5' 6" (1.676 m)   Wt 91.5 kg (201 lb 11.5 oz)   SpO2 95%   Breastfeeding No   BMI 32.56 kg/m²     PHYSICAL EXAMINATION:    GENERAL: Well appearing, in no acute distress, alert and oriented x3.  PSYCH:  Mood and affect appropriate.  SKIN: Skin color, texture, turgor normal, no rashes " or lesions which will impact the procedure.  CV: RRR with palpation of the radial artery.  PULM: No evidence of respiratory difficulty, symmetric chest rise. Clear to auscultation.  NEURO: Cranial nerves grossly intact.    Plan:    Proceed with procedure as planned Procedure(s) (LRB):  Bilateral Knee joint injection (Bilateral)    Julio Cesar Henriquez MD  04/29/2025                 [1]   No current facility-administered medications on file prior to encounter.     Current Outpatient Medications on File Prior to Encounter   Medication Sig Dispense Refill    aspirin 81 MG Chew Take 81 mg by mouth once daily.      gabapentin (NEURONTIN) 300 MG capsule Take 1 capsule (300 mg total) by mouth 2 (two) times daily. 180 capsule 3    hydroCHLOROthiazide (HYDRODIURIL) 25 MG tablet Take 1 tablet (25 mg total) by mouth once daily. 90 tablet 3    LORazepam (ATIVAN) 0.5 MG tablet Take 0.5 mg by mouth every 6 (six) hours as needed.      propranoloL (INDERAL LA) 120 MG 24 hr capsule TAKE 1 CAPSULE ONCE DAILY. 90 capsule 3    acetaminophen (TYLENOL) 325 MG tablet Take 325 mg by mouth as needed.       C/sourcherry/celery/grape seed (TART CHERRY ORAL) Take by mouth.      esomeprazole (NEXIUM) 40 MG capsule Take 40 mg by mouth once daily.      flaxseed oiL Oil by Misc.(Non-Drug; Combo Route) route.      fluoride, sodium, (PREVIDENT 5000) 1.1 % Pste Place onto teeth.      KLOR-CON M20 20 mEq tablet TAKE 1 TABLET ONCE DAILY 90 tablet 3    multivitamin capsule Take 1 capsule by mouth once daily.

## 2025-04-29 NOTE — DISCHARGE INSTRUCTIONS

## 2025-04-29 NOTE — OP NOTE
Procedure Note:     bilateral Knee steroid injection under fluoroscopy    04/29/2025                                 Surgeon: Julio Cesar Henriquez MD    Assistant: None     Sedation: None    Pre-Op Diagnosis:  Bilateral primary osteoarthritis of knee [M17.0]    Post-Op Diagnosis: Bilateral primary osteoarthritis of knee [M17.0]     EBL: None     Complications: None     Specimens: None     Description of procedure:     After written consent was obtained, patient placed in supine position.  The area over the  lateral aspect of the bilateral  knee(s) joint prepped with chlorhexidine.  The area was draped in the usual sterile fashion.  Approximately 5 mL 1% lidocaine was infiltrated into the skin overlying the predetermined entry point. A 22 gauge spinal needle was then advanced under fluoroscopy in the AP views into the knee joint. After negative aspiration there was injection of 1 mL radio opaque contrast dye to confirm needle location within the joint, and no evidence of intravascular spread. This was followed by injection of 9 mL of 0.25% bupivacaine +40mg of depo-medrol into the joint space. Displacement of the contrast indicated that the medication went into the area of the joint space. Needle was withdrawn and a sterile band-aid applied to the skin.     Patient tolerated the procedure well, and was reporting improvement of pain symptoms after the injection.  She was discharged from the clinic in stable condition.

## 2025-06-03 ENCOUNTER — OFFICE VISIT (OUTPATIENT)
Dept: PAIN MEDICINE | Facility: CLINIC | Age: 80
End: 2025-06-03
Payer: MEDICARE

## 2025-06-03 VITALS
RESPIRATION RATE: 17 BRPM | HEIGHT: 66 IN | WEIGHT: 203.69 LBS | SYSTOLIC BLOOD PRESSURE: 122 MMHG | HEART RATE: 63 BPM | BODY MASS INDEX: 32.73 KG/M2 | DIASTOLIC BLOOD PRESSURE: 82 MMHG

## 2025-06-03 DIAGNOSIS — M25.562 CHRONIC PAIN OF BOTH KNEES: ICD-10-CM

## 2025-06-03 DIAGNOSIS — M25.561 CHRONIC PAIN OF BOTH KNEES: ICD-10-CM

## 2025-06-03 DIAGNOSIS — M17.0 PRIMARY OSTEOARTHRITIS OF BOTH KNEES: Primary | ICD-10-CM

## 2025-06-03 DIAGNOSIS — G89.29 CHRONIC PAIN OF BOTH KNEES: ICD-10-CM

## 2025-06-03 PROCEDURE — 99999 PR PBB SHADOW E&M-EST. PATIENT-LVL III: CPT | Mod: PBBFAC,,, | Performed by: NURSE PRACTITIONER

## 2025-06-03 PROCEDURE — 99213 OFFICE O/P EST LOW 20 MIN: CPT | Mod: PBBFAC | Performed by: NURSE PRACTITIONER

## 2025-06-16 ENCOUNTER — LAB VISIT (OUTPATIENT)
Dept: LAB | Facility: HOSPITAL | Age: 80
End: 2025-06-16
Attending: FAMILY MEDICINE
Payer: MEDICARE

## 2025-06-16 ENCOUNTER — OFFICE VISIT (OUTPATIENT)
Dept: INTERNAL MEDICINE | Facility: CLINIC | Age: 80
End: 2025-06-16
Payer: MEDICARE

## 2025-06-16 VITALS
WEIGHT: 202.81 LBS | TEMPERATURE: 97 F | BODY MASS INDEX: 32.59 KG/M2 | SYSTOLIC BLOOD PRESSURE: 128 MMHG | HEIGHT: 66 IN | DIASTOLIC BLOOD PRESSURE: 80 MMHG | HEART RATE: 87 BPM | OXYGEN SATURATION: 93 %

## 2025-06-16 DIAGNOSIS — M17.0 PRIMARY OSTEOARTHRITIS OF BOTH KNEES: ICD-10-CM

## 2025-06-16 DIAGNOSIS — E87.6 HYPOKALEMIA: ICD-10-CM

## 2025-06-16 DIAGNOSIS — R60.9 EDEMA, UNSPECIFIED TYPE: ICD-10-CM

## 2025-06-16 DIAGNOSIS — N39.0 URINARY TRACT INFECTION WITHOUT HEMATURIA, SITE UNSPECIFIED: ICD-10-CM

## 2025-06-16 DIAGNOSIS — I10 PRIMARY HYPERTENSION: Primary | ICD-10-CM

## 2025-06-16 PROCEDURE — 99213 OFFICE O/P EST LOW 20 MIN: CPT | Mod: PBBFAC | Performed by: FAMILY MEDICINE

## 2025-06-16 PROCEDURE — 87086 URINE CULTURE/COLONY COUNT: CPT

## 2025-06-16 PROCEDURE — 99214 OFFICE O/P EST MOD 30 MIN: CPT | Mod: S$PBB,,, | Performed by: FAMILY MEDICINE

## 2025-06-16 PROCEDURE — 99999 PR PBB SHADOW E&M-EST. PATIENT-LVL III: CPT | Mod: PBBFAC,,, | Performed by: FAMILY MEDICINE

## 2025-06-16 RX ORDER — MINOCYCLINE HYDROCHLORIDE 50 MG/1
50 CAPSULE ORAL DAILY
Start: 2025-06-16

## 2025-06-16 RX ORDER — POTASSIUM CHLORIDE 20 MEQ/1
20 TABLET, EXTENDED RELEASE ORAL DAILY
Qty: 90 TABLET | Refills: 3 | Status: SHIPPED | OUTPATIENT
Start: 2025-06-16

## 2025-06-16 NOTE — PROGRESS NOTES
Patient ID: Teresa Orozco is a 79 y.o. female.    Chief Complaint: Follow-up    History of Present Illness    Ms. Orozco presents today for follow up.    She reports improved knee symptoms with Gabapentin after dose adjustments. Patellar straps provide significant symptom relief. She requires cane for balance support. She has arthritis in her hands.    She monitors blood pressure at home, reporting measurements that tend to run lower than office readings. She is comfortable with the readings as long as they remain in a similar range.    She has a history of GERD with previous esophageal damage and IBS. She is due for colonoscopy. She reports difficulty taking potassium tablets due to their large size, noting that even when broken, they sometimes get stuck in her throat. Her gastroenterologist has inquired about her water intake and plans to perform an endoscopy to evaluate her esophagus.    She has a history of recurrent UTIs, typically showing E. coli on culture, but denies current symptoms. She attributes recurrent UTIs to hygiene difficulties due to presence of a large hemorrhoid and hand arthritis.      ROS:  General: -fever, -chills, -fatigue, -weight gain, -weight loss  Eyes: -vision changes, -redness, -discharge  ENT: -ear pain, -nasal congestion, -sore throat  Cardiovascular: -chest pain, -palpitations, -lower extremity edema  Respiratory: -cough, -shortness of breath  Gastrointestinal: -abdominal pain, -nausea, -vomiting, -diarrhea, -constipation, -blood in stool, +hemorrhoids, +indigestion, +difficulty swallowing  Genitourinary: -dysuria, -hematuria, -frequency  Musculoskeletal: +joint pain, -muscle pain, +limb pain, +joint swelling  Skin: -rash, -lesion  Neurological: -headache, -dizziness, -numbness, -tingling, +balance issues         Physical Exam    General: In no acute distress. Not ill-appearing or diaphoretic.  Neck: Normal thyroid. No cervical lymphadenopathy. 2+ carotid pulses.  Cardiovascular:  Normal rate and regular  rhythm. No murmur heard. No gallop.  Pulmonary: Pulmonary effort is normal. No respiratory distress. No wheezing. No rhonchi. No rales.  Abdominal:  Nondistended. .  Neurological: Alert.  Psychiatric: Mood normal. Behavior normal.  Vitals: Blood pressure: 128/80.         Assessment & Plan    KNEE PAIN:  - Ms. Orozco reports improvement in knee pain with Gabapentin, patellar straps, and cane for support.  - Noted improvement in walking ability.    OSTEOARTHRITIS OF HAND:  - Ms. Orozco mentions arthritis has developed in hands, affecting hygiene.    GASTROESOPHAGEAL REFLUX DISEASE (GERD):  - Ms. Orozco reports past esophageal damage and current sensation of medication getting stuck in the esophagus.  - Referred to gastroenterologist Dr. Escoto who will perform an endoscopy to check for irritation from medication.  - Upcoming colonoscopy with Dr. Escoto will also evaluate esophageal issues related to GERD.    IRRITABLE BOWEL SYNDROME (IBS):  - Ms. Orozco reports having IBS.    URINARY TRACT INFECTION:  - Ms. Orozco had a urinary infection that was treated and reports no current symptoms.  - Ordered urinalysis to recheck for UTI.  - Ms. Orozco reports E. coli found in tests during UTI episodes.  - Discussed potential link between recurrent UTIs and hygiene challenges related to large hemorrhoid.    HEMORRHOIDS:  - Ms. Orozco reports having a large hemorrhoid.  - Upcoming colonoscopy with Dr. Escoto will address hemorrhoid and consider treatment options.    MEDICATION MANAGEMENT:  - Ms. Orozco reports potassium tablets sometimes get stuck in throat.  - Recent lab work shows normal potassium levels.  - Will continue potassium tablets.    MOBILITY ASSISTANCE:  - Ms. Orozco uses a cane for balance support.    FOLLOW-UP:  - /80, optimal.          Rosacea being treated by a dermatologist with medicine    Follow up in about 6 months (around 12/16/2025).    This note was generated with the assistance of [employer]  listening technology. Verbal consent was obtained by the patient and accompanying visitor(s) for the recording of patient appointment to facilitate this note. I attest to having reviewed and edited the generated note for accuracy, though some syntax or spelling errors may persist. Please contact the author of this note for any clarification.

## 2025-06-17 LAB — BACTERIA UR CULT: NORMAL

## 2025-06-18 ENCOUNTER — RESULTS FOLLOW-UP (OUTPATIENT)
Dept: INTERNAL MEDICINE | Facility: CLINIC | Age: 80
End: 2025-06-18

## 2025-07-28 NOTE — PROGRESS NOTES
Chronic Pain Note   Referring Physician: No ref. provider found    PCP: Jaiden Joshi MD    Chief Complaint:   Chief Complaint   Patient presents with    Knee Pain     bilateral        SUBJECTIVE:  Interval History (8/4/2025):  Patient Teresa Orozco presents today for follow-up visit.  Patient is being seen for follow up of bilateral knee pain with L>R. Pain is 8/10. She is almost ready to think about a knee replacement but would like to try steroid injections again because they provided some relief for a couple of months. Pain started returning a couple weeks ago and has been really bothering her and she feels like her left knee catches at times.  Patient denies night fever/night sweats, urinary incontinence, bowel incontinence, significant weight loss and significant motor weakness.   Patient denies any other complaints or concerns at this time.      Interval History (6/3/2025):  Patient Teresa Orozco presents today for follow-up visit.  Patient was last seen on 4/29/2025 bilateral knee joint injection with 50% relief. Left knee responded better than the right knee. Pain worse with prolonged standing and walking.   She has some right wrist pain at times but it is stable, does not want to see ortho today.  Patient denies any other complaints or concerns at this time.      Interval History (4/15/2025):  Patient Teresa Orozco presents today for follow-up visit.  Patient is here today for followup. Chief complaint is bilateral knee pain. She has had xrays and seen ortho in the past but has not had any injections or procedures. Pain is a 3/10 today.   She also has some chronic neck and trapezius pain but has been doing some at home exercises which seems to have helped some.  She has chronic migraines- followed by neurology- with control with propranolol.  Patient denies any other complaints or concerns at this time.        2/17/2025  Teresa Orozco is a 79 y.o. female, right-hand dominant, who presents to the clinic for  the evaluation of neck pain.  She was referred by Orthopedics for further evaluation and management of this pain.  She has past medical history of migraine headaches, cervical DDD, anxiety, vertigo, hyperlipidemia, breast cancer history, osteoporosis, IBS, GERD, lymphocytic colitis, polyarthritis, multiple other medical comorbidities as listed in her chart.  The pain started several years ago and symptoms have been worsening.The pain is located in the cervical myofascial area and radiates to the bilateral shoulders.  She also locates pain in the bilateral hands and fingers along with her right knee pain and lower back pain.  The pain is described as sharp, stabbing, aching and is rated as 0/10. The pain is rated with a score of  0/10 on the BEST day and a score of 6/10 on the WORST day.  Symptoms interfere with daily activity. The pain is exacerbated by activities and she also states that she has hand and finger stiffness and pain when she 1st wakes up.  The pain is mitigated by nothing in particular.    Patient denies night fever/night sweats, urinary incontinence, bowel incontinence, significant weight loss, significant motor weakness, and loss of sensations.    Pain Disability Index Review:         8/4/2025     1:03 PM 6/3/2025     1:38 PM 4/15/2025    10:36 AM   Last 3 PDI Scores   Pain Disability Index (PDI) 56 36 21       Non-Pharmacologic Treatments:  Physical Therapy/Home Exercise: yes, currently active with daily home exercises  Ice/Heat:yes  TENS: no  Acupuncture: no  Massage: yes  Chiropractic: yes    Other: no      Pain Medications:  - Opioids:  None recently  - Adjuvant Medications:  Lorazepam, Tylenol  - Anti-Coagulants:  None     report:  Reviewed and consistent with medication use as prescribed.    Pain Procedures:   4/29/2025 bilateral knee joint injection with 50% relief.      Imaging:   MRI cervical spine 09/23/2020:  Alignment: Straightening of the normal cervical lordosis.     Vertebrae:  Cervical vertebral body heights appear maintained.  There are minor endplate degenerative changes at C5-C6 and C6-C7.  Additional degenerative change with mild edema at C4-C5.  No evidence of acute fracture.  Marrow signal is otherwise within normal limits.     Discs: Disc desiccation throughout the cervical spine.  Disc height loss is most pronounced at C5-C6 and C6-C7.     Cord: No abnormal cord signal.     Skull base and craniocervical junction: Normal.     Degenerative findings:     C2-C3: Tiny central disc osteophyte complex.  No significant spinal or neural foraminal stenosis.     C3-C4: Broad-based disc osteophyte complex effaces the anterior thecal sac and contacts the ventral cord without significant deformity.  Bilateral right greater than left uncovertebral spurring contributes to moderate right and mild left-sided neural foraminal stenosis.  No significant spinal canal stenosis.     C4-C5: Broad-based disc osteophyte complex contacts the ventral cord with minor deformity.  Prominent right-sided uncovertebral spurring contributes to mild right-sided neural foraminal stenosis.  No significant spinal canal stenosis.     C5-C6: Broad-based disc osteophyte complex with superimposed small central disc protrusion contacts and deforms the ventral cord.  Mild facet arthropathy, ligamentum flavum hypertrophy and uncovertebral spurring contribute to mild spinal canal stenosis and moderate left-sided neural foraminal stenosis.     C6-C7: Broad-based disc osteophyte complex.  Minor effacement of the anterior thecal sac.  No cord contact or mass effect.  Bilateral uncovertebral spurring contributes to moderate bilateral neural foraminal stenosis.  No significant spinal canal stenosis.     C7-T1: Mild broad-based disc osteophyte complex slightly effaces anterior thecal sac.  Bilateral facet arthropathy and ligamentum flavum hypertrophy contribute to mild bilateral neural foraminal stenosis.  No significant spinal canal  stenosis.     Paraspinal muscles & soft tissues: Unremarkable.     Past Medical History:   Diagnosis Date    Anxiety     Arthritis     Breast cancer 2008    Depression     Fluid retention     Headache     Hyperlipidemia     IBS (irritable bowel syndrome)     Inflammatory bowel disease     lymphocitic colitis s/p radiation    Low back pain, non-specific 10/2/2019    Lymphocytic colitis 2008    in remission    Obesity     Pneumonia     around age 42, did not require hospitalization    Primary hypertension 6/16/2025    Vertigo     intermittent     Past Surgical History:   Procedure Laterality Date    ADENOIDECTOMY  1951    APPENDECTOMY      BREAST SURGERY Bilateral 03/2018    bilateral mastectomy    CHOLECYSTECTOMY  2002    COLON SURGERY      colonoscopy, polyp removed    COLONOSCOPY W/ BIOPSIES AND POLYPECTOMY  07/2011    EYE SURGERY      cataracts    HYSTERECTOMY      LUMBAR PARAVERTEBRAL FACET JOINT NERVE BLOCK Bilateral 4/29/2025    Procedure: Bilateral Knee joint injection;  Surgeon: Julio Cesar Henriquez MD;  Location: [facility];  Service: Pain Management;  Laterality: Bilateral;    OOPHORECTOMY Left     TONSILLECTOMY      TUBAL LIGATION       Social History[1]  Family History   Problem Relation Name Age of Onset    Cancer Mother Tomasa De Dios     Parkinsonism Mother Tomasa De Dios     Cancer Father Frank De Dios         prostate    Heart disease Father Frank De Dios     Arthritis Father Frank De Dios     No Known Problems Sister      Diabetes Brother Sunny De Dios         currently controlled through diet    Cancer Brother Sunny De Dios         prostate    No Known Problems Son Sergey     Thyroid disease Neg Hx      Migraines Neg Hx      Asthma Neg Hx         Review of patient's allergies indicates:   Allergen Reactions    Sutures Swelling    Adhesive tape-silicones Dermatitis and Blisters     Also allergic to anchor sutures    Phipps flavor      Can't eat any berries except strawberries. Strawberries does not cause any problems.     Demerol [meperidine]     Pantoprazole      Face flush/ tight chest    Shellfish containing products        Current Outpatient Medications   Medication Sig    acetaminophen (TYLENOL) 325 MG tablet Take 325 mg by mouth as needed.     aspirin 81 MG Chew Take 81 mg by mouth once daily.    C/sourcherry/celery/grape seed (TART CHERRY ORAL) Take by mouth.    esomeprazole (NEXIUM) 40 MG capsule Take 40 mg by mouth once daily.    flaxseed oiL Oil by Misc.(Non-Drug; Combo Route) route.    fluoride, sodium, (PREVIDENT 5000) 1.1 % Pste Place onto teeth.    fluticasone propionate (FLONASE) 50 mcg/actuation nasal spray 2 sprays (100 mcg total) by Each Nostril route once daily.    gabapentin (NEURONTIN) 300 MG capsule Take 1 capsule (300 mg total) by mouth 2 (two) times daily.    hydroCHLOROthiazide (HYDRODIURIL) 25 MG tablet Take 1 tablet (25 mg total) by mouth once daily.    LORazepam (ATIVAN) 0.5 MG tablet Take 0.5 mg by mouth every 6 (six) hours as needed.    minocycline (MINOCIN,DYNACIN) 50 MG Cap Take 1 capsule (50 mg total) by mouth once daily. Prescribed by dermatology    multivitamin capsule Take 1 capsule by mouth once daily.    potassium chloride SA (KLOR-CON M20) 20 MEQ tablet Take 1 tablet (20 mEq total) by mouth once daily.    propranoloL (INDERAL LA) 120 MG 24 hr capsule TAKE 1 CAPSULE ONCE DAILY.     No current facility-administered medications for this visit.       Review of Systems     GENERAL:  No weight loss, malaise or fevers.  HEENT:   No recent changes in vision or hearing  NECK:  Negative for lumps, no difficulty with swallowing.  RESPIRATORY:  Negative for cough, wheezing or shortness of breath, patient denies any recent URI.  CARDIOVASCULAR:  Negative for chest pain, leg swelling or palpitations.  GI:  Negative for abdominal discomfort, blood in stools or black stools or change in bowel habits.  MUSCULOSKELETAL:  See HPI.  SKIN:  Negative for lesions, rash, and itching.  PSYCH:  No mood disorder or recent  "psychosocial stressors.  Patients sleep is not disturbed secondary to pain.  HEMATOLOGY/LYMPHOLOGY:  Negative for prolonged bleeding, bruising easily or swollen nodes.  Patient is not currently taking any anti-coagulants  NEURO:   No history of headaches, syncope, paralysis, seizures or tremors.  All other reviewed and negative other than HPI.    OBJECTIVE:    /80 (BP Location: Right arm, Patient Position: Sitting)   Pulse 70   Resp 17   Ht 5' 6" (1.676 m)   Wt 89.9 kg (198 lb 3.1 oz)   BMI 31.99 kg/m²         Physical Exam    GENERAL: Well appearing, in no acute distress, alert and oriented x3.  PSYCH:  Mood and affect appropriate.  SKIN: Skin color, texture, turgor normal, no rashes or lesions.  HEAD/FACE:  Normocephalic, atraumatic. Cranial nerves grossly intact.  NECK:  Moderate pain to palpation over the cervical paraspinous muscles. Spurling negative to radicular pain.  Mild-to-moderate pain with neck flexion, extension, and lateral flexion.   CV: RRR with palpation of the radial artery.  PULM: No evidence of respiratory difficulty, symmetric chest rise.  GI:  Soft and non-tender.  BACK:  Moderate pain to palpation over the facet joints of the lumbar spine and lumbar paraspinals.  Limited range of motion secondary to pain reproduction.   EXTREMITIES: No deformities, edema, or skin discoloration. Good capillary refill.  MUSCULOSKELETAL: Shoulder and hip provocative maneuvers are unremarkable.  There is moderate pain with palpation over the sacroiliac joints bilaterally.  FABERs test is equivocal.  FADIRs test is equivocal.   Bilateral upper and lower extremity strength is normal and symmetric.  No atrophy or tone abnormalities are noted. Bilateral knees with some pain with full extension and flexion.   NEURO: Bilateral upper and lower extremity coordination and muscle stretch reflexes are physiologic and symmetric.  Plantar response are downgoing. No clonus.  No loss of sensation is noted.  GAIT:  " Slow, antalgic.      LABS:  Lab Results   Component Value Date    WBC 10.01 08/28/2023    HGB 13.3 08/28/2023    HCT 41.8 08/28/2023    MCV 92 08/28/2023     08/28/2023       CMP  Sodium   Date Value Ref Range Status   11/12/2024 142 136 - 145 mmol/L Final     Potassium   Date Value Ref Range Status   11/12/2024 4.2 3.5 - 5.1 mmol/L Final     Chloride   Date Value Ref Range Status   11/12/2024 100 95 - 110 mmol/L Final     CO2   Date Value Ref Range Status   11/12/2024 30 (H) 23 - 29 mmol/L Final     Glucose   Date Value Ref Range Status   11/12/2024 113 (H) 70 - 110 mg/dL Final     BUN   Date Value Ref Range Status   11/12/2024 14 8 - 23 mg/dL Final     Creatinine   Date Value Ref Range Status   11/12/2024 0.8 0.5 - 1.4 mg/dL Final     Calcium   Date Value Ref Range Status   11/12/2024 10.2 8.7 - 10.5 mg/dL Final     Total Protein   Date Value Ref Range Status   11/12/2024 7.4 6.0 - 8.4 g/dL Final     Albumin   Date Value Ref Range Status   11/12/2024 3.8 3.5 - 5.2 g/dL Final     Total Bilirubin   Date Value Ref Range Status   11/12/2024 0.5 0.1 - 1.0 mg/dL Final     Comment:     For infants and newborns, interpretation of results should be based  on gestational age, weight and in agreement with clinical  observations.    Premature Infant recommended reference ranges:  Up to 24 hours.............<8.0 mg/dL  Up to 48 hours............<12.0 mg/dL  3-5 days..................<15.0 mg/dL  6-29 days.................<15.0 mg/dL       Alkaline Phosphatase   Date Value Ref Range Status   11/12/2024 104 40 - 150 U/L Final     AST   Date Value Ref Range Status   11/12/2024 20 10 - 40 U/L Final     ALT   Date Value Ref Range Status   11/12/2024 21 10 - 44 U/L Final     Anion Gap   Date Value Ref Range Status   11/12/2024 12 8 - 16 mmol/L Final     eGFR if    Date Value Ref Range Status   10/18/2021 >60.0 >60 mL/min/1.73 m^2 Final     eGFR if non    Date Value Ref Range Status   10/18/2021  >60.0 >60 mL/min/1.73 m^2 Final     Comment:     Calculation used to obtain the estimated glomerular filtration  rate (eGFR) is the CKD-EPI equation.          Lab Results   Component Value Date    HGBA1C 6.0 (H) 05/28/2024             ASSESSMENT: 79 y.o. year old female with chronic neck, shoulder, and lower back pain, consistent with     1. Primary osteoarthritis of both knees  Case Request-RAD/Other Procedure Area: Bilateral Knee joint injection      2. Chronic pain of both knees                    PLAN:   - Interventions:  will schedule bilateral IA knee injections  Explained the risks and benefits of the procedure in detail with the patient today in clinic along with alternative treatment options, and the patient elected to pursue the intervention.      Joint space appears too narrow for viscosupplementation   Consider genicular nerve block if does not get sufficient relief    - Anticoagulation use:  None    - Medications: I have stressed the importance of physical activity and a home exercise plan to help with pain and improve health., Patient can continue with medications for now since they are providing benefits, using them appropriately, and without side effects., and Start Neurontin 300mg gradually to three times a day to help with radicular pain.     Continue gabapentin 300-600mg QHS. We have reviewed potential side effects of this medication including daytime somnolence, weight gain and peripheral edema    May take tylenol up to 2g daily as needed      - Therapy:  Provide the patient with home exercises to implement into her regular routine.    - Psychological:  Discussed coping mechanisms to help address chronic pain issues    - Labs:   Labs reviewed- minimally elevated sed rate, improved from previous testing    - Imaging: Reviewed available imaging with patient and answered any questions they had regarding study.  Xray bilateral knees reviewed    - Consults/Referrals:  None at this time    - Records:   Reviewed/Obtain old records from outside physicians and imaging    - Follow up visit: return to clinic 4 weeks after procedure    - Counseled patient regarding the importance of activity modification and physical therapy    - This condition does not require this patient to take time off of work, and the primary goal of our Pain Management services is to improve the patient's functional capacity.    - Patient Questions: Answered all of the patient's questions regarding diagnosis, therapy, and treatment        The above plan and management options were discussed at length with patient. Patient is in agreement with the above and verbalized understanding.    I discussed the goals of interventional chronic pain management with the patient on today's visit.  I explained the utility of injections for diagnostic and therapeutic purposes.  We discussed a multimodal approach to pain including treating the patient's given worst pain at any given time.  We will use a systematic approach to addressing pain.  We will also adopt a multimodal approach that includes injections, adjuvant medications, physical therapy, at times psychiatry.  There may be a limited role for opioid use intermittently in the treatment of pain, more particularly for acute pain although no one approach can be used as a sole treatment modality.    I emphasized the importance of regular exercise, core strengthening and stretching, diet and weight loss as a cornerstone of long-term pain management.      Visit today included increased complexity associated with the care of the episodic problem of chronic pain which was addressed and continue to manage the longitudinal care of the patient due to the serious and/or complex managed problem(s) listed above.      Kirti Echavarria NP  Interventional Pain Management  [facility]    Disclaimer:  This note was prepared using voice recognition system and is likely to have sound alike errors that may have been  overlooked even after proof reading.  Please call me with any questions               [1]   Social History  Socioeconomic History    Marital status:     Number of children: 2   Tobacco Use    Smoking status: Former     Current packs/day: 0.00     Average packs/day: 1 pack/day for 5.6 years (5.6 ttl pk-yrs)     Types: Cigarettes     Start date: 1968     Quit date: 1973     Years since quittin.9    Smokeless tobacco: Never   Substance and Sexual Activity    Alcohol use: No    Drug use: Never    Sexual activity: Yes     Partners: Male     Birth control/protection: Post-menopausal, See Surgical Hx     Social Drivers of Health     Financial Resource Strain: Low Risk  (3/23/2025)    Overall Financial Resource Strain (CARDIA)     Difficulty of Paying Living Expenses: Not hard at all   Food Insecurity: No Food Insecurity (3/23/2025)    Hunger Vital Sign     Worried About Running Out of Food in the Last Year: Never true     Ran Out of Food in the Last Year: Never true   Transportation Needs: No Transportation Needs (3/23/2025)    PRAPARE - Transportation     Lack of Transportation (Medical): No     Lack of Transportation (Non-Medical): No   Physical Activity: Inactive (3/23/2025)    Exercise Vital Sign     Days of Exercise per Week: 0 days     Minutes of Exercise per Session: 0 min   Stress: No Stress Concern Present (3/23/2025)    Citizen of [place] [employer] - Occupational Stress Questionnaire     Feeling of Stress : Not at all   Housing Stability: Low Risk  (3/23/2025)    Housing Stability Vital Sign     Unable to Pay for Housing in the Last Year: No     Number of Times Moved in the Last Year: 0     Homeless in the Last Year: No

## 2025-08-04 ENCOUNTER — OFFICE VISIT (OUTPATIENT)
Dept: PAIN MEDICINE | Facility: CLINIC | Age: 80
End: 2025-08-04
Payer: MEDICARE

## 2025-08-04 VITALS
BODY MASS INDEX: 31.85 KG/M2 | HEART RATE: 70 BPM | DIASTOLIC BLOOD PRESSURE: 80 MMHG | RESPIRATION RATE: 17 BRPM | WEIGHT: 198.19 LBS | HEIGHT: 66 IN | SYSTOLIC BLOOD PRESSURE: 131 MMHG

## 2025-08-04 DIAGNOSIS — M25.561 CHRONIC PAIN OF BOTH KNEES: ICD-10-CM

## 2025-08-04 DIAGNOSIS — G89.29 CHRONIC PAIN OF BOTH KNEES: ICD-10-CM

## 2025-08-04 DIAGNOSIS — M17.0 PRIMARY OSTEOARTHRITIS OF BOTH KNEES: Primary | ICD-10-CM

## 2025-08-04 DIAGNOSIS — M25.562 CHRONIC PAIN OF BOTH KNEES: ICD-10-CM

## 2025-08-04 PROCEDURE — 99999 PR PBB SHADOW E&M-EST. PATIENT-LVL III: CPT | Mod: PBBFAC,,, | Performed by: NURSE PRACTITIONER

## 2025-08-04 PROCEDURE — G2211 COMPLEX E/M VISIT ADD ON: HCPCS | Mod: ,,, | Performed by: NURSE PRACTITIONER

## 2025-08-04 PROCEDURE — 99212 OFFICE O/P EST SF 10 MIN: CPT | Mod: S$PBB,,, | Performed by: NURSE PRACTITIONER

## 2025-08-04 PROCEDURE — 99213 OFFICE O/P EST LOW 20 MIN: CPT | Mod: PBBFAC | Performed by: NURSE PRACTITIONER

## 2025-08-29 ENCOUNTER — PATIENT MESSAGE (OUTPATIENT)
Dept: PAIN MEDICINE | Facility: CLINIC | Age: 80
End: 2025-08-29
Payer: MEDICARE